# Patient Record
Sex: MALE | Race: WHITE | NOT HISPANIC OR LATINO | Employment: OTHER | URBAN - METROPOLITAN AREA
[De-identification: names, ages, dates, MRNs, and addresses within clinical notes are randomized per-mention and may not be internally consistent; named-entity substitution may affect disease eponyms.]

---

## 2017-05-15 ENCOUNTER — TRANSFERRED RECORDS (OUTPATIENT)
Dept: HEALTH INFORMATION MANAGEMENT | Facility: CLINIC | Age: 77
End: 2017-05-15

## 2017-06-07 ENCOUNTER — OFFICE VISIT (OUTPATIENT)
Dept: FAMILY MEDICINE | Facility: CLINIC | Age: 77
End: 2017-06-07
Payer: MEDICARE

## 2017-06-07 VITALS
WEIGHT: 235 LBS | SYSTOLIC BLOOD PRESSURE: 122 MMHG | DIASTOLIC BLOOD PRESSURE: 67 MMHG | TEMPERATURE: 97.5 F | OXYGEN SATURATION: 96 % | BODY MASS INDEX: 28.61 KG/M2 | HEART RATE: 64 BPM

## 2017-06-07 DIAGNOSIS — I35.0 AORTIC VALVE STENOSIS, UNSPECIFIED ETIOLOGY: Primary | ICD-10-CM

## 2017-06-07 DIAGNOSIS — E85.4 CARDIAC AMYLOIDOSIS (H): ICD-10-CM

## 2017-06-07 DIAGNOSIS — I43 CARDIAC AMYLOIDOSIS (H): ICD-10-CM

## 2017-06-07 PROCEDURE — 99213 OFFICE O/P EST LOW 20 MIN: CPT | Performed by: FAMILY MEDICINE

## 2017-06-07 RX ORDER — LORATADINE 10 MG/1
10 TABLET ORAL DAILY
COMMUNITY
End: 2018-07-13

## 2017-06-07 ASSESSMENT — PAIN SCALES - GENERAL: PAINLEVEL: EXTREME PAIN (8)

## 2017-06-07 NOTE — NURSING NOTE
"Chief Complaint   Patient presents with     Shortness of Breath       Initial /67 (BP Location: Right arm, Patient Position: Chair, Cuff Size: Adult Large)  Pulse 64  Temp 97.5  F (36.4  C) (Tympanic)  Wt 235 lb (106.6 kg)  SpO2 96%  BMI 28.61 kg/m2 Estimated body mass index is 28.61 kg/(m^2) as calculated from the following:    Height as of 6/3/16: 6' 4\" (1.93 m).    Weight as of this encounter: 235 lb (106.6 kg).  Medication Reconciliation: complete   Meenu Christianson CMA       "

## 2017-06-07 NOTE — MR AVS SNAPSHOT
After Visit Summary   6/7/2017    Jameel Barrett    MRN: [id number]           Patient Information     Date Of Birth          1940        Visit Information        Provider Department      6/7/2017 3:20 PM Slick Mena MD [facility]        Today's Diagnoses     Aortic valve stenosis, unspecified etiology    -  1    Cardiac amyloidosis (H)           Follow-ups after your visit        Additional Services     CARDIOLOGY EVAL ADULT REFERRAL       Your provider has referred you to:  [facility]: [facility] [phone]   [email or web address]    R/O cardiac amyloidosis, aortic stenosis    Please be aware that coverage of these services is subject to the terms and limitations of your health insurance plan.  Call member services at your health plan with any benefit or coverage questions.      Type of Referral:  New Cardiology Consult    Timeframe requested:  Less than 1 week    Please bring the following to your appointment:  >>   Any x-rays, CTs or MRIs which have been performed.  Contact the facility where they were done to arrange for  prior to your scheduled appointment.    >>   List of current medications  >>   This referral request   >>   Any documents/labs given to you for this referral                  Who to contact     If you have questions or need follow up information about today's clinic visit or your schedule please contact [facility] directly at [phone].  Normal or non-critical lab and imaging results will be communicated to you by MyChart, letter or phone within 4 business days after the clinic has received the results. If you do not hear from us within 7 days, please contact the clinic through MyChart or phone. If you have a critical or abnormal lab result, we will notify you by phone as soon as possible.  Submit refill requests through MyChart or call your  pharmacy and they will forward the refill request to us. Please allow 3 business days for your refill to be completed.          Additional Information About Your Visit        [employer] Information     [employer] gives you secure access to your electronic health record. If you see a primary care provider, you can also send messages to your care team and make appointments. If you have questions, please call your primary care clinic.  If you do not have a primary care provider, please call [phone] and they will assist you.        Care EveryWhere ID     This is your Care EveryWhere ID. This could be used by other organizations to access your [place] medical records  [id number]        Your Vitals Were     Pulse Temperature Pulse Oximetry BMI (Body Mass Index)          64 97.5  F (36.4  C) (Tympanic) 96% 28.61 kg/m2         Blood Pressure from Last 3 Encounters:   06/07/17 122/67   08/25/16 114/61   08/16/16 113/65    Weight from Last 3 Encounters:   06/07/17 235 lb (106.6 kg)   06/03/16 225 lb (102.1 kg)   06/03/14 223 lb (101.2 kg)              We Performed the Following     CARDIOLOGY EVAL ADULT REFERRAL        Primary Care Provider Office Phone # Fax #    Slick Mena [phone]       [facility] [address] [facility] [address]        Thank you!     Thank you for choosing [facility]  for your care. Our goal is always to provide you with excellent care. Hearing back from our patients is one way we can continue to improve our services. Please take a few minutes to complete the written survey that you may receive in the mail after your visit with us. Thank you!             Your Updated Medication List - Protect others around you: Learn how to safely use, store and throw away your medicines at www.disposemymeds.org.          This list is accurate as of: 6/7/17  3:56 PM.  Always use your most recent med list.                   Brand Name Dispense  Instructions for use    arginine 500 MG capsule      Take 400 mg by mouth daily       aspirin 81 MG tablet      Take 1 tablet by mouth daily.       atenolol 25 MG tablet    TENORMIN     Take 50 mg by mouth daily       atorvastatin 20 MG tablet    LIPITOR     Take 40 mg by mouth daily Pt cuts pill in half and takes 20 mg       CARTIA  MG 24 hr capsule   Generic drug:  diltiazem      Take 180 mg by mouth daily       loratadine 10 MG tablet    CLARITIN     Take 10 mg by mouth daily       MULTI-VITAMIN PO      Take  by mouth.

## 2017-06-07 NOTE — PROGRESS NOTES
SUBJECTIVE:                                                    Jameel Barrett is a 77 year old male who presents to clinic today for the following health issues:    He is being followed done at the [facility] for biopsy-proven ATTR cardiac amyloidosis, aortic stenosis, coronary artery disease with prior PCI. The patient has continued to notice progression of his exertional dyspnea.  He has a calculated aortic valve area of 1.8 and a mean gradient of 31. He has intermittent predictable angina.    The patient is interested in getting a second opinion. The [facility] offered him no definitive treatment for his symptoms. We will have him see cardiology at [facility].      Pt breathed in chemical fumes 2 days ago, throat became sore and felt weeping fluid going down throat. And is having sob, also a lot of weakness   His symptoms are gradually clearing and he is now back to baseline.        Problem list and histories reviewed & adjusted, as indicated.  Additional history: as documented        Reviewed and updated as needed this visit by clinical staff  Tobacco  Allergies  Med Hx  Surg Hx  Fam Hx  Soc Hx      Reviewed and updated as needed this visit by Provider        Medical, surgical, family, social histories, allergies and meds reviewed and updated.    ROS:  General: No change in weight, sleep or appetite.  Normal energy.  No fever or chills  Resp: dyspnea on exertion  CV: exertional chest pain or pressure  GI: No nausea, vomiting,  heartburn, abdominal pain, diarrhea, constipation or change in bowel habits    Exam:  GENERAL APPEARANCE ADULT: Alert, no acute distress  HENT: Ears and TMs normal, oral mucosa and posterior oropharynx normal  NECK: No adenopathy,masses or thyromegaly  RESP: lungs clear to auscultation   CV: regular rhythm, grade 3/6  systolic murmur.    ASSESSMENT:  (I35.0) Aortic valve stenosis, unspecified etiology  (primary encounter diagnosis)  Comment:   Plan: CARDIOLOGY EVAL ADULT  REFERRAL            (E85.4,  I43) Cardiac amyloidosis (H)  Comment:   Plan: CARDIOLOGY EVAL ADULT REFERRAL              PLAN:  Orders Placed This Encounter     CARDIOLOGY EVAL ADULT REFERRAL     loratadine (CLARITIN) 10 MG tablet   Recheck in clinic as needed.  We are sending for all the records from the [facility].    There are no Patient Instructions on file for this visit.      Slick Mena

## 2017-06-15 ENCOUNTER — OFFICE VISIT (OUTPATIENT)
Dept: CARDIOLOGY | Facility: CLINIC | Age: 77
End: 2017-06-15
Attending: FAMILY MEDICINE
Payer: MEDICARE

## 2017-06-15 VITALS
HEART RATE: 63 BPM | DIASTOLIC BLOOD PRESSURE: 72 MMHG | WEIGHT: 228.6 LBS | OXYGEN SATURATION: 96 % | SYSTOLIC BLOOD PRESSURE: 137 MMHG | BODY MASS INDEX: 27.83 KG/M2

## 2017-06-15 DIAGNOSIS — I35.0 NONRHEUMATIC AORTIC VALVE STENOSIS: Primary | ICD-10-CM

## 2017-06-15 PROCEDURE — 99204 OFFICE O/P NEW MOD 45 MIN: CPT | Performed by: INTERNAL MEDICINE

## 2017-06-15 RX ORDER — TRIAMTERENE/HYDROCHLOROTHIAZID 37.5-25 MG
1 TABLET ORAL
COMMUNITY
Start: 2014-11-05 | End: 2018-05-23

## 2017-06-15 NOTE — LETTER
6/15/2017    Slick Mena MD  [facility]  [address] [place] [address]    RE: Jameel Barrett       Dear Colleague,    I had the pleasure of seeing Jameel Barrett in the [facility].    CARDIOLOGY CONSULT    REASON FOR CONSULT: CAD, AS, cardiac amyloid    PRIMARY CARE PHYSICIAN:  Slick Mena    HISTORY OF PRESENT ILLNESS:  77-year-old male seen for evaluation of coronary artery disease and aortic stenosis.       He underwent stenting of the proximal LAD in 2007 and second LAD stent placed in 2008.   Coronary angiogram December 2010 showed mild disease of left main, proximal to mid LAD stents widely patent, D1 30%, circumflex 20-30%, dominant RCA with 75-80% lesion, no intervention performed.     Exercise nuclear stress test November 2014 showed fair exercise capacity, jaw pain during test, 1.5 mm of downsloping ST segments, primarily fixed defect of moderate intensity of the inferior and inferior lateral wall with minimal reversibility, ejection fraction 44% with inferior hypokinesis.  Angiogram in 2014 which showed no changes from 2010.  Chest pain was thought to may be from small vessel disease.     Echo May 2016 from outside [facility] showed ejection fraction 60%, moderate LVH, normal right ventricle, moderate left atrial enlargement, tricuspid aortic valve with mild insufficiency and moderate aortic stenosis (mean 25mmHg, Vmax 3.1m/s, DI 0.36, AMBER 1.0cm2).     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  This was proven by cardiac biopsy at the [facility].  He also had repeat angiogram in July 2016 showing mild to moderate disease with no severe lesions.  Hemodynamic assessment of the aortic valve showed moderate stenosis with mean gradient 31, valve area 1.87 m .  RH cath showed RA 3mmHg, RV 37/3, PA 31/11, LVEDP 16, CI 3.4. Echo July 2016 at [place] showed EF 61%, no WMA, severe LVH  (1.7cm), normal RV, mild LAE, moderate AS (mean 29mmHg, Vmax 3.3m/s, AMBER 1.4cm2, DI 0.29), mild AI.     At the [facility] in 2016 he was primarily treated for his ongoing angina.  He had been on a higher dose of diltiazem (180mg) which he did not tolerate because of fatigue.  He had been on atenolol and L-arginine.     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  Since last fall and over the winter, he complained of fatigue with exertion.  He also has a chronic exertional neck discomfort that dates back to his original stents.  He had been taking his diltiazem and L-arginine.  Blood pressure tends to run in the 120s with pulse in the 60s.  He denies any palpitations, overt chest pain, lightheadedness, or syncope.  His weight is stable with no lower extremity edema.    About one week ago he stopped his diltiazem and L-arginine.  Since then he has felt better with better energy and is throat tightness has gone away.  He is able to walking 1 mile outside without needing to stop and rest.    PAST MEDICAL HISTORY:  Past Medical History:   Diagnosis Date     Coronary artery disease      Hypertension      Skin cancer        MEDICATIONS:  Current Outpatient Prescriptions   Medication     arginine (L-ARGININE) 500 MG capsule     atenolol (TENORMIN) 25 MG tablet     aspirin 81 MG tablet     loratadine (CLARITIN) 10 MG tablet     atorvastatin (LIPITOR) 20 MG tablet     Multiple Vitamin (MULTI-VITAMIN PO)     No current facility-administered medications for this visit.        ALLERGIES:  No Known Allergies    SOCIAL HISTORY:  I have reviewed this patient's social history and updated it with pertinent information if needed. Jameel Nena  reports that he has never smoked. He has never used smokeless tobacco. He reports that he drinks alcohol.    FAMILY HISTORY:  I have reviewed this patient's family history and updated it with pertinent information if needed.   No family history on file.    REVIEW OF SYSTEMS:  Constitutional:  No weight loss, fever,  chills, weakness or fatigue.  HEENT:  Eyes:  No visual loss, blurred vision, double vision or yellow sclerae. No hearing loss, sneezing, congestion, runny nose or sore throat.  Skin:  No rash or itching.  Cardiovascular: per HPI  Respiratory: per HPI  GI:  No anorexia, nausea, vomiting or diarrhea. No abdominal pain or blood.  :  No dysurea, hematuria  Neurologic:  No headache, dizziness, syncope, paralysis, ataxia, numbness or tingling in the extremities. No change in bowel or bladder control.  Musculoskeletal:  No muscle, back pain, joint pain or stiffness.  Hematologic:  No anemia, bleeding or bruising.  Lymphatics:  No enlarged nodes. No history of splenectomy.  Psychiatric:  No history of depression or anxiety.  Endocrine:  No reports of sweating, cold or heat intolerance. No polyuria or polydipsia.  Allergies:  No history of asthma, hives, eczema or rhinitis.    PHYSICAL EXAM:  /72 (BP Location: Right arm, Patient Position: Chair, Cuff Size: Adult Regular)  Pulse 63  Wt 103.7 kg (228 lb 9.6 oz)  SpO2 96%  BMI 27.83 kg/m2  Constitutional: awake, alert, no distress  Eyes: PERRL, sclera nonicteric  ENT: trachea midline  Respiratory: Lungs clear  Cardiovascular: Regular rate and rhythm, 3/6 mid peaking systolic murmur at the upper sternal border, no diastolic murmur, a few ectopic beats  GI: nondistended, nontender, bowel sounds present  Lymph/Hematologic: no lymphadenopathy  Skin: dry, no rash  Musculoskeletal: good muscle tone, strength 5/5 in upper and lower extremities  Neurologic: no focal deficits  Neuropsychiatric: appropriate affact    ASSESSMENT:  77-year-old male seen for follow-up of coronary disease, aortic stenosis, and cardiac amyloidosis.    As for his coronary disease, he had no obstructive lesions on angiogram at the [facility] in July 2016.  Interestingly, his fatigue and throat tightness resolved in the past week after stopping diltiazem and L-arginine.  He was advised to stay off these  for now.  No indication for stress testing at this point.    He has moderate aortic stenosis and is due for another echo.  He is probably not point where he would have severe aortic stenosis that would cause symptoms.  It was explained that he'll almost certainly need aortic valve replacement at some point.    As for his cardiac amyloidosis, he has the wild type transthyretin variant.  There is no definitive treatment for this.  There reportedly are some drugs use and trials, but none locally including the [facility].  Generally these patients are followed and any symptoms are treated such as angina or heart failure.    RECOMMENDATIONS:  1.  Coronary artery disease  - For now stay off diltiazem and L-arginine, continue other medications  - Could consider calcium channel blocker or beta blocker in the future if symptoms recur    2.  Moderate aortic stenosis  - Repeat echocardiogram, will determine timing of next echo based on aortic stenosis findings, likely in 6 or 12 months    3.  Wild type transthyretin cardiac amyloidosis  - Echo as above  - No symptoms to suggest congestive heart failure or arrhythmia at this point  - Patient may do one follow-up with the [facility] later this summer    Follow-up in 1 year, sooner if needed if he has any symptoms.  Of note he is going to [place] for the winter in late September.    Justice Graff MD  Cardiology - [facility]  Pager:  [phone]  Text Page  Leta 15, 2017    Thank you for allowing me to participate in the care of your patient.    Sincerely,     Justice Graff MD     [facility]

## 2017-06-15 NOTE — MR AVS SNAPSHOT
After Visit Summary   6/15/2017    Jameel Barrett    MRN: [id number]           Patient Information     Date Of Birth          1940        Visit Information        Provider Department      6/15/2017 10:30 AM Justice Graff MD [facility] PHYS[facility] AT [facility]        Today's Diagnoses     Nonrheumatic aortic valve stenosis    -  1       Follow-ups after your visit        Your next 10 appointments already scheduled     Jun 27, 2017  8:45 AM CDT   Ech Complete with HANNAH   [facility] ([facility])    [address] [facility] [address]   [phone]           1.  Please bring or wear a comfortable two-piece outfit. 2.  You may eat, drink and take your normal medicines. 3.  For any questions that cannot be answered, please contact the ordering physician              Future tests that were ordered for you today     Open Future Orders        Priority Expected Expires Ordered    Echocardiogram Routine 6/22/2017 6/15/2018 6/15/2017            Who to contact     If you have questions or need follow up information about today's clinic visit or your schedule please contact [facility] PHYS[facility] AT [facility] directly at [phone].  Normal or non-critical lab and imaging results will be communicated to you by MyChart, letter or phone within 4 business days after the clinic has received the results. If you do not hear from us within 7 days, please contact the clinic through [employer] or phone. If you have a critical or abnormal lab result, we will notify you by phone as soon as possible.  Submit refill requests through [employer] or call your pharmacy and they will forward the refill request to us. Please allow 3 business days for your refill to be completed.          Additional Information About Your Visit        MyChart Information     [employer] gives you secure access to your electronic health record. If you  see a primary care provider, you can also send messages to your care team and make appointments. If you have questions, please call your primary care clinic.  If you do not have a primary care provider, please call [phone] and they will assist you.        Care EveryWhere ID     This is your Care EveryWhere ID. This could be used by other organizations to access your [place] medical records  [id number]        Your Vitals Were     Pulse Pulse Oximetry BMI (Body Mass Index)             63 96% 27.83 kg/m2          Blood Pressure from Last 3 Encounters:   06/15/17 137/72   06/07/17 122/67   08/25/16 114/61    Weight from Last 3 Encounters:   06/15/17 103.7 kg (228 lb 9.6 oz)   06/07/17 106.6 kg (235 lb)   06/03/16 102.1 kg (225 lb)               Primary Care Provider Office Phone # Fax #    Slick Mena [phone]       [facility] [address] [facility] [address]        Thank you!     Thank you for choosing [facility] PHYSICIAN HEART AT [facility]  for your care. Our goal is always to provide you with excellent care. Hearing back from our patients is one way we can continue to improve our services. Please take a few minutes to complete the written survey that you may receive in the mail after your visit with us. Thank you!             Your Updated Medication List - Protect others around you: Learn how to safely use, store and throw away your medicines at www.disposemymeds.org.          This list is accurate as of: 6/15/17 11:47 AM.  Always use your most recent med list.                   Brand Name Dispense Instructions for use    aspirin 81 MG tablet      Take 1 tablet by mouth daily.       atorvastatin 20 MG tablet    LIPITOR     Take 10 mg by mouth daily       loratadine 10 MG tablet    CLARITIN     Take 10 mg by mouth daily       MULTI-VITAMIN PO      Take  by mouth.       triamterene-hydrochlorothiazide 37.5-25 MG per tablet    MAXZIDE-25      Take 1 tablet by mouth

## 2017-06-15 NOTE — PROGRESS NOTES
CARDIOLOGY CONSULT    REASON FOR CONSULT: CAD, AS, cardiac amyloid    PRIMARY CARE PHYSICIAN:  Slick Mena    HISTORY OF PRESENT ILLNESS:  77-year-old male seen for evaluation of coronary artery disease and aortic stenosis.       He underwent stenting of the proximal LAD in 2007 and second LAD stent placed in 2008.   Coronary angiogram December 2010 showed mild disease of left main, proximal to mid LAD stents widely patent, D1 30%, circumflex 20-30%, dominant RCA with 75-80% lesion, no intervention performed.     Exercise nuclear stress test November 2014 showed fair exercise capacity, jaw pain during test, 1.5 mm of downsloping ST segments, primarily fixed defect of moderate intensity of the inferior and inferior lateral wall with minimal reversibility, ejection fraction 44% with inferior hypokinesis.  Angiogram in 2014 which showed no changes from 2010.  Chest pain was thought to may be from small vessel disease.     Echo May 2016 from outside [facility] showed ejection fraction 60%, moderate LVH, normal right ventricle, moderate left atrial enlargement, tricuspid aortic valve with mild insufficiency and moderate aortic stenosis (mean 25mmHg, Vmax 3.1m/s, DI 0.36, AMBER 1.0cm2).     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  This was proven by cardiac biopsy at the [facility].  He also had repeat angiogram in July 2016 showing mild to moderate disease with no severe lesions.  Hemodynamic assessment of the aortic valve showed moderate stenosis with mean gradient 31, valve area 1.87 m .  RH cath showed RA 3mmHg, RV 37/3, PA 31/11, LVEDP 16, CI 3.4. Echo July 2016 at [place] showed EF 61%, no WMA, severe LVH (1.7cm), normal RV, mild LAE, moderate AS (mean 29mmHg, Vmax 3.3m/s, AMBER 1.4cm2, DI 0.29), mild AI.     At the [facility] in 2016 he was primarily treated for his ongoing angina.  He had been on a higher dose of diltiazem (180mg) which he did not  tolerate because of fatigue.  He had been on atenolol and L-arginine.     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  Since last fall and over the winter, he complained of fatigue with exertion.  He also has a chronic exertional neck discomfort that dates back to his original stents.  He had been taking his diltiazem and L-arginine.  Blood pressure tends to run in the 120s with pulse in the 60s.  He denies any palpitations, overt chest pain, lightheadedness, or syncope.  His weight is stable with no lower extremity edema.    About one week ago he stopped his diltiazem and L-arginine.  Since then he has felt better with better energy and is throat tightness has gone away.  He is able to walking 1 mile outside without needing to stop and rest.    PAST MEDICAL HISTORY:  Past Medical History:   Diagnosis Date     Coronary artery disease      Hypertension      Skin cancer        MEDICATIONS:  Current Outpatient Prescriptions   Medication     arginine (L-ARGININE) 500 MG capsule     atenolol (TENORMIN) 25 MG tablet     aspirin 81 MG tablet     loratadine (CLARITIN) 10 MG tablet     atorvastatin (LIPITOR) 20 MG tablet     Multiple Vitamin (MULTI-VITAMIN PO)     No current facility-administered medications for this visit.        ALLERGIES:  No Known Allergies    SOCIAL HISTORY:  I have reviewed this patient's social history and updated it with pertinent information if needed. Jameel Barrett  reports that he has never smoked. He has never used smokeless tobacco. He reports that he drinks alcohol.    FAMILY HISTORY:  I have reviewed this patient's family history and updated it with pertinent information if needed.   No family history on file.    REVIEW OF SYSTEMS:  Constitutional:  No weight loss, fever, chills, weakness or fatigue.  HEENT:  Eyes:  No visual loss, blurred vision, double vision or yellow sclerae. No hearing loss, sneezing, congestion, runny nose or sore throat.  Skin:  No rash or itching.  Cardiovascular: per HPI  Respiratory: per  HPI  GI:  No anorexia, nausea, vomiting or diarrhea. No abdominal pain or blood.  :  No dysurea, hematuria  Neurologic:  No headache, dizziness, syncope, paralysis, ataxia, numbness or tingling in the extremities. No change in bowel or bladder control.  Musculoskeletal:  No muscle, back pain, joint pain or stiffness.  Hematologic:  No anemia, bleeding or bruising.  Lymphatics:  No enlarged nodes. No history of splenectomy.  Psychiatric:  No history of depression or anxiety.  Endocrine:  No reports of sweating, cold or heat intolerance. No polyuria or polydipsia.  Allergies:  No history of asthma, hives, eczema or rhinitis.    PHYSICAL EXAM:  /72 (BP Location: Right arm, Patient Position: Chair, Cuff Size: Adult Regular)  Pulse 63  Wt 103.7 kg (228 lb 9.6 oz)  SpO2 96%  BMI 27.83 kg/m2  Constitutional: awake, alert, no distress  Eyes: PERRL, sclera nonicteric  ENT: trachea midline  Respiratory: Lungs clear  Cardiovascular: Regular rate and rhythm, 3/6 mid peaking systolic murmur at the upper sternal border, no diastolic murmur, a few ectopic beats  GI: nondistended, nontender, bowel sounds present  Lymph/Hematologic: no lymphadenopathy  Skin: dry, no rash  Musculoskeletal: good muscle tone, strength 5/5 in upper and lower extremities  Neurologic: no focal deficits  Neuropsychiatric: appropriate affact    ASSESSMENT:  77-year-old male seen for follow-up of coronary disease, aortic stenosis, and cardiac amyloidosis.    As for his coronary disease, he had no obstructive lesions on angiogram at the [place] in July 2016.  Interestingly, his fatigue and throat tightness resolved in the past week after stopping diltiazem and L-arginine.  He was advised to stay off these for now.  No indication for stress testing at this point.    He has moderate aortic stenosis and is due for another echo.  He is probably not point where he would have severe aortic stenosis that would cause symptoms.  It was explained that he'll  almost certainly need aortic valve replacement at some point.    As for his cardiac amyloidosis, he has the wild type transthyretin variant.  There is no definitive treatment for this.  There reportedly are some drugs use and trials, but none locally including the [facility] [place] [facility].  Generally these patients are followed and any symptoms are treated such as angina or heart failure.    RECOMMENDATIONS:  1.  Coronary artery disease  - For now stay off diltiazem and L-arginine, continue other medications  - Could consider calcium channel blocker or beta blocker in the future if symptoms recur    2.  Moderate aortic stenosis  - Repeat echocardiogram, will determine timing of next echo based on aortic stenosis findings, likely in 6 or 12 months    3.  Wild type transthyretin cardiac amyloidosis  - Echo as above  - No symptoms to suggest congestive heart failure or arrhythmia at this point  - Patient may do one follow-up with the [facility] later this summer    Follow-up in 1 year, sooner if needed if he has any symptoms.  Of note he is going to [place] for the winter in late September.    Justice Graff MD  Cardiology - [facility]  Pager:  [phone]  Text Page  Leta 15, 2017

## 2017-06-27 ENCOUNTER — HOSPITAL ENCOUNTER (OUTPATIENT)
Dept: CARDIOLOGY | Facility: CLINIC | Age: 77
Discharge: HOME | End: 2017-06-27
Attending: INTERNAL MEDICINE | Admitting: INTERNAL MEDICINE
Payer: MEDICARE

## 2017-06-27 DIAGNOSIS — I35.0 AORTIC STENOSIS: Primary | ICD-10-CM

## 2017-06-27 DIAGNOSIS — I35.0 NONRHEUMATIC AORTIC VALVE STENOSIS: ICD-10-CM

## 2017-06-27 DIAGNOSIS — E85.9 AMYLOIDOSIS (H): ICD-10-CM

## 2017-06-27 PROCEDURE — 93306 TTE W/DOPPLER COMPLETE: CPT | Mod: 26 | Performed by: INTERNAL MEDICINE

## 2017-06-27 PROCEDURE — 93306 TTE W/DOPPLER COMPLETE: CPT

## 2017-06-27 NOTE — PROGRESS NOTES
Echo shows normal heart function, the aortic stenosis has progressed slightly, now in the moderate to severe range.  No changes to medications.  No further testing needed at this point.  He should have an echo in about 6 months.  [place] till spring, unless his symptoms worsen.    Justice Graff MD  Cardiology - [facility]  Pager: [phone]  Text Page  June 27, 2017

## 2017-07-14 ENCOUNTER — TRANSFERRED RECORDS (OUTPATIENT)
Dept: HEALTH INFORMATION MANAGEMENT | Facility: CLINIC | Age: 77
End: 2017-07-14

## 2017-08-13 ENCOUNTER — HOSPITAL ENCOUNTER (EMERGENCY)
Facility: CLINIC | Age: 77
Discharge: HOME | End: 2017-08-13
Attending: EMERGENCY MEDICINE | Admitting: EMERGENCY MEDICINE
Payer: MEDICARE

## 2017-08-13 VITALS
SYSTOLIC BLOOD PRESSURE: 137 MMHG | DIASTOLIC BLOOD PRESSURE: 81 MMHG | RESPIRATION RATE: 13 BRPM | OXYGEN SATURATION: 96 % | WEIGHT: 230 LBS | BODY MASS INDEX: 28.01 KG/M2 | HEIGHT: 76 IN

## 2017-08-13 DIAGNOSIS — F41.1 ANXIETY REACTION: ICD-10-CM

## 2017-08-13 LAB
ALBUMIN SERPL-MCNC: 3.4 G/DL (ref 3.4–5)
ALP SERPL-CCNC: 87 U/L (ref 40–150)
ALT SERPL W P-5'-P-CCNC: 30 U/L (ref 0–70)
ANION GAP SERPL CALCULATED.3IONS-SCNC: 6 MMOL/L (ref 3–14)
AST SERPL W P-5'-P-CCNC: 28 U/L (ref 0–45)
BASOPHILS # BLD AUTO: 0 10E9/L (ref 0–0.2)
BASOPHILS NFR BLD AUTO: 0.3 %
BILIRUB SERPL-MCNC: 0.4 MG/DL (ref 0.2–1.3)
BUN SERPL-MCNC: 17 MG/DL (ref 7–30)
CALCIUM SERPL-MCNC: 9.2 MG/DL (ref 8.5–10.1)
CHLORIDE SERPL-SCNC: 107 MMOL/L (ref 94–109)
CO2 SERPL-SCNC: 27 MMOL/L (ref 20–32)
CREAT SERPL-MCNC: 1.09 MG/DL (ref 0.66–1.25)
DIFFERENTIAL METHOD BLD: ABNORMAL
EOSINOPHIL # BLD AUTO: 0.3 10E9/L (ref 0–0.7)
EOSINOPHIL NFR BLD AUTO: 3.9 %
ERYTHROCYTE [DISTWIDTH] IN BLOOD BY AUTOMATED COUNT: 13.2 % (ref 10–15)
GFR SERPL CREATININE-BSD FRML MDRD: 65 ML/MIN/1.7M2
GLUCOSE SERPL-MCNC: 92 MG/DL (ref 70–99)
HCT VFR BLD AUTO: 40.4 % (ref 40–53)
HGB BLD-MCNC: 13.3 G/DL (ref 13.3–17.7)
IMM GRANULOCYTES # BLD: 0 10E9/L (ref 0–0.4)
IMM GRANULOCYTES NFR BLD: 0.4 %
LYMPHOCYTES # BLD AUTO: 1.7 10E9/L (ref 0.8–5.3)
LYMPHOCYTES NFR BLD AUTO: 24.3 %
MCH RBC QN AUTO: 30.3 PG (ref 26.5–33)
MCHC RBC AUTO-ENTMCNC: 32.9 G/DL (ref 31.5–36.5)
MCV RBC AUTO: 92 FL (ref 78–100)
MONOCYTES # BLD AUTO: 0.8 10E9/L (ref 0–1.3)
MONOCYTES NFR BLD AUTO: 11.2 %
NEUTROPHILS # BLD AUTO: 4.3 10E9/L (ref 1.6–8.3)
NEUTROPHILS NFR BLD AUTO: 59.9 %
PLATELET # BLD AUTO: 212 10E9/L (ref 150–450)
POTASSIUM SERPL-SCNC: 3.6 MMOL/L (ref 3.4–5.3)
PROT SERPL-MCNC: 6.7 G/DL (ref 6.8–8.8)
RBC # BLD AUTO: 4.39 10E12/L (ref 4.4–5.9)
SODIUM SERPL-SCNC: 140 MMOL/L (ref 133–144)
TROPONIN I SERPL-MCNC: 0.05 UG/L (ref 0–0.04)
TROPONIN I SERPL-MCNC: 0.05 UG/L (ref 0–0.04)
WBC # BLD AUTO: 7.1 10E9/L (ref 4–11)

## 2017-08-13 PROCEDURE — 93005 ELECTROCARDIOGRAM TRACING: CPT | Mod: 76

## 2017-08-13 PROCEDURE — 99285 EMERGENCY DEPT VISIT HI MDM: CPT | Mod: 25

## 2017-08-13 PROCEDURE — 84484 ASSAY OF TROPONIN QUANT: CPT | Performed by: EMERGENCY MEDICINE

## 2017-08-13 PROCEDURE — 93010 ELECTROCARDIOGRAM REPORT: CPT | Performed by: EMERGENCY MEDICINE

## 2017-08-13 PROCEDURE — 99285 EMERGENCY DEPT VISIT HI MDM: CPT | Mod: 25 | Performed by: EMERGENCY MEDICINE

## 2017-08-13 PROCEDURE — 80053 COMPREHEN METABOLIC PANEL: CPT | Performed by: EMERGENCY MEDICINE

## 2017-08-13 PROCEDURE — 25000128 H RX IP 250 OP 636: Performed by: EMERGENCY MEDICINE

## 2017-08-13 PROCEDURE — 85025 COMPLETE CBC W/AUTO DIFF WBC: CPT | Performed by: EMERGENCY MEDICINE

## 2017-08-13 PROCEDURE — 93005 ELECTROCARDIOGRAM TRACING: CPT

## 2017-08-13 PROCEDURE — 96374 THER/PROPH/DIAG INJ IV PUSH: CPT

## 2017-08-13 RX ORDER — LORAZEPAM 0.5 MG/1
0.5 TABLET ORAL EVERY 8 HOURS PRN
Qty: 10 TABLET | Refills: 0 | Status: SHIPPED | OUTPATIENT
Start: 2017-08-13 | End: 2017-08-22

## 2017-08-13 RX ORDER — LIDOCAINE 40 MG/G
CREAM TOPICAL
Status: DISCONTINUED | OUTPATIENT
Start: 2017-08-13 | End: 2017-08-13 | Stop reason: HOSPADM

## 2017-08-13 RX ORDER — LORAZEPAM 2 MG/ML
0.5 INJECTION INTRAMUSCULAR ONCE
Status: COMPLETED | OUTPATIENT
Start: 2017-08-13 | End: 2017-08-13

## 2017-08-13 RX ADMIN — LORAZEPAM 0.5 MG: 2 INJECTION INTRAMUSCULAR; INTRAVENOUS at 01:01

## 2017-08-13 ASSESSMENT — ENCOUNTER SYMPTOMS
HEADACHES: 0
WEAKNESS: 0
FATIGUE: 0
ABDOMINAL PAIN: 0
NUMBNESS: 0
VOMITING: 0
CHEST TIGHTNESS: 0
FEVER: 0
DIAPHORESIS: 0
SHORTNESS OF BREATH: 0
PALPITATIONS: 0
BACK PAIN: 0
LIGHT-HEADEDNESS: 1
NAUSEA: 0
CONFUSION: 0
COUGH: 0
APPETITE CHANGE: 0

## 2017-08-13 NOTE — ED NOTES
Pt smiling and reports relief of anxiety and  pressure/pain sensations  Is resting comfortably conversing with his wife

## 2017-08-13 NOTE — ED AVS SNAPSHOT
[facility] Emergency Department    [address] [address]    Phone:  [phone]    Fax:  [phone]                                       Jameel Barrett   MRN: [id number]    Department:  [facility] Emergency Department   Date of Visit:  8/13/2017           After Visit Summary Signature Page     I have received my discharge instructions, and my questions have been answered. I have discussed any challenges I see with this plan with the nurse or doctor.    ..........................................................................................................................................  Patient/Patient Representative Signature      ..........................................................................................................................................  Patient Representative Print Name and Relationship to Patient    ..................................................               ................................................  Date                                            Time    ..........................................................................................................................................  Reviewed by Signature/Title    ...................................................              ..............................................  Date                                                            Time

## 2017-08-13 NOTE — DISCHARGE INSTRUCTIONS

## 2017-08-13 NOTE — ED PROVIDER NOTES
History     Chief Complaint   Patient presents with     Chest Pain     HPI  Jameel Barrett is a 77 year old male with history of hypertension, hyperlipidemia, coronary artery disease status post stents presents for evaluation of chest pain tonight.  Patient reports chronic intermittent chest discomfort, often with exertion.  No associated dyspnea, diaphoresis, or nausea.  Patient reports he was trying to sleep tonight when he became anxious and worried about his chronic heart condition and wondering whether he would die in his sleep.  Patient reports he did not have any significant new symptoms tonight although was having his usual mild left-sided chest discomfort which he gets from time to time.  In the emergency department he continues to have some mild left-sided chest discomfort without trouble breathing, nausea, or diaphoresis.  Patient admits to feeling anxious about his chronic conditions which he feels he has no control over.  Patient admits long-standing untreated anxiety regarding his medical conditions and reports having difficulty sleeping with frequent nighttime awakenings secondary to this.  Patient states he is able to fall back asleep eventually but is not as restful and often is tired during the day requiring multiple.  Denies any history of snoring or known sleep apnea.    I have reviewed the Medications, Allergies, Past Medical and Surgical History, and Social History in the Epic system.      Recent cardiology visit on 6/15/17  ASSESSMENT:  77-year-old male seen for follow-up of coronary disease, aortic stenosis, and cardiac amyloidosis.     As for his coronary disease, he had no obstructive lesions on angiogram at the [place] in July 2016.  Interestingly, his fatigue and throat tightness resolved in the past week after stopping diltiazem and L-arginine.  He was advised to stay off these for now.  No indication for stress testing at this point.     He has moderate aortic stenosis and is due for  another echo.  He is probably not point where he would have severe aortic stenosis that would cause symptoms.  It was explained that he'll almost certainly need aortic valve replacement at some point.     As for his cardiac amyloidosis, he has the wild type transthyretin variant.  There is no definitive treatment for this.  There reportedly are some drugs use and trials, but none locally including the [facility].  Generally these patients are followed and any symptoms are treated such as angina or heart failure.     RECOMMENDATIONS:  1.  Coronary artery disease  - For now stay off diltiazem and L-arginine, continue other medications  - Could consider calcium channel blocker or beta blocker in the future if symptoms recur     2.  Moderate aortic stenosis  - Repeat echocardiogram, will determine timing of next echo based on aortic stenosis findings, likely in 6 or 12 months     3.  Wild type transthyretin cardiac amyloidosis  - Echo as above  - No symptoms to suggest congestive heart failure or arrhythmia at this point  - Patient may do one follow-up with the [facility] later this summer     Follow-up in 1 year, sooner if needed if he has any symptoms.  Of note he is going to [place] for the winter in late September.     Justice Graff MD  Cardiology - [facility]  Pager:  [phone]  Text Page  Leta 15, 2017    Allergies: No Known Allergies      No current facility-administered medications on file prior to encounter.   Current Outpatient Prescriptions on File Prior to Encounter:  triamterene-hydrochlorothiazide (MAXZIDE-25) 37.5-25 MG per tablet Take 1 tablet by mouth   loratadine (CLARITIN) 10 MG tablet Take 10 mg by mouth daily   atorvastatin (LIPITOR) 20 MG tablet Take 10 mg by mouth daily    aspirin 81 MG tablet Take 1 tablet by mouth daily.   Multiple Vitamin (MULTI-VITAMIN PO) Take  by mouth.       Patient Active Problem List   Diagnosis     Hyperlipidemia LDL goal <100     Neck pain     Headache      "Ptosis-episodic     Diplopia     CAD (coronary artery disease)       Past Surgical History:   Procedure Laterality Date     CARDIAC SURGERY  4/8/08      CARDIAC SURGERY  4/12/07     ORTHOPEDIC SURGERY  10/08    LT hip replacment       Social History   Substance Use Topics     Smoking status: Never Smoker     Smokeless tobacco: Never Used     Alcohol use Yes      Comment: 1 drink daily         There is no immunization history on file for this patient.    BMI: Estimated body mass index is 28 kg/(m^2) as calculated from the following:    Height as of this encounter: 1.93 m (6' 4\").    Weight as of this encounter: 104.3 kg (230 lb).      Review of Systems   Constitutional: Negative for appetite change, diaphoresis, fatigue and fever.   HENT: Negative for congestion.    Respiratory: Negative for cough, chest tightness and shortness of breath.    Cardiovascular: Positive for chest pain (Left sided). Negative for palpitations and leg swelling.   Gastrointestinal: Negative for abdominal pain, nausea and vomiting.   Genitourinary: Negative for decreased urine volume.   Musculoskeletal: Negative for back pain.   Skin: Negative for rash.   Neurological: Positive for light-headedness (with rapid change in head position). Negative for weakness, numbness and headaches.   Psychiatric/Behavioral: Negative for confusion.   All other systems reviewed and are negative.      Physical Exam      Physical Exam   Constitutional: He is oriented to person, place, and time. He appears well-developed and well-nourished. No distress.   HENT:   Head: Normocephalic and atraumatic.   Eyes: Conjunctivae are normal.   Cardiovascular: Normal rate and regular rhythm.    Murmur heard.  Pulmonary/Chest: Effort normal and breath sounds normal.   Abdominal: Soft. There is no tenderness.   Musculoskeletal: Normal range of motion. He exhibits no edema.   Neurological: He is alert and oriented to person, place, and time.   Skin: Skin is warm and dry. He is " not diaphoretic.   Psychiatric: He has a normal mood and affect.   Nursing note and vitals reviewed.      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  Unchanged from previous.        Labs Ordered and Resulted from Time of ED Arrival Up to the Time of Departure from the ED   CBC WITH PLATELETS DIFFERENTIAL - Abnormal; Notable for the following:        Result Value    RBC Count 4.39 (*)     All other components within normal limits   COMPREHENSIVE METABOLIC PANEL - Abnormal; Notable for the following:     Protein Total 6.7 (*)     All other components within normal limits   TROPONIN I - Abnormal; Notable for the following:     Troponin I ES 0.050 (*)     All other components within normal limits   TROPONIN I - Abnormal; Notable for the following:     Troponin I ES 0.048 (*)     All other components within normal limits    PERIPHERAL IV CATHETER       1:45 AM: Patient reassessed.  All chest discomfort has resolved.  Patient states he feels much better after medication.  Denies any trouble breathing.  Discussed mildly elevated troponin.  Likely chronic but patient does get intermittent anginal chest pains so we will repeat a 2 hour level.  We will also repeat EKG.    2:56 AM: PT re-assessed. Repeat trop unchanged. Remains asymptomatic.     Assessments & Plan (with Medical Decision Making)  77-year-old male with history of hypertension, coronary disease, amyloidosis, and hyponatremia presents for evaluation of anxiety and chest discomfort.  Patient reports he's had significant anxiety over the past several weeks to months associated with his cardiac diagnoses.  Does report intermittent episodes of left-sided chest discomfort without dyspnea, diaphoresis, or nausea.  Tonight, patient was getting ready for bed and could not sleep due to anxiety prompting evaluation tonight.  Patient states he kept thinking that he might not wake up when he fell asleep and he does not want to die.  Patient reports he had no new or acute symptoms tonight but more of his ongoing chronic symptoms which were worrying him.  In the ED, patient does appear anxious.  We discussed his current symptoms and proceeded with cardiac screening but also symptomatic treatment of anxiety with lorazepam.  Patient's symptoms rapidly improved with lorazepam and did not return.  Screening EKG was not significantly changed from previous.  Troponin was borderline elevated at 0.05.  2nd level drawn 2 hours later was essentially unchanged at 0.048.  Patient has been followed closely with cardiology, last visit about 2 months ago when he had a repeat echo showing slightly worsening aortic stenosis but normal ejection fraction.  Cardiology did not recommend additional stress testing at that time.  Given his absence of symptoms and resolution of his anxiety with a stable elevated  troponin which is likely secondary to his amyloidosis, recommended close primary care follow-up in a trial when necessary lorazepam for anxiety.  Patient advised that if symptoms do return or worsen, he should return to the emergency department for repeat evaluation given his chronic underlying cardiac disease.       I have reviewed the nursing notes.    I have reviewed the findings, diagnosis, plan and need for follow up with the patient.       New Prescriptions    LORAZEPAM (ATIVAN) 0.5 MG TABLET    Take 1 tablet (0.5 mg) by mouth every 8 hours as needed for anxiety       Final diagnoses:   Anxiety reaction       8/13/2017   [facility] EMERGENCY DEPARTMENT     Velazquez, Juan Carlos Ruby MD  08/13/17 8118

## 2017-08-13 NOTE — ED AVS SNAPSHOT
[facility] Emergency Department    [address]    [facility] [address]    Phone:  [phone]    Fax:  [phone]                                       Jameel Barrett   MRN: [id number]    Department:  [facility] Emergency Department   Date of Visit:  8/13/2017           Patient Information     Date Of Birth          1940        Your diagnoses for this visit were:     Anxiety reaction        You were seen by Juan Carlos Velazquez MD.      Follow-up Information     Follow up with Slick Mena MD. Schedule an appointment as soon as possible for a visit in 2 days.    Specialties:  Family Practice, Occupational Medicine    Why:  For follow up    Contact information:    [address]  [facility] [address]  [phone]          Discharge Instructions         Anxiety Reaction  Anxiety is the feeling we all get when we think something bad might happen. It is a normal response to stress and usually causes only a mild reaction. When anxiety becomes more severe, it can interfere with daily life. In some cases, you may not even be aware of what it is you re anxious about. There may also be a genetic link or it may be a learned behavior in the home.  Both psychological and physical triggers cause stress reaction. It's often a response to fear or emotional stress, real or imagined. This stress may come from home, family, work, or social relationships.  During an anxiety reaction, you may feel:    Helpless    Nervous    Depressed    Irritable  Your body may show signs of anxiety in many ways. You may experience:    Dry mouth    Shakiness    Dizziness    Weakness    Trouble breathing    Breathing fast (hyperventilating)    Chest pressure    Sweating    Headache    Nausea    Diarrhea    Tiredness    Inability to sleep    Sexual problems  Home care    Try to locate the sources of stress in your life. They may not be obvious. These may include:    Daily hassles of life (traffic jams, missed  appointments, car troubles, etc.)    Major life changes, both good (new baby, job promotion) and bad (loss of job, loss of loved one)    Overload: feeling that you have too many responsibilities and can't take care of all of them at once    Feeling helpless, feeling that your problems are beyond what you re able to solve    Notice how your body reacts to stress. Learn to listen to your body signals. This will help you take action before the stress becomes severe.    When you can, do something about the source of your stress. (Avoid hassles, limit the amount of change that happens in your life at one time and take a break when you feel overloaded).    Unfortunately, many stressful situations can't be avoided. It is necessary to learn how to better manage stress. There are many proven methods that will reduce your anxiety. These include simple things like exercise, good nutrition and adequate rest. Also, there are certain techniques that are helpful:    Relaxation    Breathing exercises    Visualization    Biofeedback    Meditation  For more information about this, consult your doctor or go to a local bookstore and review the many books and tapes available on this subject.  Follow-up care  If you feel that your anxiety is not responding to self-help measures, contact your doctor or make an appointment with a counselor. You may need short-term psychological counseling and temporary medicine to help you manage stress.  Call 911  Call your healthcare provider right away if any of these occur:    Trouble breathing    Confusion    Drowsiness or trouble wakening    Fainting or loss of consciousness    Rapid heart rate    Seizure    New chest pain that becomes more severe, lasts longer, or spreads into your shoulder, arm, neck, jaw, or back  When to seek medical advice  Call your healthcare provider right away if any of these occur:    Your symptoms get worse    Severe headache not relieved by rest and mild pain  reliever  Date Last Reviewed: 9/29/2015 2000-2017 [employer] [address] [place] [address], [place] [address]. All rights reserved. This information is not intended as a substitute for professional medical care. Always follow your healthcare professional's instructions.          24 Hour Appointment Hotline       To make an appointment at any [facility], call [phone] ([phone]). If you don't have a family doctor or clinic, we will help you find one. [facility] [place] [facility] (formerly [facility]) are conveniently located to serve the needs of you and your family.             Review of your medicines      START taking        Dose / Directions Last dose taken    LORazepam 0.5 MG tablet   Commonly known as:  ATIVAN   Dose:  0.5 mg   Quantity:  10 tablet        Take 1 tablet (0.5 mg) by mouth every 8 hours as needed for anxiety   Refills:  0          Our records show that you are taking the medicines listed below. If these are incorrect, please call your family doctor or clinic.        Dose / Directions Last dose taken    aspirin 81 MG tablet   Dose:  1 tablet        Take 1 tablet by mouth daily.   Refills:  0        atorvastatin 20 MG tablet   Commonly known as:  LIPITOR   Dose:  10 mg        Take 10 mg by mouth daily   Refills:  0        loratadine 10 MG tablet   Commonly known as:  CLARITIN   Dose:  10 mg        Take 10 mg by mouth daily   Refills:  0        MULTI-VITAMIN PO        Take  by mouth.   Refills:  0        triamterene-hydrochlorothiazide 37.5-25 MG per tablet   Commonly known as:  MAXZIDE-25   Dose:  1 tablet        Take 1 tablet by mouth   Refills:  0                Prescriptions were sent or printed at these locations (1 Prescription)                   Other Prescriptions                Printed at Department/Unit printer (1 of 1)         LORazepam (ATIVAN) 0.5 MG tablet                Procedures and tests performed during your visit     Procedure/Test Number of Times Performed    CBC with platelets  differential 1    Comprehensive metabolic panel 1    EKG 12-lead, tracing only 2    Peripheral IV catheter 1    Troponin I 1    Troponin I (second draw) 1      Orders Needing Specimen Collection     None      Pending Results     No orders found from 8/11/2017 to 8/14/2017.            Pending Culture Results     No orders found from 8/11/2017 to 8/14/2017.            Pending Results Instructions     If you had any lab results that were not finalized at the time of your Discharge, you can call the ED Lab Result RN at [phone]. You will be contacted by this team for any positive Lab results or changes in treatment. The nurses are available 7 days a week from 10A to 6:30P.  You can leave a message 24 hours per day and they will return your call.        Test Results From Your Hospital Stay        8/13/2017 12:26 AM      Component Results     Component Value Ref Range & Units Status    WBC 7.1 4.0 - 11.0 10e9/L Final    RBC Count 4.39 (L) 4.4 - 5.9 10e12/L Final    Hemoglobin 13.3 13.3 - 17.7 g/dL Final    Hematocrit 40.4 40.0 - 53.0 % Final    MCV 92 78 - 100 fl Final    MCH 30.3 26.5 - 33.0 pg Final    MCHC 32.9 31.5 - 36.5 g/dL Final    RDW 13.2 10.0 - 15.0 % Final    Platelet Count 212 150 - 450 10e9/L Final    Diff Method Automated Method  Final    % Neutrophils 59.9 % Final    % Lymphocytes 24.3 % Final    % Monocytes 11.2 % Final    % Eosinophils 3.9 % Final    % Basophils 0.3 % Final    % Immature Granulocytes 0.4 % Final    Absolute Neutrophil 4.3 1.6 - 8.3 10e9/L Final    Absolute Lymphocytes 1.7 0.8 - 5.3 10e9/L Final    Absolute Monocytes 0.8 0.0 - 1.3 10e9/L Final    Absolute Eosinophils 0.3 0.0 - 0.7 10e9/L Final    Absolute Basophils 0.0 0.0 - 0.2 10e9/L Final    Abs Immature Granulocytes 0.0 0 - 0.4 10e9/L Final         8/13/2017 12:45 AM      Component Results     Component Value Ref Range & Units Status    Sodium 140 133 - 144 mmol/L Final    Potassium 3.6 3.4 - 5.3 mmol/L Final    Chloride 107 94 -  109 mmol/L Final    Carbon Dioxide 27 20 - 32 mmol/L Final    Anion Gap 6 3 - 14 mmol/L Final    Glucose 92 70 - 99 mg/dL Final    Urea Nitrogen 17 7 - 30 mg/dL Final    Creatinine 1.09 0.66 - 1.25 mg/dL Final    GFR Estimate 65 >60 mL/min/1.7m2 Final    Non  GFR Calc    GFR Estimate If Black 79 >60 mL/min/1.7m2 Final    African American GFR Calc    Calcium 9.2 8.5 - 10.1 mg/dL Final    Bilirubin Total 0.4 0.2 - 1.3 mg/dL Final    Albumin 3.4 3.4 - 5.0 g/dL Final    Protein Total 6.7 (L) 6.8 - 8.8 g/dL Final    Alkaline Phosphatase 87 40 - 150 U/L Final    ALT 30 0 - 70 U/L Final    AST 28 0 - 45 U/L Final         8/13/2017 12:45 AM      Component Results     Component Value Ref Range & Units Status    Troponin I ES 0.050 (H) 0.000 - 0.045 ug/L Final    The 99th percentile for upper reference range is 0.045 ug/L.  Troponin values   in   the range of 0.045 - 0.120 ug/L may be associated with risks of adverse   clinical events.           8/13/2017  2:48 AM      Component Results     Component Value Ref Range & Units Status    Troponin I ES 0.048 (H) 0.000 - 0.045 ug/L Final    The 99th percentile for upper reference range is 0.045 ug/L.  Troponin values   in   the range of 0.045 - 0.120 ug/L may be associated with risks of adverse   clinical events.                  Thank you for choosing [facility]       Thank you for choosing [facility] for your care. Our goal is always to provide you with excellent care. Hearing back from our patients is one way we can continue to improve our services. Please take a few minutes to complete the written survey that you may receive in the mail after you visit with us. Thank you!        [employer] Information     [employer] gives you secure access to your electronic health record. If you see a primary care provider, you can also send messages to your care team and make appointments. If you have questions, please call your primary care clinic.  If you do not have a primary care  provider, please call [phone] and they will assist you.        Care EveryWhere ID     This is your Care EveryWhere ID. This could be used by other organizations to access your [facility] medical records  [id number]        Equal Access to Services     KIERAN DERAS : Marcell Lezama, roselia smith, qanydia kaaladriel talley, sheldon mauro. So [facility] [phone].    ATENCIÓN: Si habla español, tiene a brown disposición servicios gratuitos de asistencia lingüística. Llame al [phone].    We comply with applicable federal civil rights laws and [place] laws. We do not discriminate on the basis of race, color, national origin, age, disability sex, sexual orientation or gender identity.            After Visit Summary       This is your record. Keep this with you and show to your community pharmacist(s) and doctor(s) at your next visit.

## 2017-08-13 NOTE — ED NOTES
"Ongoing concerns with cardiac condition has intermittent chest pain and palpitations today has been more prevalent and he has felt more tired  Also noted increased exertional SOB    Pt reports medications changes about 2 weeks ago     Has had follow up at [facility], with his PCP and has seen cardiology here for \"a second opinion\"       Pt came in tonight as he has been increasingly concerned about his symptoms feels anxious and does multiple BP checks at home and decided   \"i just need to know the heart is going to keep beating\"    Reports he took 1/3 of a viagra tablet 3 days ago    No NTG    Denies nausea does have ongoing occasional dizziness frequently with position changes      "

## 2017-08-22 ENCOUNTER — RADIANT APPOINTMENT (OUTPATIENT)
Dept: GENERAL RADIOLOGY | Facility: CLINIC | Age: 77
End: 2017-08-22
Attending: FAMILY MEDICINE
Payer: MEDICARE

## 2017-08-22 ENCOUNTER — OFFICE VISIT (OUTPATIENT)
Dept: FAMILY MEDICINE | Facility: CLINIC | Age: 77
End: 2017-08-22
Payer: MEDICARE

## 2017-08-22 VITALS
HEART RATE: 71 BPM | TEMPERATURE: 96.6 F | SYSTOLIC BLOOD PRESSURE: 122 MMHG | BODY MASS INDEX: 28.12 KG/M2 | DIASTOLIC BLOOD PRESSURE: 73 MMHG | OXYGEN SATURATION: 97 % | WEIGHT: 231 LBS

## 2017-08-22 DIAGNOSIS — F41.9 ANXIETY: ICD-10-CM

## 2017-08-22 DIAGNOSIS — M54.16 LUMBAR RADICULOPATHY: ICD-10-CM

## 2017-08-22 DIAGNOSIS — M25.551 HIP PAIN, RIGHT: Primary | ICD-10-CM

## 2017-08-22 DIAGNOSIS — M25.551 HIP PAIN, RIGHT: ICD-10-CM

## 2017-08-22 PROCEDURE — 73501 X-RAY EXAM HIP UNI 1 VIEW: CPT

## 2017-08-22 PROCEDURE — 99214 OFFICE O/P EST MOD 30 MIN: CPT | Performed by: FAMILY MEDICINE

## 2017-08-22 RX ORDER — CELECOXIB 100 MG/1
100 CAPSULE ORAL 2 TIMES DAILY PRN
Qty: 60 CAPSULE | Refills: 11 | Status: SHIPPED | OUTPATIENT
Start: 2017-08-22 | End: 2018-05-23

## 2017-08-22 RX ORDER — LORAZEPAM 0.5 MG/1
0.5 TABLET ORAL DAILY PRN
Qty: 30 TABLET | Refills: 0 | Status: SHIPPED | OUTPATIENT
Start: 2017-08-22 | End: 2018-05-23

## 2017-08-22 ASSESSMENT — PAIN SCALES - GENERAL: PAINLEVEL: NO PAIN (0)

## 2017-08-22 NOTE — PROGRESS NOTES
SUBJECTIVE:   Jameel Barrett is a 77 year old male who presents to clinic today for the following health issues:    Right hip pain: He is having significant pain in the right hip which radiates down to the right knee. He is also having numbness on the right thigh. X-ray of the right hip today shows minimal arthritis. He will be going to [place] for the winter. He will start physical therapy for probable right lumbar radiculopathy at the [facility] clinic. He may follow-up with orthopedics regarding the lumbar radiculopathy. He needs an MRI but does not want it scheduled at this time.  Will try Celebrex 100 mg b.i.d. P.r.n.    He was in the emergency room recently for chest pain and got pretty worked up about it. He was given IV lorazepam with good success. He wants to keep a certain amount of these lorazepam available. He only has used a few. I gave him #30.    He needs a refill on Maxide. He is understanding that this was too keep the potassium down because he was on atenolol. I think he is probably on Maxide for hypertension. We will keep him off Maxide since his blood pressures are normal.  Total face to face time: 25 minutes.  Time spent counseling on 15 minutes as outline above.        ED/UC Followup:    Facility:  [facility]  Date of visit: 8/13/17  Reason for visit: Anxiety reaction  Current Status: better     Follow up on referral for heart valve replacement. Pt has 1st opinion from [facility].        Problem list and histories reviewed & adjusted, as indicated.  Additional history: as documented        Reviewed and updated as needed this visit by clinical staff  Tobacco  Allergies  Med Hx  Surg Hx  Fam Hx  Soc Hx      Reviewed and updated as needed this visit by Provider        Medical, surgical, family, social histories, allergies and meds reviewed and updated.    ROS:  General: No change in weight, sleep or appetite.  Normal energy.  No fever or chills  Resp: No coughing, wheezing or  shortness of breath  CV: No chest pains or palpitations  GI: No nausea, vomiting,  heartburn, abdominal pain, diarrhea, constipation or change in bowel habits    Exam:  GENERAL APPEARANCE ADULT: Alert, no acute distress  MS: back exam: normal posture, moves about the exam room comfortably, full ROM    ASSESSMENT:  (M25.551) Hip pain, right  (primary encounter diagnosis)  Comment:   Plan: XR Hip Right 1 View, PHYSICAL THERAPY REFERRAL            (M54.16) Lumbar radiculopathy  Comment:   Plan: celecoxib (CELEBREX) 100 MG capsule, PHYSICAL         THERAPY REFERRAL            (F41.9) Anxiety  Comment:   Plan: LORazepam (ATIVAN) 0.5 MG tablet              PLAN:  Orders Placed This Encounter     XR Hip Right 1 View     PHYSICAL THERAPY REFERRAL     celecoxib (CELEBREX) 100 MG capsule     LORazepam (ATIVAN) 0.5 MG tablet   Recheck in clinic as needed.      There are no Patient Instructions on file for this visit.      Slick Mena

## 2017-08-22 NOTE — MR AVS SNAPSHOT
"              After Visit Summary   8/22/2017    Jameel Barrett    MRN: [id number]           Patient Information     Date Of Birth          1940        Visit Information        Provider Department      8/22/2017 2:20 PM Slick Mena MD [facility]        Today's Diagnoses     Hip pain, right    -  1    Lumbar radiculopathy        Anxiety           Follow-ups after your visit        Additional Services     PHYSICAL THERAPY REFERRAL       *This therapy referral will be filtered to a centralized scheduling office at [facility] and the patient will receive a call to schedule an appointment at a [facility] location most convenient for them. *   Right hip pain r/o radiculopathy    [facility] provides Physical Therapy evaluation and treatment and many specialty services across the [facility] system.  If requesting a specialty program, please choose from the list below.    If you have not heard from the scheduling office within 2 business days, please call [phone] for all locations, with the exception of [place], please call [phone].  Treatment: Evaluation & Treatment  Special Instructions/Modalities: routine  Special Programs:     Please be aware that coverage of these services is subject to the terms and limitations of your health insurance plan.  Call member services at your health plan with any benefit or coverage questions.      **Note to Provider:  If you are referring outside of [facility] for the therapy appointment, please list the name of the location in the \"special instructions\" above, print the referral and give to the patient to schedule the appointment.                  Who to contact     If you have questions or need follow up information about today's clinic visit or your schedule please contact [facility] directly at [phone].  Normal or non-critical lab and imaging results will be communicated to you by " MyChart, letter or phone within 4 business days after the clinic has received the results. If you do not hear from us within 7 days, please contact the clinic through [employer] or phone. If you have a critical or abnormal lab result, we will notify you by phone as soon as possible.  Submit refill requests through [employer] or call your pharmacy and they will forward the refill request to us. Please allow 3 business days for your refill to be completed.          Additional Information About Your Visit        [employer] Information     [employer] gives you secure access to your electronic health record. If you see a primary care provider, you can also send messages to your care team and make appointments. If you have questions, please call your primary care clinic.  If you do not have a primary care provider, please call [phone] and they will assist you.        Care EveryWhere ID     This is your Care EveryWhere ID. This could be used by other organizations to access your [facility] medical records  [id number]        Your Vitals Were     Pulse Temperature Pulse Oximetry BMI (Body Mass Index)          71 96.6  F (35.9  C) (Oral) 97% 28.12 kg/m2         Blood Pressure from Last 3 Encounters:   08/22/17 122/73   08/13/17 137/81   06/15/17 137/72    Weight from Last 3 Encounters:   08/22/17 231 lb (104.8 kg)   08/13/17 230 lb (104.3 kg)   06/15/17 228 lb 9.6 oz (103.7 kg)              We Performed the Following     PHYSICAL THERAPY REFERRAL          Today's Medication Changes          These changes are accurate as of: 8/22/17  3:13 PM.  If you have any questions, ask your nurse or doctor.               Start taking these medicines.        Dose/Directions    celecoxib 100 MG capsule   Commonly known as:  celeBREX   Used for:  Lumbar radiculopathy   Started by:  Slick Mena MD        Dose:  100 mg   Take 1 capsule (100 mg) by mouth 2 times daily as needed for moderate pain   Quantity:  60 capsule   Refills:  11          These medicines have changed or have updated prescriptions.        Dose/Directions    LORazepam 0.5 MG tablet   Commonly known as:  ATIVAN   This may have changed:  when to take this   Used for:  Anxiety   Changed by:  Slick Mena MD        Dose:  0.5 mg   Take 1 tablet (0.5 mg) by mouth daily as needed for anxiety   Quantity:  30 tablet   Refills:  0            Where to get your medicines      These medications were sent to [facility] [address] [place] [address] - [address]   [address] [facility] [address]     Phone:  [phone]     celecoxib 100 MG capsule         Some of these will need a paper prescription and others can be bought over the counter.  Ask your nurse if you have questions.     Bring a paper prescription for each of these medications     LORazepam 0.5 MG tablet                Primary Care Provider Office Phone # Fax #    Slick Mena [phone] [address] [facility] [address]        Equal Access to Services     MARIS DERAS AH: Hadii shilo ku hadasho Soomaali, waaxda luqadaha, qaybta kaalmada adeegyada, waxay idiin hayaan calvin cramer . So [facility] [phone].    ATENCIÓN: Si habla español, tiene a brown disposición servicios gratuitos de asistencia lingüística. Llame al [phone].    We comply with applicable federal civil rights laws and [place] laws. We do not discriminate on the basis of race, color, national origin, age, disability sex, sexual orientation or gender identity.            Thank you!     Thank you for choosing [facility]  for your care. Our goal is always to provide you with excellent care. Hearing back from our patients is one way we can continue to improve our services. Please take a few minutes to complete the written survey that you may receive in the mail after your visit with us. Thank you!             Your Updated Medication List - Protect others around you: Learn how to safely  use, store and throw away your medicines at www.disposemymeds.org.          This list is accurate as of: 8/22/17  3:13 PM.  Always use your most recent med list.                   Brand Name Dispense Instructions for use Diagnosis    aspirin 81 MG tablet      Take 1 tablet by mouth daily.        atorvastatin 20 MG tablet    LIPITOR     Take 10 mg by mouth daily        celecoxib 100 MG capsule    celeBREX    60 capsule    Take 1 capsule (100 mg) by mouth 2 times daily as needed for moderate pain    Lumbar radiculopathy       loratadine 10 MG tablet    CLARITIN     Take 10 mg by mouth daily        LORazepam 0.5 MG tablet    ATIVAN    30 tablet    Take 1 tablet (0.5 mg) by mouth daily as needed for anxiety    Anxiety       MULTI-VITAMIN PO      Take  by mouth.        triamterene-hydrochlorothiazide 37.5-25 MG per tablet    MAXZIDE-25     Take 1 tablet by mouth

## 2017-08-22 NOTE — NURSING NOTE
"Chief Complaint   Patient presents with     RECHECK     from ED 8/13/17 Anxiety reaction     follow up     referral for heart valve       Initial /73 (BP Location: Right arm, Patient Position: Chair, Cuff Size: Adult Large)  Pulse 71  Temp 96.6  F (35.9  C) (Oral)  Wt 231 lb (104.8 kg)  SpO2 97%  BMI 28.12 kg/m2 Estimated body mass index is 28.12 kg/(m^2) as calculated from the following:    Height as of 8/13/17: 6' 4\" (1.93 m).    Weight as of this encounter: 231 lb (104.8 kg).  Medication Reconciliation: complete   Meenu Christianson CMA       "

## 2017-08-29 PROBLEM — F41.9 ANXIETY: Status: ACTIVE | Noted: 2017-08-29

## 2017-08-30 ENCOUNTER — HOSPITAL ENCOUNTER (OUTPATIENT)
Dept: PHYSICAL THERAPY | Facility: CLINIC | Age: 77
Setting detail: THERAPIES SERIES
End: 2017-08-30
Attending: FAMILY MEDICINE
Payer: MEDICARE

## 2017-08-30 PROCEDURE — 97140 MANUAL THERAPY 1/> REGIONS: CPT | Mod: GP | Performed by: PHYSICAL THERAPIST

## 2017-08-30 PROCEDURE — 97161 PT EVAL LOW COMPLEX 20 MIN: CPT | Mod: GP | Performed by: PHYSICAL THERAPIST

## 2017-08-30 PROCEDURE — G8978 MOBILITY CURRENT STATUS: HCPCS | Mod: GP,CJ | Performed by: PHYSICAL THERAPIST

## 2017-08-30 PROCEDURE — 97110 THERAPEUTIC EXERCISES: CPT | Mod: GP | Performed by: PHYSICAL THERAPIST

## 2017-08-30 PROCEDURE — 40000718 ZZHC STATISTIC PT DEPARTMENT ORTHO VISIT: Performed by: PHYSICAL THERAPIST

## 2017-08-30 PROCEDURE — G8979 MOBILITY GOAL STATUS: HCPCS | Mod: GP,CI | Performed by: PHYSICAL THERAPIST

## 2017-08-30 NOTE — PROGRESS NOTES
Jameel Barrett  1940 Physical Therapy Initial Evaluation  08/30/17 1400   General Information   Type of Visit Initial OP Ortho PT Evaluation   Start of Care Date 08/30/17   Referring Physician Slick Mena   Patient/Family Goals Statement Sleep without waking up to back pain   Orders Evaluate and Treat   Date of Order 08/22/17   Insurance Type Medicare;Other  (AARP)   Insurance Comments/Visits Authorized [id number]   Medical Diagnosis Lumbar radiculopathy / Hip pain, right    Surgical/Medical history reviewed Yes   Precautions/Limitations no known precautions/limitations   Body Part(s)   Body Part(s) Lumbar Spine/SI   Presentation and Etiology   Pertinent history of current problem (include personal factors and/or comorbidities that impact the POC) Had a hip replacement on left side and then started having numbness on right leg. Had increased back pain after carrying 2 bags in the airport. Has trouble sleeping. Has been waking up several times throughout the night. Getting up and moving will decrease pain. No pain in back area. / Comorbidities - Coronary artery disease, anxiety     Impairments A. Pain;K. Numbness;M. Locking or catching   Functional Limitations perform activities of daily living;perform desired leisure / sports activities   Symptom Location Right LE in L4-L5 dermatomes   How/Where did it occur From insidious onset   Onset date of current episode/exacerbation 05/01/17   Chronicity Chronic   Pain rating (0-10 point scale) Best (/10);Worst (/10)   Best (/10) 3   Worst (/10) 7   Pain quality A. Sharp;C. Aching;E. Shooting;F. Stabbing   Frequency of pain/symptoms D. Other   Pain frequency comment After lifting weights   Pain/symptoms are: Worse during the night   Pain/symptoms exacerbated by A. Sitting;C. Lifting;D. Carrying   Pain/symptoms eased by B. Walking;C. Rest;F. Certain positions;G. Heat;I. OTC medication(s)   Progression of symptoms since onset: Worsened   Current / Previous Interventions    Diagnostic Tests: X-ray   X-ray Results Results   X-ray results IMPRESSION: Degenerative changes of the right hip.    Prior Level of Function   Functional Level Prior Comment Ind   Current Level of Function   Patient role/employment history F. Retired   Living environment House/townhome   Home/community accessibility 1 flight to basement with rail   Current equipment-Gait/Locomotion None   Fall Risk Screen   Fall screen completed by PT   Per patient - Fall 2 or more times in past year? No   Per patient - Fall with injury in past year? No   Is patient a fall risk? No   Lumbar Spine/SI Objective Findings   Gait/Locomotion Antalgic gait with unloading to uninvolved LE / Minimal lumbar rotation and arm swing   Flexion ROM 18 inches from floor   Extension ROM 15 degrees   Right Side Bending ROM 4 inches from knee   Left Side Bending ROM 4 inches from knee   Repeated Extension-Standing ROM No change in symptoms   Repeated Flexion-Standing ROM Increased pain in shin   Pelvic Screen Negative for SI provocation tests (SI gapping, SI compression, Sacral thrust, Thigh thrust)   Hip Flexion (L2) Strength 4/5 on right and 5/5 on left   Hip Abduction Strength 4-/5 on on right and 4/5 on left   Hip Extension Strength 4-/5 on on right and 4/5 on left   Knee Extension (L3) Strength 5/5 B   Ankle Dorsiflexion (L4) Strength 5/5 B   Great Toe Extension (L5) Strength 5/5 B   Hamstring Flexibility Severely limited B   Quadricep Flexibility Moderately restricted B   Piriformis Flexibility Severely limited on right   SLR Negative B   Slump Test Negative B   Segmental Mobility Restricted L3-L5 - no pain reported   Patellar Tendon Reflexes  2+ B   Palpation ASIS on right rotated forward as compared to left   Planned Therapy Interventions   Planned Therapy Interventions joint mobilization;manual therapy;neuromuscular re-education;strengthening;stretching;ROM   Planned Modality Interventions   Planned Modality Interventions Cryotherapy;Hot  packs   Clinical Impression   Criteria for Skilled Therapeutic Interventions Met yes, treatment indicated   PT Diagnosis Right lower extremity pain likely due to radicular symptoms leading to strength and flexibility deficits of right hip and lumbar musculature.   Influenced by the following impairments Pain, flexibility deficits, strength deficits   Functional limitations due to impairments Difficulty sleeping, stair climbing, walking   Clinical Presentation Stable/Uncomplicated   Clinical Presentation Rationale 2 comorbidities impacting PT / 1 body system / Stable   Clinical Decision Making (Complexity) Low complexity   Therapy Frequency 1 time/week   Predicted Duration of Therapy Intervention (days/wks) 8 weeks   Risk & Benefits of therapy have been explained Yes   Patient, Family & other staff in agreement with plan of care Yes   Education Assessment   Preferred Learning Style Listening;Reading;Demonstration;Pictures/video   Barriers to Learning No barriers   ORTHO GOALS   PT Ortho Eval Goals 1;2;3;4   Ortho Goal 1   Goal Identifier HEP   Goal Description Pt will be independent in HEP in order to achieve and maintain long term treatment goals.   Target Date 09/13/17   Ortho Goal 2   Goal Identifier Sleeping   Goal Description Pt will be able to sleep through the night without waking up due to back pain.   Target Date 10/25/17   Ortho Goal 3   Goal Identifier Stairs   Goal Description Pt will be able to ascend and descend 1 flight of stairs with a minimal increase in low back pain 1-2/10.   Target Date 10/25/17   Ortho Goal 4   Goal Identifier Walk 2 miles   Goal Description Pt will be able to walk 2 miles without having to stop due to low back pain.   Target Date 10/25/17   Total Evaluation Time   Total Evaluation Time 20   Therapy Certification   Certification date from 08/30/17   Certification date to 10/25/17   Medical Diagnosis Lumbar radiculopathy / Hip pain, right      Jd Beatty, PT, DPT

## 2017-08-30 NOTE — PROGRESS NOTES
[facility]          OUTPATIENT PHYSICAL THERAPY ORTHOPEDIC EVALUATION  PLAN OF TREATMENT FOR OUTPATIENT REHABILITATION  (COMPLETE FOR INITIAL CLAIMS ONLY)  Patient's Last Name, First Name, M.I.  YOB: 1940  Jameel Barrett       Provider s Name:  [facility]   Medical Record No.  [id number]   Start of Care Date:  08/30/17   Onset Date:  05/01/17   Type:     _X__PT   ___OT   ___SLP Medical Diagnosis:  Lumbar radiculopathy / Hip pain, right      PT Diagnosis:  Right lower extremity pain likely due to radicular symptoms leading to strength and flexibility deficits of right hip and lumbar musculature.   Visits from SOC:  1      _________________________________________________________________________________  Plan of Treatment/Functional Goals:  joint mobilization, manual therapy, neuromuscular re-education, strengthening, stretching, ROM     Cryotherapy, Hot packs     Goals  Goal Identifier: HEP  Goal Description: Pt will be independent in HEP in order to achieve and maintain long term treatment goals.  Target Date: 09/13/17    Goal Identifier: Sleeping  Goal Description: Pt will be able to sleep through the night without waking up due to back pain.  Target Date: 10/25/17    Goal Identifier: Stairs  Goal Description: Pt will be able to ascend and descend 1 flight of stairs with a minimal increase in low back pain 1-2/10.  Target Date: 10/25/17    Goal Identifier: Walk 2 miles  Goal Description: Pt will be able to walk 2 miles without having to stop due to low back pain.  Target Date: 10/25/17                                                Therapy Frequency:  1 time/week  Predicted Duration of Therapy Intervention:  8 weeks    Justice Beatty PT                 I CERTIFY THE NEED FOR THESE SERVICES FURNISHED UNDER        THIS PLAN OF TREATMENT AND WHILE UNDER MY CARE     (Physician co-signature of this document indicates review and certification of the therapy  plan).                         Certification Date From:  08/30/17   Certification Date To:  10/25/17    Referring Provider:  Slick Mena    Initial Assessment        See Epic Evaluation Start of Care Date: 08/30/17

## 2017-09-05 ENCOUNTER — HOSPITAL ENCOUNTER (OUTPATIENT)
Dept: PHYSICAL THERAPY | Facility: CLINIC | Age: 77
Setting detail: THERAPIES SERIES
End: 2017-09-05
Attending: FAMILY MEDICINE
Payer: MEDICARE

## 2017-09-05 PROCEDURE — 40000718 ZZHC STATISTIC PT DEPARTMENT ORTHO VISIT: Performed by: PHYSICAL THERAPIST

## 2017-09-05 PROCEDURE — 97110 THERAPEUTIC EXERCISES: CPT | Mod: GP | Performed by: PHYSICAL THERAPIST

## 2017-09-11 ENCOUNTER — HOSPITAL ENCOUNTER (OUTPATIENT)
Dept: PHYSICAL THERAPY | Facility: CLINIC | Age: 77
Setting detail: THERAPIES SERIES
End: 2017-09-11
Attending: FAMILY MEDICINE
Payer: MEDICARE

## 2017-09-11 PROCEDURE — G8980 MOBILITY D/C STATUS: HCPCS | Mod: GP,CI | Performed by: PHYSICAL THERAPIST

## 2017-09-11 PROCEDURE — 40000718 ZZHC STATISTIC PT DEPARTMENT ORTHO VISIT: Performed by: PHYSICAL THERAPIST

## 2017-09-11 PROCEDURE — 97110 THERAPEUTIC EXERCISES: CPT | Mod: GP | Performed by: PHYSICAL THERAPIST

## 2017-09-11 PROCEDURE — G8979 MOBILITY GOAL STATUS: HCPCS | Mod: GP,CI | Performed by: PHYSICAL THERAPIST

## 2017-09-11 NOTE — PROGRESS NOTES
Outpatient Physical Therapy Discharge Note     Patient: Jameel Barrett  : 1940    Beginning/End Dates of Reporting Period:  2017 to 2017    Referring Provider: Slick Corbin Diagnosis: Lumbar Radiculopathy     Client Self Report: pt states he is doing well, going to [facility] on the  so he needs to d/c. pt states he is feeling better, the stretches are helping.    Goals:  Goal Identifier HEP   Goal Description Pt will be independent in HEP in order to achieve and maintain long term treatment goals.   Target Date 17   Date Met  17   Progress:     Goal Identifier Sleeping   Goal Description Pt will be able to sleep through the night without waking up due to back pain.   Target Date 10/25/17   Date Met  17   Progress:     Goal Identifier Stairs   Goal Description Pt will be able to ascend and descend 1 flight of stairs with a minimal increase in low back pain -2/10.   Target Date 10/25/17   Date Met  17   Progress:     Goal Identifier Walk 2 miles   Goal Description Pt will be able to walk 2 miles without having to stop due to low back pain.   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            Plan:  Discharge from therapy.    Discharge:    Reason for Discharge: Patient has met all goals.    Equipment Issued: therapy ball    Discharge Plan: Patient to continue home program.

## 2018-05-23 ENCOUNTER — TELEPHONE (OUTPATIENT)
Dept: FAMILY MEDICINE | Facility: CLINIC | Age: 78
End: 2018-05-23

## 2018-05-23 ENCOUNTER — OFFICE VISIT (OUTPATIENT)
Dept: FAMILY MEDICINE | Facility: CLINIC | Age: 78
End: 2018-05-23
Payer: MEDICARE

## 2018-05-23 VITALS
HEART RATE: 80 BPM | HEIGHT: 76 IN | WEIGHT: 218 LBS | DIASTOLIC BLOOD PRESSURE: 62 MMHG | SYSTOLIC BLOOD PRESSURE: 108 MMHG | BODY MASS INDEX: 26.55 KG/M2 | TEMPERATURE: 98.4 F

## 2018-05-23 DIAGNOSIS — M16.11 PRIMARY OSTEOARTHRITIS OF RIGHT HIP: Primary | ICD-10-CM

## 2018-05-23 PROCEDURE — 99213 OFFICE O/P EST LOW 20 MIN: CPT | Performed by: FAMILY MEDICINE

## 2018-05-23 RX ORDER — ALBUTEROL SULFATE 90 UG/1
2 AEROSOL, METERED RESPIRATORY (INHALATION) EVERY 4 HOURS PRN
COMMUNITY
End: 2019-07-29

## 2018-05-23 RX ORDER — CARVEDILOL 3.12 MG/1
3.12 TABLET ORAL
COMMUNITY
Start: 2018-05-10 | End: 2018-08-07

## 2018-05-23 RX ORDER — TORSEMIDE 5 MG/1
10 TABLET ORAL
COMMUNITY
Start: 2018-05-10 | End: 2018-08-07 | Stop reason: DRUGHIGH

## 2018-05-23 RX ORDER — SPIRONOLACTONE 25 MG/1
12.5 TABLET ORAL DAILY
COMMUNITY
Start: 2018-05-10 | End: 2020-07-23

## 2018-05-23 NOTE — TELEPHONE ENCOUNTER
Reason for Call:  Right hip pain and falls    Detailed comments: patient is calling and stating he cannot get into Dr. [address] until June 9th, but he has Right hip pain, along with it giving out and then he collapses. Wondering if he should be seen sooner, as he is falling, quite often.    Phone Number Patient can be reached at: Home number on file [phone] (home)    Best Time: any    Can we leave a detailed message on this number? YES     Jenna Khanna  [facility]  [facility]      Call taken on 5/23/2018 at 10:31 AM by Jenna Khanna

## 2018-05-23 NOTE — TELEPHONE ENCOUNTER
Spoke with patient who has history of arthritic pain and right hip pain that is worsening over time and the patient said he caught himself from falling 3 times but states has not once fallen to ground in the past year, says it is constant pain, no injury, able to stand and walk but is painful to walk and stand, not using a cane or walker, says he feels he is losing strength in the right hip and knows he needs to have it looked at for possible repair. Not swollen or red, but the patient says right leg is smaller in size to the left leg. Advised to be seen today, spoke with Dr. Gardner who allows a release of her hold to see patient today at 1:20, patient called and says this time will work and will have his wife drive him and help in car, told the patient we have wheelchairs to use too, patient will use if needed.    GOVIND Perez

## 2018-05-23 NOTE — PROGRESS NOTES
SUBJECTIVE:   Jameel Barrett is a 78 year old male who presents to clinic today for the following health issues:    Joint Pain    Onset: started last October 2017 and has been getting worse.     Description:   Location: right hip  Character: Sharp and Dull ache    Intensity: moderate    Progression of Symptoms: worse    Accompanying Signs & Symptoms:  Other symptoms: radiation of pain to lower leg, he is losing strength in his right leg. Has a hard time sitting for a long period of time and then getting up. He has almost fallen 3 times due to this.     History:   Previous similar pain: no       Precipitating factors:   Trauma or overuse: no     Alleviating factors:  Improved by: nothing    Therapies Tried and outcome: 1000mg Acetaminophen in the last two days     ADDITIONAL HPI: 78 year old male here for above issue.  Right thigh numbness and electric shock pains down right leg.  Pain seems to be localized to right hip.    ROS: 10 point review of systems negative except as per HPI.    PAST MEDICAL HISTORY:  Past Medical History:   Diagnosis Date     Coronary artery disease      Hypertension      Skin cancer         ACTIVE MEDICAL PROBLEMS:  Patient Active Problem List   Diagnosis     Hyperlipidemia LDL goal <100     Neck pain     Headache     Ptosis-episodic     Diplopia     CAD (coronary artery disease)     Anxiety        FAMILY HISTORY:  History reviewed. No pertinent family history.    SOCIAL HISTORY:  Social History     Social History     Marital status:      Spouse name: N/A     Number of children: N/A     Years of education: N/A     Occupational History     Not on file.     Social History Main Topics     Smoking status: Never Smoker     Smokeless tobacco: Never Used     Alcohol use Yes      Comment: 1 drink daily     Drug use: Not on file     Sexual activity: Not Currently     Other Topics Concern     Not on file     Social History Narrative       MEDICATIONS:  Current Outpatient Prescriptions  "  Medication Sig Dispense Refill     albuterol (PROAIR HFA/PROVENTIL HFA/VENTOLIN HFA) 108 (90 Base) MCG/ACT Inhaler Inhale 2 puffs into the lungs       aspirin 81 MG tablet Take 1 tablet by mouth daily.       atorvastatin (LIPITOR) 20 MG tablet Take 10 mg by mouth daily        carvedilol (COREG) 3.125 MG tablet Take 3.125 mg by mouth       loratadine (CLARITIN) 10 MG tablet Take 10 mg by mouth daily       Multiple Vitamin (MULTI-VITAMIN PO) Take  by mouth.       spironolactone (ALDACTONE) 25 MG tablet Take 12.5 mg by mouth       torsemide (DEMADEX) 5 MG tablet Take 10 mg by mouth         ALLERGIES:   No Known Allergies    Problem list, Medication list, Allergies, and Medical/Social/Surgical histories reviewed in [facility] and updated as appropriate.    OBJECTIVE:                                                    VITALS: /62 (BP Location: Right arm, Patient Position: Chair, Cuff Size: Adult Regular)  Pulse 80  Temp 98.4  F (36.9  C) (Tympanic)  Ht 6' 4\" (1.93 m)  Wt 218 lb (98.9 kg)  BMI 26.54 kg/m2 Body mass index is 26.54 kg/(m^2).  GENERAL: Pleasant, well appearing male.  MUSCULOSKELETAL:  Back shows no tenderness to palpation. Straight leg raise is negative for radicular symptoms.  Pain with internal and external right hip rotation.  Significantly limited range of motion with internal and external hip rotation.    RIGHT HIP TWO VIEWS August 22, 2017 2:52 PM     HISTORY: Pain in right hip.     COMPARISON: None.     FINDINGS: No fracture or osseous lesion is seen. There is mild joint  space loss in the right hip with moderate marginal osteophyte  formation. No other abnormality is seen.         IMPRESSION: Degenerative changes of the right hip.     ASSESSMENT/PLAN:                                                    1. Primary osteoarthritis of right hip  History was initially concerning for sciatica symptoms however exam shows significant hip pathology and a relatively benign back/radicular exam.  He " completed a course of physical therapy last fall.  We will have him follow-up with Ortho to discuss management options.  - ORTHO  REFERRAL

## 2018-05-23 NOTE — NURSING NOTE
"Initial /62 (BP Location: Right arm, Patient Position: Chair, Cuff Size: Adult Regular)  Pulse 80  Temp 98.4  F (36.9  C) (Tympanic)  Ht 6' 4\" (1.93 m)  Wt 218 lb (98.9 kg)  BMI 26.54 kg/m2 Estimated body mass index is 26.54 kg/(m^2) as calculated from the following:    Height as of this encounter: 6' 4\" (1.93 m).    Weight as of this encounter: 218 lb (98.9 kg). .    Rupali Ervin CMA ([facility])  "

## 2018-05-23 NOTE — MR AVS SNAPSHOT
After Visit Summary   5/23/2018    Jameel Barrett    MRN: [id number]           Patient Information     Date Of Birth          1940        Visit Information        Provider Department      5/23/2018 1:20 PM Kendra Mena MD [facility]        Today's Diagnoses     Primary osteoarthritis of right hip    -  1       Follow-ups after your visit        Additional Services     ORTHO  REFERRAL       [facility] is referring you to the Orthopedic  Services at [facility].       The  Representative will assist you in the coordination of your Orthopedic and Musculoskeletal Care as prescribed by your physician.    The  Representative will call you within 1 business day to help schedule your appointment, or you may contact the  Representative at:    All areas ~ [phone]     Type of Referral : Surgical / Specialist       Timeframe requested: Routine    Coverage of these services is subject to the terms and limitations of your health insurance plan.  Please call member services at your health plan with any benefit or coverage questions.      If X-rays, CT or MRI's have been performed, please contact the facility where they were done to arrange for , prior to your scheduled appointment.  Please bring this referral request to your appointment and present it to your specialist.                  Your next 10 appointments already scheduled     Jun 08, 2018 12:40 PM CDT   Office Visit with Azeb Street MD   [facility] ([facility])    [address] [facility] [id number] [phone]           Bring a current list of meds and any records pertaining to this visit. For Physicals, please bring immunization records and any forms needing to be filled out. Please arrive 10 minutes early to complete paperwork.              Who to contact     If you have  "questions or need follow up information about today's clinic visit or your schedule please contact [facility] directly at [phone].  Normal or non-critical lab and imaging results will be communicated to you by MyChart, letter or phone within 4 business days after the clinic has received the results. If you do not hear from us within 7 days, please contact the clinic through MyChart or phone. If you have a critical or abnormal lab result, we will notify you by phone as soon as possible.  Submit refill requests through [employer] or call your pharmacy and they will forward the refill request to us. Please allow 3 business days for your refill to be completed.          Additional Information About Your Visit        [employer] Information     [employer] gives you secure access to your electronic health record. If you see a primary care provider, you can also send messages to your care team and make appointments. If you have questions, please call your primary care clinic.  If you do not have a primary care provider, please call [phone] and they will assist you.        Care EveryWhere ID     This is your Care EveryWhere ID. This could be used by other organizations to access your [place] medical records  [id number]        Your Vitals Were     Pulse Temperature Height BMI (Body Mass Index)          80 98.4  F (36.9  C) (Tympanic) 6' 4\" (1.93 m) 26.54 kg/m2         Blood Pressure from Last 3 Encounters:   05/23/18 108/62   08/22/17 122/73   08/13/17 137/81    Weight from Last 3 Encounters:   05/23/18 218 lb (98.9 kg)   08/22/17 231 lb (104.8 kg)   08/13/17 230 lb (104.3 kg)              We Performed the Following     ORTHO  REFERRAL        Primary Care Provider Office Phone # Fax #    Slick Mena [phone]       [address] [facility] [address]        Equal Access to Services     KIERAN DERAS AH: Hadii shilo scotto Sotess, waaxda luqadaha, qaybta " sheldon lujanmaximus cramer ah. [facility] [phone].    ATENCIÓN: Si samina talley, tiene a brown disposición servicios gratuitos de asistencia lingüística. Lenard al [phone].    We comply with applicable federal civil rights laws and [place] laws. We do not discriminate on the basis of race, color, national origin, age, disability, sex, sexual orientation, or gender identity.            Thank you!     Thank you for choosing [facility]  for your care. Our goal is always to provide you with excellent care. Hearing back from our patients is one way we can continue to improve our services. Please take a few minutes to complete the written survey that you may receive in the mail after your visit with us. Thank you!             Your Updated Medication List - Protect others around you: Learn how to safely use, store and throw away your medicines at www.disposemymeds.org.          This list is accurate as of 5/23/18  2:12 PM.  Always use your most recent med list.                   Brand Name Dispense Instructions for use Diagnosis    albuterol 108 (90 Base) MCG/ACT Inhaler    PROAIR HFA/PROVENTIL HFA/VENTOLIN HFA     Inhale 2 puffs into the lungs        aspirin 81 MG tablet      Take 1 tablet by mouth daily.        atorvastatin 20 MG tablet    LIPITOR     Take 10 mg by mouth daily        carvedilol 3.125 MG tablet    COREG     Take 3.125 mg by mouth        loratadine 10 MG tablet    CLARITIN     Take 10 mg by mouth daily        MULTI-VITAMIN PO      Take  by mouth.        spironolactone 25 MG tablet    ALDACTONE     Take 12.5 mg by mouth        torsemide 5 MG tablet    DEMADEX     Take 10 mg by mouth

## 2018-07-13 ENCOUNTER — OFFICE VISIT (OUTPATIENT)
Dept: FAMILY MEDICINE | Facility: CLINIC | Age: 78
End: 2018-07-13
Payer: MEDICARE

## 2018-07-13 VITALS
DIASTOLIC BLOOD PRESSURE: 68 MMHG | BODY MASS INDEX: 26.9 KG/M2 | OXYGEN SATURATION: 98 % | HEART RATE: 77 BPM | RESPIRATION RATE: 16 BRPM | WEIGHT: 221 LBS | SYSTOLIC BLOOD PRESSURE: 112 MMHG | TEMPERATURE: 96.8 F

## 2018-07-13 DIAGNOSIS — M16.11 PRIMARY OSTEOARTHRITIS OF RIGHT HIP: ICD-10-CM

## 2018-07-13 DIAGNOSIS — E78.5 HYPERLIPIDEMIA LDL GOAL <100: ICD-10-CM

## 2018-07-13 DIAGNOSIS — I25.118 CORONARY ARTERY DISEASE OF NATIVE ARTERY OF NATIVE HEART WITH STABLE ANGINA PECTORIS (H): ICD-10-CM

## 2018-07-13 DIAGNOSIS — Z01.818 PREOP GENERAL PHYSICAL EXAM: Primary | ICD-10-CM

## 2018-07-13 DIAGNOSIS — N47.1 PHIMOSIS: ICD-10-CM

## 2018-07-13 DIAGNOSIS — E85.82 WILD-TYPE TRANSTHYRETIN-RELATED (ATTR) AMYLOIDOSIS (H): ICD-10-CM

## 2018-07-13 LAB
ANION GAP SERPL CALCULATED.3IONS-SCNC: 5 MMOL/L (ref 3–14)
BASOPHILS # BLD AUTO: 0 10E9/L (ref 0–0.2)
BASOPHILS NFR BLD AUTO: 0.4 %
BUN SERPL-MCNC: 19 MG/DL (ref 7–30)
CALCIUM SERPL-MCNC: 9.8 MG/DL (ref 8.5–10.1)
CHLORIDE SERPL-SCNC: 106 MMOL/L (ref 94–109)
CO2 SERPL-SCNC: 29 MMOL/L (ref 20–32)
CREAT SERPL-MCNC: 1.09 MG/DL (ref 0.66–1.25)
DIFFERENTIAL METHOD BLD: NORMAL
EOSINOPHIL # BLD AUTO: 0.4 10E9/L (ref 0–0.7)
EOSINOPHIL NFR BLD AUTO: 5 %
ERYTHROCYTE [DISTWIDTH] IN BLOOD BY AUTOMATED COUNT: 14.6 % (ref 10–15)
GFR SERPL CREATININE-BSD FRML MDRD: 65 ML/MIN/1.7M2
GLUCOSE SERPL-MCNC: 121 MG/DL (ref 70–99)
HCT VFR BLD AUTO: 41.7 % (ref 40–53)
HGB BLD-MCNC: 13.7 G/DL (ref 13.3–17.7)
LYMPHOCYTES # BLD AUTO: 1.4 10E9/L (ref 0.8–5.3)
LYMPHOCYTES NFR BLD AUTO: 19 %
MCH RBC QN AUTO: 30 PG (ref 26.5–33)
MCHC RBC AUTO-ENTMCNC: 32.9 G/DL (ref 31.5–36.5)
MCV RBC AUTO: 91 FL (ref 78–100)
MONOCYTES # BLD AUTO: 0.7 10E9/L (ref 0–1.3)
MONOCYTES NFR BLD AUTO: 9.9 %
NEUTROPHILS # BLD AUTO: 4.7 10E9/L (ref 1.6–8.3)
NEUTROPHILS NFR BLD AUTO: 65.7 %
PLATELET # BLD AUTO: 253 10E9/L (ref 150–450)
POTASSIUM SERPL-SCNC: 3.9 MMOL/L (ref 3.4–5.3)
RBC # BLD AUTO: 4.56 10E12/L (ref 4.4–5.9)
SODIUM SERPL-SCNC: 140 MMOL/L (ref 133–144)
WBC # BLD AUTO: 7.1 10E9/L (ref 4–11)

## 2018-07-13 PROCEDURE — 85025 COMPLETE CBC W/AUTO DIFF WBC: CPT | Performed by: FAMILY MEDICINE

## 2018-07-13 PROCEDURE — 99214 OFFICE O/P EST MOD 30 MIN: CPT | Performed by: FAMILY MEDICINE

## 2018-07-13 PROCEDURE — 80048 BASIC METABOLIC PNL TOTAL CA: CPT | Performed by: FAMILY MEDICINE

## 2018-07-13 PROCEDURE — 36415 COLL VENOUS BLD VENIPUNCTURE: CPT | Performed by: FAMILY MEDICINE

## 2018-07-13 RX ORDER — LORATADINE 10 MG/1
10 CAPSULE, LIQUID FILLED ORAL EVERY OTHER DAY
COMMUNITY
Start: 2017-07-14 | End: 2020-07-23

## 2018-07-13 RX ORDER — ACETAMINOPHEN 500 MG
1000 TABLET ORAL 2 TIMES DAILY
COMMUNITY

## 2018-07-13 RX ORDER — FLUTICASONE PROPIONATE 50 MCG
2 SPRAY, SUSPENSION (ML) NASAL DAILY
COMMUNITY
End: 2020-07-23

## 2018-07-13 ASSESSMENT — PAIN SCALES - GENERAL: PAINLEVEL: MODERATE PAIN (4)

## 2018-07-13 NOTE — PROGRESS NOTES
[facility]  [address] [facility] [address]  [phone]  Dept: [phone]    PRE-OP EVALUATION:  Today's date: 2018    Jameel Barrett (: 1940) presents for pre-operative evaluation assessment as requested by Dr. Sanz.  He requires evaluation and anesthesia risk assessment prior to undergoing surgery/procedure for treatment of severe osteoarthritis of right hip.    Fax number for surgical facility: [phone]  Primary Physician: Dr. Cano  Type of Anesthesia Anticipated: Cardiac Special Anesthesia    Patient has a Health Care Directive or Living Will:  YES at [facility] in [place], [place]    Preop Questions 2018   Who is doing your surgery? Dr. Teodoro Sanz   What are you having done? right hip replacement   Date of Surgery/Procedure: 2018   Facility or Hospital where procedure/surgery will be performed: [facility]   1.  Do you have a history of Heart attack, stroke, stent, coronary bypass surgery, or other heart surgery? YES  -see problem list   2.  Do you ever have any pain or discomfort in your chest? No   3.  Do you have a history of  Heart Failure? No   4.   Are you troubled by shortness of breath when:  walking on a level surface, or up a slight hill, or at night? YES -this is stable and he walks every day, only has trouble going up a hill   5.  Do you currently have a cold, bronchitis or other respiratory infection? No   6.  Do you have a cough, shortness of breath, or wheezing? No   7.  Do you sometimes get pains in the calves of your legs when you walk? No   8. Do you or anyone in your family have previous history of blood clots? No   9.  Do you or does anyone in your family have a serious bleeding problem such as prolonged bleeding following surgeries or cuts? No   10. Have you ever had problems with anemia or been told to take iron pills? No   11. Have you had any abnormal blood loss such as black, tarry or bloody stools? No    12. Have you ever had a blood transfusion? UNKNOWN -    13. Have you or any of your relatives ever had problems with anesthesia? No   14. Do you have sleep apnea, excessive snoring or daytime drowsiness? no   15. Do you have any prosthetic heart valves? No   16. Do you have prosthetic joints? YES -left total hip arthroplasty         HPI:     HPI related to upcoming procedure: He has developed pain in the right hip over the last couple years which is getting progressive responding to      See problem list for active medical problems.  Problems all longstanding and stable, except as noted/documented.  See ROS for pertinent symptoms related to these conditions.                                                                                                                                                          .    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 Provider to review       Ptosis-episodic 06/08/2012     Priority: Medium     Diplopia 06/08/2012     Priority: Medium     Hyperlipidemia LDL goal <100 06/07/2012     Priority: Medium      Past Medical History:   Diagnosis Date     Coronary artery disease      Hypertension      Skin cancer      Past Surgical History:   Procedure Laterality Date     CARDIAC SURGERY  4/8/08      CARDIAC SURGERY  4/12/07     ORTHOPEDIC SURGERY  10/08    LT hip replacment     Current Outpatient Prescriptions   Medication Sig Dispense Refill     albuterol (PROAIR HFA/PROVENTIL HFA/VENTOLIN HFA) 108 (90 Base) MCG/ACT Inhaler Inhale 2 puffs into the lungs       aspirin 81 MG tablet  Take 1 tablet by mouth daily.       atorvastatin (LIPITOR) 20 MG tablet Take 10 mg by mouth daily        carvedilol (COREG) 3.125 MG tablet Take 3.125 mg by mouth       loratadine (CLARITIN) 10 MG tablet Take 10 mg by mouth daily       Multiple Vitamin (MULTI-VITAMIN PO) Take  by mouth.       spironolactone (ALDACTONE) 25 MG tablet Take 12.5 mg by mouth       torsemide (DEMADEX) 5 MG tablet Take 10 mg by mouth       OTC products: None, except as noted above    No Known Allergies   Latex Allergy: NO    Social History   Substance Use Topics     Smoking status: Never Smoker     Smokeless tobacco: Never Used     Alcohol use Yes      Comment: 1 drink daily     History   Drug Use Not on file       REVIEW OF SYSTEMS:   Constitutional, HEENT, cardiovascular, pulmonary, gi and gu systems are negative, except as otherwise noted.    EXAM:   /68 (BP Location: Right arm, Patient Position: Chair, Cuff Size: Adult Large)  Pulse 77  Temp 96.8  F (36  C) (Tympanic)  Resp 16  Wt 221 lb (100.2 kg)  SpO2 98%  BMI 26.9 kg/m2    GENERAL APPEARANCE: healthy, alert and no distress     EYES: EOMI,  PERRL     HENT: ear canals and TM's normal and nose and mouth without ulcers or lesions     NECK: no adenopathy, no asymmetry, masses, or scars and thyroid normal to palpation     RESP: lungs clear to auscultation - no rales, rhonchi or wheezes     CV: regular rates and rhythm, normal S1 S2, no S3 or S4 and no murmur, click or rub     ABDOMEN:  soft, nontender, no HSM or masses and bowel sounds normal     MS: extremities normal- no gross deformities noted, no evidence of inflammation in joints, FROM in all extremities.     SKIN: no suspicious lesions or rashes     NEURO: Normal strength and tone, sensory exam grossly normal, mentation intact and speech normal     PSYCH: mentation appears normal. and affect normal/bright     LYMPHATICS: No cervical adenopathy    DIAGNOSTICS:   Complete cardiac  Evaluation done at [facility].  See  attached copy, but he is cleared for surgery by cardiology    Recent Labs   Lab Test  08/13/17   0016 03/17/15   06/29/11   1345   HGB  13.3   --    --   12.5*   PLT  212   --    --   219   NA  140   --    --   143   POTASSIUM  3.6  4.0   --   4.0   CR  1.09  1.14   < >  1.05    < > = values in this interval not displayed.      Labs drawn in process: CBC and BMP; he had a glycosylated hemoglobin performed at [facility] recently and was normal  IMPRESSION:   Reason for surgery/procedure: Severe osteoarthritis right hip  Diagnosis/reason for consult: Evaluate for anesthesia risk    The proposed surgical procedure is considered LOW risk.    REVISED CARDIAC RISK INDEX  The patient has the following serious cardiovascular risks for perioperative complications such as (MI, PE, VFib and 3  AV Block):  Coronary Artery Disease (MI, positive stress test, angina, Qs on EKG)  INTERPRETATION: 2 risks: Class III (moderate risk - 6.6% complication rate)    The patient has the following additional risks for perioperative complications:  No identified additional risks      ICD-10-CM    1. Preop general physical exam Z01.818    2. Primary osteoarthritis of right hip M16.11    3. Coronary artery disease of native artery of native heart with stable angina pectoris (H) I25.118 CBC with platelets differential     Basic metabolic panel   4. Phimosis N47.1 UROLOGY ADULT REFERRAL   5. Hyperlipidemia LDL goal <100 E78.5    6. Wild-type transthyretin-related (ATTR) amyloidosis E85.82        RECOMMENDATIONS:       Cardiovascular Risk  Patient is already on a Beta Blocker. Continue Betablocker therapy after surgery, using Beta blocker order set as necessary for NPO status.      --Patient is to take all scheduled medications on the day of surgery.    APPROVAL GIVEN to proceed with proposed procedure, without further diagnostic evaluation       Signed Electronically by: LEONA Cano MD    Copy of this evaluation report is provided to requesting  physician.    [place] Preop Guidelines    Revised Cardiac Risk Index

## 2018-07-13 NOTE — PROGRESS NOTES
Jameel,  Your labs were in the acceptable range.  A slightly elevated glucose is acceptable because this was a nonfasting specimen.  We faxed these results over with your preop note to [facility].adilene Cano MD

## 2018-07-13 NOTE — MR AVS SNAPSHOT
After Visit Summary   7/13/2018    Jameel Barrett    MRN: [id number]           Patient Information     Date Of Birth          1940        Visit Information        Provider Department      7/13/2018 9:40 AM LEONA Cano MD [facility]        Today's Diagnoses     Preop general physical exam    -  1    Primary osteoarthritis of right hip        Coronary artery disease of native artery of native heart with stable angina pectoris (H)        Phimosis        Hyperlipidemia LDL goal <100        Wild-type transthyretin-related (ATTR) amyloidosis          Care Instructions      Before Your Surgery      Call your surgeon if there is any change in your health. This includes signs of a cold or flu (such as a sore throat, runny nose, cough, rash or fever).    Do not smoke, drink alcohol or take over the counter medicine (unless your surgeon or primary care doctor tells you to) for the 24 hours before and after surgery.    If you take prescribed drugs: Follow your doctor s orders about which medicines to take and which to stop until after surgery.    Eating and drinking prior to surgery: follow the instructions from your surgeon    Take a shower or bath the night before surgery. Use the soap your surgeon gave you to gently clean your skin. If you do not have soap from your surgeon, use your regular soap. Do not shave or scrub the surgery site.  Wear clean pajamas and have clean sheets on your bed.           Follow-ups after your visit        Additional Services     UROLOGY ADULT REFERRAL       Your provider has referred you to: [facility]: [facility] [phone]   [email or web address]    Please be aware that coverage of these services is subject to the terms and limitations of your health insurance plan.  Call member services at your health plan with any benefit or coverage questions.      Please  bring the following with you to your appointment:    (1) Any X-Rays, CTs or MRIs which have been performed.  Contact the facility where they were done to arrange for  prior to your scheduled appointment.    (2) List of current medications  (3) This referral request   (4) Any documents/labs given to you for this referral                  Who to contact     If you have questions or need follow up information about today's clinic visit or your schedule please contact [facility] directly at [phone].  Normal or non-critical lab and imaging results will be communicated to you by NormOxyshart, letter or phone within 4 business days after the clinic has received the results. If you do not hear from us within 7 days, please contact the clinic through Timehopt or phone. If you have a critical or abnormal lab result, we will notify you by phone as soon as possible.  Submit refill requests through BISON or call your pharmacy and they will forward the refill request to us. Please allow 3 business days for your refill to be completed.          Additional Information About Your Visit        NormOxyshart Information     BISON gives you secure access to your electronic health record. If you see a primary care provider, you can also send messages to your care team and make appointments. If you have questions, please call your primary care clinic.  If you do not have a primary care provider, please call [phone] and they will assist you.        Care EveryWhere ID     This is your Care EveryWhere ID. This could be used by other organizations to access your [place] medical records  [id number]        Your Vitals Were     Pulse Temperature Respirations Pulse Oximetry BMI (Body Mass Index)       77 96.8  F (36  C) (Tympanic) 16 98% 26.9 kg/m2        Blood Pressure from Last 3 Encounters:   07/13/18 112/68   05/23/18 108/62   08/22/17 122/73    Weight from Last 3 Encounters:   07/13/18 221 lb (100.2 kg)    05/23/18 218 lb (98.9 kg)   08/22/17 231 lb (104.8 kg)              We Performed the Following     Basic metabolic panel     CBC with platelets differential     UROLOGY ADULT REFERRAL        Primary Care Provider    Slick Mena MD       No address on file        Equal Access to Services     KIERAN DERAS : Hadii shilo ku robert Sotess, waaxda luqadaha, qaybta kaalmada ademadi, sheldon andersonkaty . So [facility] [phone].    ATENCIÓN: Si habla español, tiene a brown disposición servicios gratuitos de asistencia lingüística. Llame al [phone].    We comply with applicable federal civil rights laws and [place] laws. We do not discriminate on the basis of race, color, national origin, age, disability, sex, sexual orientation, or gender identity.            Thank you!     Thank you for choosing [facility]  for your care. Our goal is always to provide you with excellent care. Hearing back from our patients is one way we can continue to improve our services. Please take a few minutes to complete the written survey that you may receive in the mail after your visit with us. Thank you!             Your Updated Medication List - Protect others around you: Learn how to safely use, store and throw away your medicines at www.disposemymeds.org.          This list is accurate as of 7/13/18 10:41 AM.  Always use your most recent med list.                   Brand Name Dispense Instructions for use Diagnosis    acetaminophen 500 MG tablet    TYLENOL     Take 500 mg by mouth as needed        albuterol 108 (90 Base) MCG/ACT Inhaler    PROAIR HFA/PROVENTIL HFA/VENTOLIN HFA     Inhale 2 puffs into the lungs        aspirin 81 MG tablet      Take 1 tablet by mouth daily.        atorvastatin 20 MG tablet    LIPITOR     Take 10 mg by mouth daily        carvedilol 3.125 MG tablet    COREG     Take 3.125 mg by mouth        CLARITIN 10 MG capsule   Generic drug:  loratadine      Take 10 mg  by mouth twice a week        fluticasone 50 MCG/ACT spray    FLONASE     Spray 2 sprays in nostril daily        MULTI-VITAMIN PO      Take  by mouth.        spironolactone 25 MG tablet    ALDACTONE     Take 12.5 mg by mouth        torsemide 5 MG tablet    DEMADEX     Take 10 mg by mouth

## 2018-07-30 ENCOUNTER — TRANSFERRED RECORDS (OUTPATIENT)
Dept: HEALTH INFORMATION MANAGEMENT | Facility: CLINIC | Age: 78
End: 2018-07-30

## 2018-07-31 LAB
INR PPP: 1.1 <1.3
POTASSIUM SERPL-SCNC: 4 MMOL/L (ref 3.5–5)

## 2018-08-01 LAB
CREAT SERPL-MCNC: 1.17 MG/DL (ref 0.72–1.25)
GFR SERPL CREATININE-BSD FRML MDRD: 60 ML/MIN/1.73M2

## 2018-08-07 ENCOUNTER — RADIANT APPOINTMENT (OUTPATIENT)
Dept: GENERAL RADIOLOGY | Facility: CLINIC | Age: 78
End: 2018-08-07
Attending: INTERNAL MEDICINE
Payer: MEDICARE

## 2018-08-07 ENCOUNTER — HOSPITAL ENCOUNTER (EMERGENCY)
Facility: CLINIC | Age: 78
Discharge: HOME | End: 2018-08-07
Attending: FAMILY MEDICINE | Admitting: FAMILY MEDICINE
Payer: MEDICARE

## 2018-08-07 ENCOUNTER — TELEPHONE (OUTPATIENT)
Dept: FAMILY MEDICINE | Facility: CLINIC | Age: 78
End: 2018-08-07

## 2018-08-07 ENCOUNTER — OFFICE VISIT (OUTPATIENT)
Dept: FAMILY MEDICINE | Facility: CLINIC | Age: 78
End: 2018-08-07
Payer: MEDICARE

## 2018-08-07 ENCOUNTER — APPOINTMENT (OUTPATIENT)
Dept: ULTRASOUND IMAGING | Facility: CLINIC | Age: 78
End: 2018-08-07
Attending: FAMILY MEDICINE
Payer: MEDICARE

## 2018-08-07 VITALS
RESPIRATION RATE: 14 BRPM | SYSTOLIC BLOOD PRESSURE: 119 MMHG | WEIGHT: 225 LBS | HEART RATE: 73 BPM | TEMPERATURE: 97.8 F | DIASTOLIC BLOOD PRESSURE: 71 MMHG | BODY MASS INDEX: 27.39 KG/M2 | OXYGEN SATURATION: 99 %

## 2018-08-07 VITALS
RESPIRATION RATE: 12 BRPM | OXYGEN SATURATION: 98 % | BODY MASS INDEX: 27.42 KG/M2 | WEIGHT: 225.2 LBS | SYSTOLIC BLOOD PRESSURE: 120 MMHG | DIASTOLIC BLOOD PRESSURE: 64 MMHG | HEART RATE: 97 BPM | HEIGHT: 76 IN | TEMPERATURE: 97.2 F

## 2018-08-07 DIAGNOSIS — Z98.890 POSTOPERATIVE STATE: ICD-10-CM

## 2018-08-07 DIAGNOSIS — I82.411 ACUTE DEEP VEIN THROMBOSIS (DVT) OF FEMORAL VEIN OF RIGHT LOWER EXTREMITY (H): ICD-10-CM

## 2018-08-07 DIAGNOSIS — R60.0 LEG EDEMA, RIGHT: Primary | ICD-10-CM

## 2018-08-07 DIAGNOSIS — M25.532 LEFT WRIST PAIN: ICD-10-CM

## 2018-08-07 LAB
ANION GAP SERPL CALCULATED.3IONS-SCNC: 6 MMOL/L (ref 3–14)
APTT PPP: 37 SEC (ref 22–37)
BASOPHILS # BLD AUTO: 0 10E9/L (ref 0–0.2)
BASOPHILS NFR BLD AUTO: 0.3 %
BUN SERPL-MCNC: 22 MG/DL (ref 7–30)
CALCIUM SERPL-MCNC: 9.3 MG/DL (ref 8.5–10.1)
CHLORIDE SERPL-SCNC: 107 MMOL/L (ref 94–109)
CO2 SERPL-SCNC: 28 MMOL/L (ref 20–32)
CREAT SERPL-MCNC: 1.1 MG/DL (ref 0.66–1.25)
D DIMER PPP FEU-MCNC: 2.1 UG/ML FEU (ref 0–0.5)
DIFFERENTIAL METHOD BLD: ABNORMAL
EOSINOPHIL # BLD AUTO: 0.4 10E9/L (ref 0–0.7)
EOSINOPHIL NFR BLD AUTO: 4.7 %
ERYTHROCYTE [DISTWIDTH] IN BLOOD BY AUTOMATED COUNT: 14.4 % (ref 10–15)
GFR SERPL CREATININE-BSD FRML MDRD: 64 ML/MIN/1.7M2
GLUCOSE SERPL-MCNC: 92 MG/DL (ref 70–99)
HCT VFR BLD AUTO: 33.2 % (ref 40–53)
HGB BLD-MCNC: 10.7 G/DL (ref 13.3–17.7)
IMM GRANULOCYTES # BLD: 0.4 10E9/L (ref 0–0.4)
IMM GRANULOCYTES NFR BLD: 4.2 %
INR PPP: 1.13 (ref 0.86–1.14)
LYMPHOCYTES # BLD AUTO: 1.3 10E9/L (ref 0.8–5.3)
LYMPHOCYTES NFR BLD AUTO: 14.1 %
MCH RBC QN AUTO: 30.6 PG (ref 26.5–33)
MCHC RBC AUTO-ENTMCNC: 32.2 G/DL (ref 31.5–36.5)
MCV RBC AUTO: 95 FL (ref 78–100)
MONOCYTES # BLD AUTO: 0.9 10E9/L (ref 0–1.3)
MONOCYTES NFR BLD AUTO: 10.1 %
NEUTROPHILS # BLD AUTO: 6 10E9/L (ref 1.6–8.3)
NEUTROPHILS NFR BLD AUTO: 66.6 %
NRBC # BLD AUTO: 0 10*3/UL
NRBC BLD AUTO-RTO: 0 /100
PLATELET # BLD AUTO: 322 10E9/L (ref 150–450)
POTASSIUM SERPL-SCNC: 4.2 MMOL/L (ref 3.4–5.3)
RBC # BLD AUTO: 3.5 10E12/L (ref 4.4–5.9)
SODIUM SERPL-SCNC: 141 MMOL/L (ref 133–144)
WBC # BLD AUTO: 9 10E9/L (ref 4–11)

## 2018-08-07 PROCEDURE — 99284 EMERGENCY DEPT VISIT MOD MDM: CPT | Mod: 25

## 2018-08-07 PROCEDURE — 99284 EMERGENCY DEPT VISIT MOD MDM: CPT | Mod: Z6 | Performed by: FAMILY MEDICINE

## 2018-08-07 PROCEDURE — 85025 COMPLETE CBC W/AUTO DIFF WBC: CPT | Performed by: FAMILY MEDICINE

## 2018-08-07 PROCEDURE — 36415 COLL VENOUS BLD VENIPUNCTURE: CPT | Performed by: INTERNAL MEDICINE

## 2018-08-07 PROCEDURE — 93971 EXTREMITY STUDY: CPT | Mod: RT

## 2018-08-07 PROCEDURE — 85730 THROMBOPLASTIN TIME PARTIAL: CPT | Performed by: FAMILY MEDICINE

## 2018-08-07 PROCEDURE — 85379 FIBRIN DEGRADATION QUANT: CPT | Performed by: INTERNAL MEDICINE

## 2018-08-07 PROCEDURE — 73110 X-RAY EXAM OF WRIST: CPT | Mod: LT

## 2018-08-07 PROCEDURE — 99214 OFFICE O/P EST MOD 30 MIN: CPT | Performed by: INTERNAL MEDICINE

## 2018-08-07 PROCEDURE — 80048 BASIC METABOLIC PNL TOTAL CA: CPT | Performed by: FAMILY MEDICINE

## 2018-08-07 PROCEDURE — 85610 PROTHROMBIN TIME: CPT | Performed by: FAMILY MEDICINE

## 2018-08-07 RX ORDER — TORSEMIDE 5 MG/1
2 TABLET ORAL DAILY
COMMUNITY
End: 2019-05-30

## 2018-08-07 ASSESSMENT — ENCOUNTER SYMPTOMS
DYSURIA: 0
CONSTIPATION: 0
SHORTNESS OF BREATH: 0
DIARRHEA: 0
SINUS PRESSURE: 0
COUGH: 0
BLOOD IN STOOL: 0
DIAPHORESIS: 0
ABDOMINAL PAIN: 0
SORE THROAT: 0
NAUSEA: 0
FEVER: 0
VOMITING: 0
HEADACHES: 0
PALPITATIONS: 0
WHEEZING: 0
FREQUENCY: 0
CHILLS: 0

## 2018-08-07 NOTE — PROGRESS NOTES
SUBJECTIVE:   Jameel Barrett is a 78 year old male who presents to clinic today for the following health issues:  Chief Complaint   Patient presents with     Infection     x 1 week, patient has bruising and redness in left arm since hip surgery x 1 weeks ago, has changed a bit and become more red, a bit warm to the touch     Edema     x 3 days, right leg/ankle swelling getting progressively worse. Had right hip replaced last week.         Possible infection       Duration: x 1 week    Description (location/character/radiation): Patient reports has some bruising     Intensity:  moderate    Accompanying signs and symptoms: Patient reports the IV was left in patient's arm when he left the hospital and spouse removed IV.  No fevers or chills, not too much pain.     History (similar episodes/previous evaluation): None    Precipitating or alleviating factors: None    Therapies tried and outcome: None    Had acupuncture treatment on the wrists recently and had hiccups during the treatment.  They lost two needles during the procedure and he is concerned they could be in his wrist still.         Edema       Duration: x 3 days     Description (location/character/radiation): Right leg/ankle swelling    Intensity:  No pain     Accompanying signs and symptoms: as noted     History (similar episodes/previous evaluation): history of right hip replacement x 1 week ago    Precipitating or alleviating factors: at the onset patient reports when he would lay down swelling would go down, but it does not seem to be going down at all now.     Therapies tried and outcome: none     Had hip resplacement a week ago       Problem list and histories reviewed & adjusted, as indicated.  Additional history: as documented    Patient Active Problem List   Diagnosis     Hyperlipidemia LDL goal <100     Neck pain     Headache     Ptosis-episodic     Diplopia     CAD (coronary artery disease)     Anxiety     Wild-type transthyretin-related (ATTR)  "amyloidosis     Past Surgical History:   Procedure Laterality Date     CARDIAC SURGERY  4/8/08      CARDIAC SURGERY  4/12/07     ORTHOPEDIC SURGERY  10/08    LT hip replacment       Social History   Substance Use Topics     Smoking status: Never Smoker     Smokeless tobacco: Never Used     Alcohol use Yes      Comment: 1 drink daily     History reviewed. No pertinent family history.      Current Outpatient Prescriptions   Medication Sig Dispense Refill     acetaminophen (TYLENOL) 500 MG tablet Take 1,000 mg by mouth 2 times daily        ASPIRIN PO Take 325 mg by mouth 2 times daily       atorvastatin (LIPITOR) 20 MG tablet Take 10 mg by mouth daily        fluticasone (FLONASE) 50 MCG/ACT spray Spray 2 sprays in nostril daily       loratadine (CLARITIN) 10 MG capsule Take 10 mg by mouth twice a week       spironolactone (ALDACTONE) 25 MG tablet Take 12.5 mg by mouth       torsemide (DEMADEX) 5 MG tablet Take 10 mg by mouth       albuterol (PROAIR HFA/PROVENTIL HFA/VENTOLIN HFA) 108 (90 Base) MCG/ACT Inhaler Inhale 2 puffs into the lungs       aspirin 81 MG tablet Take 1 tablet by mouth daily.       carvedilol (COREG) 3.125 MG tablet Take 3.125 mg by mouth       Multiple Vitamin (MULTI-VITAMIN PO) Take  by mouth.       No Known Allergies    Reviewed and updated as needed this visit by clinical staff  Tobacco  Allergies  Med Hx  Surg Hx  Fam Hx  Soc Hx      Reviewed and updated as needed this visit by Provider         ROS:  Constitutional, MSK systems are negative, except as otherwise noted.    OBJECTIVE:     /64 (BP Location: Right arm, Patient Position: Chair, Cuff Size: Adult Regular)  Pulse 97  Temp 97.2  F (36.2  C) (Tympanic)  Resp 12  Ht 6' 4\" (1.93 m)  Wt 225 lb 3.2 oz (102.2 kg)  SpO2 98%  BMI 27.41 kg/m2  Body mass index is 27.41 kg/(m^2).  GENERAL: healthy, alert, walking slowly with cane  MS: bruising and redness on the left forearm- no foreign body noted, no increase in warmth compared to " right, mild pain  Right leg edema worse than left, no calf pain    Diagnostic Test Results:  Results for orders placed or performed in visit on 08/07/18 (from the past 24 hour(s))   D dimer quantitative   Result Value Ref Range    D Dimer 2.1 (H) 0.0 - 0.50 ug/ml FEU       ASSESSMENT/PLAN:       1. Leg edema, right    Jameel is developing worsening right leg swelling one week post-op from right hip replacement.  Taking aspirin for clot prophylaxis.  We did check a d-dimer to hopefully rule out DVT but this is elevated so U/S is warranted.  Unfortunately, we got this result back after 5pm so are not able to get outpatient U/S today.  Discussed risks of possibly untreated DVT leading to PE if we delay U/S and that I would recommend ED eval for further work-up, and he agreed.  Report called to ED charge nurse and Jameel and his wife walked down to the ED.      - D dimer quantitative    2. Left wrist pain    X-ray does show a small round opacification in the volar wrist.  I suspect this is likely chronic and unrelated to current symptoms.  He does report a history of a wrist fracture, though he doesn't recall which side this was on.  No acupuncture needles visualized.  The redness appears more consistent with bruising than infection (it is a duller red and no significantly warmer than other side or tender), so I feel that observation is appropriate.      - XR Wrist Left G/E 3 Views    Gilberto Mar MD  [facility]

## 2018-08-07 NOTE — MR AVS SNAPSHOT
After Visit Summary   8/7/2018    Jameel Barrett    MRN: [id number]           Patient Information     Date Of Birth          1940        Visit Information        Provider Department      8/7/2018 4:20 PM Gilberto Mar MD [facility]        Today's Diagnoses     Leg edema, right    -  1    Left wrist pain           Follow-ups after your visit        Your next 10 appointments already scheduled     Aug 10, 2018  1:20 PM CDT   SHORT with LEONA Cano MD   Formerly named [facility] (Formerly named [facility])    [address]  [facility] [address]   [phone]            Aug 14, 2018  8:45 AM CDT   New Visit with DELFINA Munson MD   [facility] ([facility])    [address] [facility] [address] [phone]              Who to contact     If you have questions or need follow up information about today's clinic visit or your schedule please contact [facility] directly at [phone].  Normal or non-critical lab and imaging results will be communicated to you by [employer], letter or phone within 4 business days after the clinic has received the results. If you do not hear from us within 7 days, please contact the clinic through [employer] or phone. If you have a critical or abnormal lab result, we will notify you by phone as soon as possible.  Submit refill requests through [employer] or call your pharmacy and they will forward the refill request to us. Please allow 3 business days for your refill to be completed.          Additional Information About Your Visit        [employer] Information     [employer] gives you secure access to your electronic health record. If you see a primary care provider, you can also send messages to your care team and make appointments. If you have questions, please call your primary care clinic.  If you do not have a primary care provider, please call [phone] and they will assist you.        Care  "EveryWhere ID     This is your Care EveryWhere ID. This could be used by other organizations to access your [facility] medical records  [id number]        Your Vitals Were     Pulse Temperature Respirations Height Pulse Oximetry BMI (Body Mass Index)    97 97.2  F (36.2  C) (Tympanic) 12 6' 4\" (1.93 m) 98% 27.41 kg/m2       Blood Pressure from Last 3 Encounters:   08/07/18 120/64   07/13/18 112/68   05/23/18 108/62    Weight from Last 3 Encounters:   08/07/18 225 lb 3.2 oz (102.2 kg)   07/13/18 221 lb (100.2 kg)   05/23/18 218 lb (98.9 kg)              We Performed the Following     D dimer quantitative     XR Wrist Left G/E 3 Views        Primary Care Provider    Slick Mena MD       No address on file        Equal Access to Services     [facility]: Hadii shilo jones Sotess, waaxda luqadaha, qaybta kaalmada gerri, sheldon cramer . So [facility] [phone].    ATENCIÓN: Si habla español, tiene a brown disposición servicios gratuitos de asistencia lingüística. Llame al [phone].    We comply with applicable federal civil rights laws and [place] laws. We do not discriminate on the basis of race, color, national origin, age, disability, sex, sexual orientation, or gender identity.            Thank you!     Thank you for choosing [facility]  for your care. Our goal is always to provide you with excellent care. Hearing back from our patients is one way we can continue to improve our services. Please take a few minutes to complete the written survey that you may receive in the mail after your visit with us. Thank you!             Your Updated Medication List - Protect others around you: Learn how to safely use, store and throw away your medicines at www.disposemymeds.org.          This list is accurate as of 8/7/18  5:33 PM.  Always use your most recent med list.                   Brand Name Dispense Instructions for use Diagnosis    acetaminophen 500 MG tablet "    TYLENOL     Take 1,000 mg by mouth 2 times daily        albuterol 108 (90 Base) MCG/ACT Inhaler    PROAIR HFA/PROVENTIL HFA/VENTOLIN HFA     Inhale 2 puffs into the lungs        aspirin 81 MG tablet      Take 1 tablet by mouth daily.        ASPIRIN PO      Take 325 mg by mouth 2 times daily        atorvastatin 20 MG tablet    LIPITOR     Take 10 mg by mouth daily        carvedilol 3.125 MG tablet    COREG     Take 3.125 mg by mouth        CLARITIN 10 MG capsule   Generic drug:  loratadine      Take 10 mg by mouth twice a week        fluticasone 50 MCG/ACT spray    FLONASE     Spray 2 sprays in nostril daily        MULTI-VITAMIN PO      Take  by mouth.        spironolactone 25 MG tablet    ALDACTONE     Take 12.5 mg by mouth        torsemide 5 MG tablet    DEMADEX     Take 10 mg by mouth

## 2018-08-07 NOTE — ED AVS SNAPSHOT
[facility]    [address] [address]    Phone:  [phone]    Fax:  [phone]                                       Jameel Barrett   MRN: [id number]    Department:  [facility]   Date of Visit:  8/7/2018           Patient Information     Date Of Birth          1940        Your diagnoses for this visit were:     Acute deep vein thrombosis (DVT) of femoral vein of right lower extremity (H) Start xarelto 15 mg orally twice daily.  This will need to be continue for likely 3 months.  Follow-up this week in clinic. return immed for chest pain, shortness of breath.  do not fall on blood thinners - bleeding into the brain and other serious consequences can occur.  If you fall, we should re-evaluate you again for injury.  Decrease aspirin dose to 81 mg orally daily.  this is a risk for peptic ulcer bleeding.    Postoperative state        You were seen by Julio Cesar Lamar MD.      Follow-up Information     Follow up with primary doctor In 3 days.        Follow up with [facility].    Specialty:  EMERGENCY MEDICINE    Why:  As needed, If symptoms worsen    Contact information:    [address] [place] [id number] [phone]    Additional information:    The medical center is located at   [address] [place] [address]. (between [address] and   [address] in [place], [place] miles north   of [place]).        Discharge Instructions         ICD-10-CM    1. Acute deep vein thrombosis (DVT) of femoral vein of right lower extremity (H) I82.411     Start xarelto 15 mg orally twice daily.  This will need to be continue for likely 3 months.  Follow-up this week in clinic. return immed for chest pain, shortness of breath.  do not fall on blood thinners - bleeding into the brain and other serious consequences can occur.  If you fall, we should re-evaluate you again for injury.  Decrease aspirin dose to 81 mg orally daily.  this is a  risk for peptic ulcer bleeding.   2. Postoperative state Z98.890          Understanding Venous Thromboembolism   Venous thromboembolism is when a blood clot forms in a vein. The term refers to two linked conditions: deep vein thrombosis (DVT) and pulmonary embolism. If you have DVT, a blood clot has formed in a deep vein. It often happens in a leg. Sometimes this clot can break free and travel to your lungs. Once there, it can block blood flow. This is called a pulmonary embolism. It is a health emergency.     How to say it  VEE-nu vyale-fdq-JU-boh-xochilt-[facility]   What causes venous thromboembolism?   A blood clot can form in a vein for many reasons. The cause may be partly your genes. For example, you may have a protein deficiency or a blood-clotting disorder. Your family history may also make you more prone to the problem.   Other things can also raise your chance for a blood clot in a vein. These include:    Major surgery, such as a joint replacement    Injury, such as a broken leg or damaged spinal cord    Cancer    Heart failure    Older age    Obesity    Smoking    Some medicines, such as birth control pills    Pregnancy    Long periods of not moving enough, such as after a surgery or on a long flight   Symptoms of venous thromboembolism   You may have no symptoms. But if you do, they can start quickly. Symptoms of DVT are:    Redness near the vein    Swelling    Pain    Changes in the color of the skin  A pulmonary embolism is a health emergency. It can cause trouble breathing, chest pain, coughing up of blood, and an irregular heartbeat. It may also cause sudden death.  Treatment for venous thromboembolism   The goal of treatment is to break up any blood clots and stop new ones from forming. A pulmonary embolism is then less likely to happen. Treatment includes:     Blood thinners. These medicines may be given as a shot, through an IV, or in pill form. You may need to take them for a few months or longer. It  depends on the cause of the blood clot.      Walking. Once your symptoms are under control,your healthcare provider may recommend that you walk. You may not be able to walk too far at first, such as after a surgery. But it will stop more blood clots from forming and help you feel better.    Compression socks. These socks or other similar devices can lower your risk for blood clots. They gently put pressure on the lower leg.    Inferior vena cava filter. This small metal deviceis put into your inferior vena cava. That s the main vein that runs from your legs to your heart and lungs. It catches large clots before they reach these organs. It can stop a pulmonary embolism. You may need this treatment if you aren t able to take blood thinners.    Surgery. In some cases, you may need surgery to remove a clot, especially if it has already reached the lungs.    Possible complications of venous thromboembolism    New clots    High blood pressure in the arteries to the lungs (pulmonary hypertension)    Weak valves in a vein (post-thrombotic syndrome). This can cause cramping, pain, and swelling.    Bleeding    Death  When to call your healthcare provider   Call your healthcare provider right away if you have any of these:    Fever of 100.4 F (38 C) or higher, or as directed by your healthcare provider    Redness, swelling, or fluid leaking from your incision that gets worse    Pain that gets worse    Symptoms that don t get better, or get worse    New symptoms   Date Last Reviewed: 10/1/2017    [id number] [employer]. [address] [place] [address]. All rights reserved. This information is not intended as a substitute for professional medical care. Always follow your healthcare professional's instructions.          Your next 10 appointments already scheduled     Aug 10, 2018  1:20 PM CDT   SHORT with LEONA Cano MD   [facility] ([facility])    [address]  [facility] [address]   [phone]            Aug 10, 2018  3:40 PM CDT   SHORT with Gilberto Mar MD   [facility] ([facility]    [address] [facility] [address]   [phone]            Aug 14, 2018  8:45 AM CDT   New Visit with DELFINA Munson MD   [facility] ([facility])    [address] [facility] [address]   [phone]              24 Hour Appointment Hotline       To make an appointment at any [facility], call [phone] ([phone]). If you don't have a family doctor or clinic, we will help you find one. [facility] are conveniently located to serve the needs of you and your family.             Review of your medicines      START taking        Dose / Directions Last dose taken    rivaroxaban ANTICOAGULANT 15 MG Tabs tablet   Commonly known as:  XARELTO   Dose:  15 mg   Quantity:  20 tablet        Take 1 tablet (15 mg) by mouth 2 times daily (with meals)   Refills:  0          Our records show that you are taking the medicines listed below. If these are incorrect, please call your family doctor or clinic.        Dose / Directions Last dose taken    acetaminophen 500 MG tablet   Commonly known as:  TYLENOL   Dose:  1000 mg        Take 1,000 mg by mouth 2 times daily   Refills:  0        albuterol 108 (90 Base) MCG/ACT Inhaler   Commonly known as:  PROAIR HFA/PROVENTIL HFA/VENTOLIN HFA   Dose:  2 puff        Inhale 2 puffs into the lungs every 4 hours as needed   Refills:  0        ASPIRIN PO   Dose:  325 mg        Take 325 mg by mouth 2 times daily   Refills:  0        atorvastatin 20 MG tablet   Commonly known as:  LIPITOR   Dose:  10 mg        Take 10 mg by mouth every evening   Refills:  0        CLARITIN 10 MG capsule   Dose:  10 mg   Generic drug:  loratadine        Take 10 mg by mouth every other day   Refills:  0        fluticasone 50 MCG/ACT spray   Commonly known as:  FLONASE   Dose:  2  spray        Spray 2 sprays in nostril daily   Refills:  0        MULTI-VITAMIN PO   Dose:  1 tablet        Take 1 tablet by mouth every evening   Refills:  0        spironolactone 25 MG tablet   Commonly known as:  ALDACTONE   Dose:  12.5 mg        Take 12.5 mg by mouth daily   Refills:  0        torsemide 5 MG tablet   Commonly known as:  DEMADEX   Dose:  2 mg        Take 2 mg by mouth daily   Refills:  0                Prescriptions were sent or printed at these locations (1 Prescription)                   [facility] - [place], [place] - [address]    [address] [facility] [address]    Telephone:  [phone]   Fax:  [phone]   Hours:                  E-Prescribed (1 of 1)         rivaroxaban ANTICOAGULANT (XARELTO) 15 MG TABS tablet                Procedures and tests performed during your visit     Basic metabolic panel    CBC with platelets, differential    INR    PTT    Peripheral IV catheter    US Lower Extremity Venous Duplex Right      Orders Needing Specimen Collection     None      Pending Results     Date and Time Order Name Status Description    8/7/2018 1812 US Lower Extremity Venous Duplex Right Preliminary     8/7/2018 1641 XR WRIST LEFT G/E 3 VIEWS In process             Pending Culture Results     No orders found from 8/5/2018 to 8/8/2018.            Pending Results Instructions     If you had any lab results that were not finalized at the time of your Discharge, you can call the ED Lab Result RN at [phone]. You will be contacted by this team for any positive Lab results or changes in treatment. The nurses are available 7 days a week from 10A to 6:30P.  You can leave a message 24 hours per day and they will return your call.        Test Results From Your Hospital Stay        8/7/2018  7:40 PM      Narrative     ULTRASOUND VENOUS LOWER EXTREMITY UNILATERAL RIGHT  8/7/2018 7:29 PM     HISTORY: Right leg swelling, D-dimer 2.1 in clinic, status post right  hip  replacement one week ago.    COMPARISON: None.    TECHNIQUE: Ultrasound gray scale, Color Doppler flow, and spectral  Doppler waveform analysis performed.    FINDINGS: Nonocclusive thrombus in the right common femoral vein. The  right superficial femoral, popliteal and posterior tibial veins are  patent and fully compressible and demonstrate normal venous Doppler  flow. The visualized greater saphenous vein is negative for thrombus.  For comparison the left common femoral vein was evaluated and was  unremarkable.        Impression     IMPRESSION: Positive study for deep vein thrombosis in the right  common femoral vein.         8/7/2018  6:51 PM      Component Results     Component Value Ref Range & Units Status    WBC 9.0 4.0 - 11.0 10e9/L Final    RBC Count 3.50 (L) 4.4 - 5.9 10e12/L Final    Hemoglobin 10.7 (L) 13.3 - 17.7 g/dL Final    Hematocrit 33.2 (L) 40.0 - 53.0 % Final    MCV 95 78 - 100 fl Final    MCH 30.6 26.5 - 33.0 pg Final    MCHC 32.2 31.5 - 36.5 g/dL Final    RDW 14.4 10.0 - 15.0 % Final    Platelet Count 322 150 - 450 10e9/L Final    Diff Method Automated Method  Final    % Neutrophils 66.6 % Final    % Lymphocytes 14.1 % Final    % Monocytes 10.1 % Final    % Eosinophils 4.7 % Final    % Basophils 0.3 % Final    % Immature Granulocytes 4.2 % Final    Nucleated RBCs 0 0 /100 Final    Absolute Neutrophil 6.0 1.6 - 8.3 10e9/L Final    Absolute Lymphocytes 1.3 0.8 - 5.3 10e9/L Final    Absolute Monocytes 0.9 0.0 - 1.3 10e9/L Final    Absolute Eosinophils 0.4 0.0 - 0.7 10e9/L Final    Absolute Basophils 0.0 0.0 - 0.2 10e9/L Final    Abs Immature Granulocytes 0.4 0 - 0.4 10e9/L Final    Absolute Nucleated RBC 0.0  Final         8/7/2018  7:07 PM      Component Results     Component Value Ref Range & Units Status    Sodium 141 133 - 144 mmol/L Final    Potassium 4.2 3.4 - 5.3 mmol/L Final    Chloride 107 94 - 109 mmol/L Final    Carbon Dioxide 28 20 - 32 mmol/L Final    Anion Gap 6 3 - 14 mmol/L Final     Glucose 92 70 - 99 mg/dL Final    Urea Nitrogen 22 7 - 30 mg/dL Final    Creatinine 1.10 0.66 - 1.25 mg/dL Final    GFR Estimate 64 >60 mL/min/1.7m2 Final    Non  GFR Calc    GFR Estimate If Black 78 >60 mL/min/1.7m2 Final    African American GFR Calc    Calcium 9.3 8.5 - 10.1 mg/dL Final         8/7/2018  6:58 PM      Component Results     Component Value Ref Range & Units Status    INR 1.13 0.86 - 1.14 Final         8/7/2018  6:58 PM      Component Results     Component Value Ref Range & Units Status    PTT 37 22 - 37 sec Final                Thank you for choosing [facility]       Thank you for choosing [facility] for your care. Our goal is always to provide you with excellent care. Hearing back from our patients is one way we can continue to improve our services. Please take a few minutes to complete the written survey that you may receive in the mail after you visit with us. Thank you!        [employer] Information     [employer] gives you secure access to your electronic health record. If you see a primary care provider, you can also send messages to your care team and make appointments. If you have questions, please call your primary care clinic.  If you do not have a primary care provider, please call [phone] and they will assist you.        Care EveryWhere ID     This is your Care EveryWhere ID. This could be used by other organizations to access your [facility] medical records  [id number]        Equal Access to Services     KIERAN DERAS : Hadii shilo Lezama, roselia smith, qalelia lambertay princess amuro. So [facility] [phone].    ATENCIÓN: Si habla español, tiene a brown disposición servicios gratuitos de asistencia lingüística. Lenard al [phone].    We comply with applicable federal civil rights laws and [place] laws. We do not discriminate on the basis of race, color, national origin, age, disability, sex, sexual orientation, or gender  identity.            After Visit Summary       This is your record. Keep this with you and show to your community pharmacist(s) and doctor(s) at your next visit.

## 2018-08-07 NOTE — TELEPHONE ENCOUNTER
Reason for Call:  Appointment questions    Detailed comments: patient is asking if he should wait till fridays appointment with Dr Cano or make one with someone else about brushing and redness on his left arm where the IV was placed when he had hip surgery 7/30.     Phone Number Patient can be reached at: Home number on file [phone] (home)    Best Time: any    Can we leave a detailed message on this number? YES    Call taken on 8/7/2018 at 9:42 AM by Meenu Riley

## 2018-08-07 NOTE — TELEPHONE ENCOUNTER
"S-(situation):patient calling stating he has a bruise where IV was    B-(background): Recent hip surgery/ IV placement. Recent acupuncture.    A-(assessment): He explains he also had acupuncture on the arm and the nurse \"lost two needles\". Pt states \" I am afraid that they lost the needles in my arm\". He also had recent hip surgery and had an IV placed in the same arm. Left lower arm IV was placed now, 6 inches down is red 12 inches down is bruised, patient states the bruise is 2.5 inches across.  Not swollen, not hot, denies fever.    R-(recommendations): Pt has appointment Friday, however this RN did not want to wait until then due to possible foreign body in arm due to acupuncture. Helped make appointment with Dr. Mar for today.    Lyndsay LEE RN    "

## 2018-08-07 NOTE — ED PROVIDER NOTES
History     Chief Complaint   Patient presents with     Leg Swelling     1 wk post hip replacement     HPI  Jameel Barrett is a 78 year old male who presents with a history of right hip replacement 1 week ago and over the last 2-3 days has developed right leg edema that is increased since the procedure.  This procedure was performed at [facility].  He has been on only aspirin prophylaxis.  He was seen in clinic and had a d-dimer that was elevated to 2.1 and was sent to the emergency department for additional evaluation including ultrasound.  He had presented to clinic to be evaluated for a forearm ecchymosis and fullness on the left arm as prior IV site.  A wrist x-ray had been performed and demonstrated a small calcification in the wrist it has likely been there chronically.    He does have a history of underlying coronary disease and status post PTCA in 2000 2001.  Since then he has been only on 81 mg aspirin for coronary disease.     He denies any chest pain or shortness of breath -other than one brief episode that occurred 2 nights ago when he was rolling around and moving his covers.  This lasted for 2 minutes.      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 Provider to review       Ptosis-episodic 06/08/2012     Priority: Medium     Diplopia 06/08/2012     Priority: Medium     Hyperlipidemia LDL goal <100 06/07/2012     Priority: Medium        Past Medical History:    Past Medical History:   Diagnosis Date     Coronary artery disease      Hypertension      Skin cancer        Past Surgical History:     Past Surgical History:   Procedure Laterality Date     CARDIAC SURGERY  4/8/08      CARDIAC SURGERY  4/12/07     ORTHOPEDIC SURGERY  10/08    LT hip replacment       Family History:    No family history on file.    Social History:  Marital Status:   [2]  Social History   Substance Use Topics     Smoking status: Never Smoker     Smokeless tobacco: Never Used     Alcohol use Yes      Comment: 1 drink daily        Medications:      acetaminophen (TYLENOL) 500 MG tablet   albuterol (PROAIR HFA/PROVENTIL HFA/VENTOLIN HFA) 108 (90 Base) MCG/ACT Inhaler   ASPIRIN PO   atorvastatin (LIPITOR) 20 MG tablet   fluticasone (FLONASE) 50 MCG/ACT spray   loratadine (CLARITIN) 10 MG capsule   Multiple Vitamin (MULTI-VITAMIN PO)   spironolactone (ALDACTONE) 25 MG tablet   torsemide (DEMADEX) 5 MG tablet         Review of Systems   Constitutional: Negative for chills, diaphoresis and fever.   HENT: Negative for ear pain, sinus pressure and sore throat.    Eyes: Negative for visual disturbance.   Respiratory: Negative for cough, shortness of breath and wheezing.    Cardiovascular: Positive for chest pain (brief, anterior chest 2 nights ago - lasted 2 min - only occurred while moving covers, rolling in bed) and leg swelling. Negative for palpitations.   Gastrointestinal: Negative for abdominal pain, blood in stool, constipation, diarrhea, nausea and vomiting.   Genitourinary: Negative for dysuria, frequency and urgency.   Skin: Negative for rash.   Neurological: Negative for headaches.   All other systems reviewed and are negative.      Physical Exam   BP: 119/71  Pulse: 73  Temp: 97.8  F (36.6  C)  Resp: 18  Weight: 102.1 kg (225 lb)  SpO2: 99 %      Physical Exam   Constitutional: No distress.   Eyes: Conjunctivae are normal.   Neck: Neck supple.   Cardiovascular: Normal rate and regular rhythm.  Exam reveals no gallop and no friction rub.    No murmur heard.  Pulmonary/Chest: No respiratory distress. He has no wheezes. He  has no rales.   Abdominal: He exhibits no distension. There is no tenderness. There is no rebound.   Musculoskeletal: He exhibits edema.   Neurological: He is alert. He exhibits normal muscle tone.   Skin: No rash noted. He is not diaphoretic.     There is lower extremity edema right leg without obvious ecchymosis.  This is fairly extensive edema in approximately 2-3+.  Normal distal pulses.  Normal distal sensation and motor function.  The hip itself appears to be without erythema or ecchymosis.  There is no significant tenderness.  Range of motion is surprisingly good for being 1 week out from hip replacement.      ED Course     ED Course     Procedures               Critical Care time:  none               Results for orders placed or performed during the hospital encounter of 08/07/18 (from the past 24 hour(s))   CBC with platelets, differential   Result Value Ref Range    WBC 9.0 4.0 - 11.0 10e9/L    RBC Count 3.50 (L) 4.4 - 5.9 10e12/L    Hemoglobin 10.7 (L) 13.3 - 17.7 g/dL    Hematocrit 33.2 (L) 40.0 - 53.0 %    MCV 95 78 - 100 fl    MCH 30.6 26.5 - 33.0 pg    MCHC 32.2 31.5 - 36.5 g/dL    RDW 14.4 10.0 - 15.0 %    Platelet Count 322 150 - 450 10e9/L    Diff Method Automated Method     % Neutrophils 66.6 %    % Lymphocytes 14.1 %    % Monocytes 10.1 %    % Eosinophils 4.7 %    % Basophils 0.3 %    % Immature Granulocytes 4.2 %    Nucleated RBCs 0 0 /100    Absolute Neutrophil 6.0 1.6 - 8.3 10e9/L    Absolute Lymphocytes 1.3 0.8 - 5.3 10e9/L    Absolute Monocytes 0.9 0.0 - 1.3 10e9/L    Absolute Eosinophils 0.4 0.0 - 0.7 10e9/L    Absolute Basophils 0.0 0.0 - 0.2 10e9/L    Abs Immature Granulocytes 0.4 0 - 0.4 10e9/L    Absolute Nucleated RBC 0.0    Basic metabolic panel   Result Value Ref Range    Sodium 141 133 - 144 mmol/L    Potassium 4.2 3.4 - 5.3 mmol/L    Chloride 107 94 - 109 mmol/L    Carbon Dioxide 28 20 - 32 mmol/L    Anion Gap 6 3 - 14 mmol/L    Glucose 92 70 - 99 mg/dL    Urea Nitrogen 22 7 - 30  mg/dL    Creatinine 1.10 0.66 - 1.25 mg/dL    GFR Estimate 64 >60 mL/min/1.7m2    GFR Estimate If Black 78 >60 mL/min/1.7m2    Calcium 9.3 8.5 - 10.1 mg/dL   INR   Result Value Ref Range    INR 1.13 0.86 - 1.14   PTT   Result Value Ref Range    PTT 37 22 - 37 sec   US Lower Extremity Venous Duplex Right    Narrative    ULTRASOUND VENOUS LOWER EXTREMITY UNILATERAL RIGHT  8/7/2018 7:29 PM     HISTORY: Right leg swelling, D-dimer 2.1 in clinic, status post right  hip replacement one week ago.    COMPARISON: None.    TECHNIQUE: Ultrasound gray scale, Color Doppler flow, and spectral  Doppler waveform analysis performed.    FINDINGS: Nonocclusive thrombus in the right common femoral vein. The  right superficial femoral, popliteal and posterior tibial veins are  patent and fully compressible and demonstrate normal venous Doppler  flow. The visualized greater saphenous vein is negative for thrombus.  For comparison the left common femoral vein was evaluated and was  unremarkable.      Impression    IMPRESSION: Positive study for deep vein thrombosis in the right  common femoral vein.    TAYO BUTLER MD       Medications - No data to display    Assessments & Plan (with Medical Decision Making)     MDM: Jameel Barrett is a 78 year old male who has a history of right hip replacement 1 week ago without other risk for venous thromboembolism who is been on aspirin prophylaxis since that time.  In the last couple of days he has developed unilateral edema on the affected side that had improved prior to this.  This was not erythematous.  Ultrasound was performed and demonstrates a DVT.  [facility] and I could not prescribe an extensive anticoagulant  prescription but I could give 10 days worth that will be billed to the [facility].  This will be at 15 mg twice daily and he will ultimately need to drop to 20 mg after the first 21 days.  His renal function is a GFR greater than 60.  He has no significant respiratory or  cardiopulmonary symptoms associated with this.  We discussed precautions for immediate return.  We discussed the DVT and depth as well as home management.  I have asked him to contact the [facility] to arrange for the continuation of his prescription, we have set him up with Dr. Valle in clinic for follow-up, I would also like him to contact his orthopedic surgeon as well as [facility] where he is followed for exam and white doses to let them know of his use of the rivaroxaban.    We did discuss still axis compared with warfarin and low molecular weight heparin.  He has no contraindication to the use of rivaroxaban.  We did discuss the risk that he has no reversal agent as of yet.  He needs to be very careful to prevent falls.      I have reviewed the nursing notes.    I have reviewed the findings, diagnosis, plan and need for follow up with the patient.       New Prescriptions    No medications on file       Final diagnoses:   Acute deep vein thrombosis (DVT) of femoral vein of right lower extremity (H) - Start xarelto 15 mg orally twice daily.  This will need to be continue for likely 3 months.  Follow-up this week in clinic. return immed for chest pain, shortness of breath.  do not fall on blood thinners - bleeding into the brain and other serious consequences can occur.  If you fall, we should re-evaluate you again for injury.  Decrease aspirin dose to 81 mg orally daily.  this is a risk for peptic ulcer bleeding.   Postoperative state       8/7/2018   [facility] EMERGENCY DEPARTMENT     Julio Cesar Lamar MD  08/08/18 0234

## 2018-08-07 NOTE — ED NOTES
"Pt comes in from clinic following hip replacement on the right side last week. Has had worsening edema in that leg and went to clinic to see PMD. Had blood drawn, Ddimer elevated, and sent to ED for r/o DVT. Pt denies chest pain, or shortness of breath. No hx of clots in the past. Pt hungry, excited for \" pizza\" this evening. Pt in room, oriented to room and call light. Call light within reach.  "

## 2018-08-07 NOTE — ED AVS SNAPSHOT
[facility] Emergency Department    [address] [address]    Phone:  [phone]    Fax:  [phone]                                       Jameel Barrett   MRN: [id number]    Department:  [facility] Emergency Department   Date of Visit:  8/7/2018           After Visit Summary Signature Page     I have received my discharge instructions, and my questions have been answered. I have discussed any challenges I see with this plan with the nurse or doctor.    ..........................................................................................................................................  Patient/Patient Representative Signature      ..........................................................................................................................................  Patient Representative Print Name and Relationship to Patient    ..................................................               ................................................  Date                                            Time    ..........................................................................................................................................  Reviewed by Signature/Title    ...................................................              ..............................................  Date                                                            Time

## 2018-08-08 NOTE — DISCHARGE INSTRUCTIONS
ICD-10-CM    1. Acute deep vein thrombosis (DVT) of femoral vein of right lower extremity (H) I82.411     Start xarelto 15 mg orally twice daily.  This will need to be continue for likely 3 months.  Follow-up this week in clinic. return immed for chest pain, shortness of breath.  do not fall on blood thinners - bleeding into the brain and other serious consequences can occur.  If you fall, we should re-evaluate you again for injury.  Decrease aspirin dose to 81 mg orally daily.  this is a risk for peptic ulcer bleeding.   2. Postoperative state Z98.890          Understanding Venous Thromboembolism   Venous thromboembolism is when a blood clot forms in a vein. The term refers to two linked conditions: deep vein thrombosis (DVT) and pulmonary embolism. If you have DVT, a blood clot has formed in a deep vein. It often happens in a leg. Sometimes this clot can break free and travel to your lungs. Once there, it can block blood flow. This is called a pulmonary embolism. It is a health emergency.     How to say it  VEE-nu ohwsc-yrc-EE-boh-x[facility]   What causes venous thromboembolism?   A blood clot can form in a vein for many reasons. The cause may be partly your genes. For example, you may have a protein deficiency or a blood-clotting disorder. Your family history may also make you more prone to the problem.   Other things can also raise your chance for a blood clot in a vein. These include:    Major surgery, such as a joint replacement    Injury, such as a broken leg or damaged spinal cord    Cancer    Heart failure    Older age    Obesity    Smoking    Some medicines, such as birth control pills    Pregnancy    Long periods of not moving enough, such as after a surgery or on a long flight   Symptoms of venous thromboembolism   You may have no symptoms. But if you do, they can start quickly. Symptoms of DVT are:    Redness near the vein    Swelling    Pain    Changes in the color of the skin  A pulmonary embolism is  a health emergency. It can cause trouble breathing, chest pain, coughing up of blood, and an irregular heartbeat. It may also cause sudden death.  Treatment for venous thromboembolism   The goal of treatment is to break up any blood clots and stop new ones from forming. A pulmonary embolism is then less likely to happen. Treatment includes:     Blood thinners. These medicines may be given as a shot, through an IV, or in pill form. You may need to take them for a few months or longer. It depends on the cause of the blood clot.      Walking. Once your symptoms are under control,your healthcare provider may recommend that you walk. You may not be able to walk too far at first, such as after a surgery. But it will stop more blood clots from forming and help you feel better.    Compression socks. These socks or other similar devices can lower your risk for blood clots. They gently put pressure on the lower leg.    Inferior vena cava filter. This small metal deviceis put into your inferior vena cava. That s the main vein that runs from your legs to your heart and lungs. It catches large clots before they reach these organs. It can stop a pulmonary embolism. You may need this treatment if you aren t able to take blood thinners.    Surgery. In some cases, you may need surgery to remove a clot, especially if it has already reached the lungs.    Possible complications of venous thromboembolism    New clots    High blood pressure in the arteries to the lungs (pulmonary hypertension)    Weak valves in a vein (post-thrombotic syndrome). This can cause cramping, pain, and swelling.    Bleeding    Death  When to call your healthcare provider   Call your healthcare provider right away if you have any of these:    Fever of 100.4 F (38 C) or higher, or as directed by your healthcare provider    Redness, swelling, or fluid leaking from your incision that gets worse    Pain that gets worse    Symptoms that don t get better, or get  worse    New symptoms   Date Last Reviewed: 10/1/2017    [id number] [employer] [address], [place] [address]. All rights reserved. This information is not intended as a substitute for professional medical care. Always follow your healthcare professional's instructions.

## 2018-08-10 ENCOUNTER — OFFICE VISIT (OUTPATIENT)
Dept: FAMILY MEDICINE | Facility: CLINIC | Age: 78
End: 2018-08-10
Payer: MEDICARE

## 2018-08-10 VITALS
TEMPERATURE: 98.7 F | WEIGHT: 228 LBS | OXYGEN SATURATION: 99 % | DIASTOLIC BLOOD PRESSURE: 74 MMHG | RESPIRATION RATE: 14 BRPM | BODY MASS INDEX: 27.75 KG/M2 | SYSTOLIC BLOOD PRESSURE: 112 MMHG | HEART RATE: 98 BPM

## 2018-08-10 DIAGNOSIS — I82.411 ACUTE DEEP VEIN THROMBOSIS (DVT) OF FEMORAL VEIN OF RIGHT LOWER EXTREMITY (H): Primary | ICD-10-CM

## 2018-08-10 PROCEDURE — 99213 OFFICE O/P EST LOW 20 MIN: CPT | Performed by: INTERNAL MEDICINE

## 2018-08-10 RX ORDER — OXYCODONE HYDROCHLORIDE AND ACETAMINOPHEN 325; 5 MG/5ML; MG/5ML
5 SOLUTION ORAL EVERY 6 HOURS PRN
COMMUNITY
End: 2019-05-30

## 2018-08-10 ASSESSMENT — PAIN SCALES - GENERAL: PAINLEVEL: MODERATE PAIN (4)

## 2018-08-10 NOTE — PATIENT INSTRUCTIONS
Continue the 15mg twice daily dose of Xarelto for a total of three weeks and then change to 20mg once daily.  We will continue this for a total of 3 months since your clot was brought on by your recent surgery.

## 2018-08-10 NOTE — MR AVS SNAPSHOT
After Visit Summary   8/10/2018    Jameel Barrett    MRN: [id number]           Patient Information     Date Of Birth          1940        Visit Information        Provider Department      8/10/2018 3:40 PM Gilberto Mar MD [facility]        Today's Diagnoses     Acute deep vein thrombosis (DVT) of femoral vein of right lower extremity (H)    -  1      Care Instructions    Continue the 15mg twice daily dose of Xarelto for a total of three weeks and then change to 20mg once daily.  We will continue this for a total of 3 months since your clot was brought on by your recent surgery.            Follow-ups after your visit        Your next 10 appointments already scheduled     Aug 14, 2018  8:45 AM CDT   New Visit with DELFINA Munson MD   [facility] ([facility]    [address] [address] [phone]              Who to contact     If you have questions or need follow up information about today's clinic visit or your schedule please contact [facility] directly at [phone].  Normal or non-critical lab and imaging results will be communicated to you by BigTreehart, letter or phone within 4 business days after the clinic has received the results. If you do not hear from us within 7 days, please contact the clinic through [employer] or phone. If you have a critical or abnormal lab result, we will notify you by phone as soon as possible.  Submit refill requests through [employer] or call your pharmacy and they will forward the refill request to us. Please allow 3 business days for your refill to be completed.          Additional Information About Your Visit        MyChart Information     [employer] gives you secure access to your electronic health record. If you see a primary care provider, you can also send messages to your care team and make appointments. If you have questions, please call your primary care clinic.  If you do not have  a primary care provider, please call [phone] and they will assist you.        Care EveryWhere ID     This is your Care EveryWhere ID. This could be used by other organizations to access your [place] medical records  [id number]        Your Vitals Were     Pulse Temperature Respirations Pulse Oximetry BMI (Body Mass Index)       98 98.7  F (37.1  C) (Tympanic) 14 99% 27.75 kg/m2        Blood Pressure from Last 3 Encounters:   08/10/18 112/74   08/07/18 119/71   08/07/18 120/64    Weight from Last 3 Encounters:   08/10/18 228 lb (103.4 kg)   08/07/18 225 lb (102.1 kg)   08/07/18 225 lb 3.2 oz (102.2 kg)              Today, you had the following     No orders found for display         Today's Medication Changes          These changes are accurate as of 8/10/18  4:18 PM.  If you have any questions, ask your nurse or doctor.               Start taking these medicines.        Dose/Directions    order for DME   Used for:  Acute deep vein thrombosis (DVT) of femoral vein of right lower extremity (H)   Started by:  Gilberto Mar MD        Equipment being ordered: one compression stocking 15-20mmHg   Quantity:  1 Units   Refills:  3         These medicines have changed or have updated prescriptions.        Dose/Directions    * rivaroxaban ANTICOAGULANT 15 MG Tabs tablet   Commonly known as:  XARELTO   This may have changed:  Another medication with the same name was added. Make sure you understand how and when to take each.   Used for:  Acute deep vein thrombosis (DVT) of femoral vein of right lower extremity (H)   Changed by:  Gilberto Mar MD        Dose:  15 mg   Take 1 tablet (15 mg) by mouth 2 times daily (with meals)   Quantity:  20 tablet   Refills:  0       * rivaroxaban ANTICOAGULANT 20 MG Tabs tablet   Commonly known as:  XARELTO   This may have changed:  You were already taking a medication with the same name, and this prescription was added. Make sure you understand how and when to take each.   Used for:   Acute deep vein thrombosis (DVT) of femoral vein of right lower extremity (H)   Changed by:  Gilberto Mar MD        Dose:  20 mg   Start taking on:  8/28/2018   Take 1 tablet (20 mg) by mouth daily (with dinner)   Quantity:  35 tablet   Refills:  1       * Notice:  This list has 2 medication(s) that are the same as other medications prescribed for you. Read the directions carefully, and ask your doctor or other care provider to review them with you.         Where to get your medicines      Some of these will need a paper prescription and others can be bought over the counter.  Ask your nurse if you have questions.     Bring a paper prescription for each of these medications     order for DME    rivaroxaban ANTICOAGULANT 15 MG Tabs tablet    rivaroxaban ANTICOAGULANT 20 MG Tabs tablet               Information about OPIOIDS     PRESCRIPTION OPIOIDS: WHAT YOU NEED TO KNOW   We gave you an opioid (narcotic) pain medicine. It is important to manage your pain, but opioids are not always the best choice. You should first try all the other options your care team gave you. Take this medicine for as short a time (and as few doses) as possible.    Some activities can increase your pain, such as bandage changes or therapy sessions. It may help to take your pain medicine 30 to 60 minutes before these activities. Reduce your stress by getting enough sleep, working on hobbies you enjoy and practicing relaxation or meditation. Talk to your care team about ways to manage your pain beyond prescription opioids.    These medicines have risks:    DO NOT drive when on new or higher doses of pain medicine. These medicines can affect your alertness and reaction times, and you could be arrested for driving under the influence (DUI). If you need to use opioids long-term, talk to your care team about driving.    DO NOT operate heavy machinery    DO NOT do any other dangerous activities while taking these medicines.    DO NOT drink any  alcohol while taking these medicines.     If the opioid prescribed includes acetaminophen, DO NOT take with any other medicines that contain acetaminophen. Read all labels carefully. Look for the word  acetaminophen  or  Tylenol.  Ask your pharmacist if you have questions or are unsure.    You can get addicted to pain medicines, especially if you have a history of addiction (chemical, alcohol or substance dependence). Talk to your care team about ways to reduce this risk.    All opioids tend to cause constipation. Drink plenty of water and eat foods that have a lot of fiber, such as fruits, vegetables, prune juice, apple juice and high-fiber cereal. Take a laxative (Miralax, milk of magnesia, Colace, Senna) if you don t move your bowels at least every other day. Other side effects include upset stomach, sleepiness, dizziness, throwing up, tolerance (needing more of the medicine to have the same effect), physical dependence and slowed breathing.    Store your pills in a secure place, locked if possible. We will not replace any lost or stolen medicine. If you don t finish your medicine, please throw away (dispose) as directed by your pharmacist. The [place] Pollution Control [employer] has more information about safe disposal: [email or web address]/living-green/[email or web address]-unwanted-medications         Primary Care Provider Office Phone # Fax #    R Adrian Cano [phone] [address] [address]        Equal Access to Services     KIERAN DERAS : Marcell Lezama, waaxda lumarkie, qaybta kaalsheldon villa. So [facility] [phone].    ATENCIÓN: Si habla español, tiene a brown disposición servicios gratuitos de asistencia lingüística. Lenard al [phone].    We comply with applicable federal civil rights laws and [place] laws. We do not discriminate on the basis of race, color, national origin, age, disability, sex, sexual  orientation, or gender identity.            Thank you!     Thank you for choosing [facility]  for your care. Our goal is always to provide you with excellent care. Hearing back from our patients is one way we can continue to improve our services. Please take a few minutes to complete the written survey that you may receive in the mail after your visit with us. Thank you!             Your Updated Medication List - Protect others around you: Learn how to safely use, store and throw away your medicines at www.disposemymeds.org.          This list is accurate as of 8/10/18  4:18 PM.  Always use your most recent med list.                   Brand Name Dispense Instructions for use Diagnosis    acetaminophen 500 MG tablet    TYLENOL     Take 1,000 mg by mouth 2 times daily        albuterol 108 (90 Base) MCG/ACT inhaler    PROAIR HFA/PROVENTIL HFA/VENTOLIN HFA     Inhale 2 puffs into the lungs every 4 hours as needed        ASPIRIN PO      Take 325 mg by mouth 2 times daily        atorvastatin 20 MG tablet    LIPITOR     Take 10 mg by mouth every evening        CLARITIN 10 MG capsule   Generic drug:  loratadine      Take 10 mg by mouth every other day        fluticasone 50 MCG/ACT spray    FLONASE     Spray 2 sprays in nostril daily        MULTI-VITAMIN PO      Take 1 tablet by mouth every evening        order for DME     1 Units    Equipment being ordered: one compression stocking 15-20mmHg    Acute deep vein thrombosis (DVT) of femoral vein of right lower extremity (H)       oxyCODONE-acetaminophen 5-325 MG/5ML solution    ROXICET     Take 5 mLs by mouth every 6 hours as needed for severe pain        * rivaroxaban ANTICOAGULANT 15 MG Tabs tablet    XARELTO    20 tablet    Take 1 tablet (15 mg) by mouth 2 times daily (with meals)    Acute deep vein thrombosis (DVT) of femoral vein of right lower extremity (H)       * rivaroxaban ANTICOAGULANT 20 MG Tabs tablet   Start taking on:  8/28/2018    XARELTO    35  tablet    Take 1 tablet (20 mg) by mouth daily (with dinner)    Acute deep vein thrombosis (DVT) of femoral vein of right lower extremity (H)       spironolactone 25 MG tablet    ALDACTONE     Take 12.5 mg by mouth daily        STOOL SOFTENER PO           torsemide 5 MG tablet    DEMADEX     Take 2 mg by mouth daily        * Notice:  This list has 2 medication(s) that are the same as other medications prescribed for you. Read the directions carefully, and ask your doctor or other care provider to review them with you.

## 2018-08-10 NOTE — NURSING NOTE
"Chief Complaint   Patient presents with     RECHECK     ED follow up from 8/7/18 with DVT of right lower extremity. Hip replacement on 7/30/18       Initial /74 (BP Location: Right arm, Patient Position: Chair, Cuff Size: Adult Regular)  Pulse 98  Temp 98.7  F (37.1  C) (Tympanic)  Resp 14  Wt 228 lb (103.4 kg)  SpO2 99%  BMI 27.75 kg/m2 Estimated body mass index is 27.75 kg/(m^2) as calculated from the following:    Height as of 8/7/18: 6' 4\" (1.93 m).    Weight as of this encounter: 228 lb (103.4 kg).    Medication Reconciliation: complete  Jane Puckett MA    "

## 2018-08-10 NOTE — PROGRESS NOTES
SUBJECTIVE:   Jameel Barrett is a 78 year old male who presents to clinic today for the following health issues:    Chief Complaint   Patient presents with     RECHECK     ED follow up from 8/7/18 with DVT of right lower extremity. Hip replacement on 7/30/18     ED/UC Followup:  Facility:  [facility]  Date of visit: 8/7/18  Reason for visit: Right leg swelling- DVT follow-up  Current Status: Current pain 4/10 right hip with occasional tingling in right leg. He states pain is comfortably managed with oxycodone and acetaminophen.       I saw Jameel on 8/7 for right leg swelling and his d-dimer was elevated, unfortunately it was after hours at that point, so I recommended he go to the ED for U/S, and this was positive for DVT of the right femoral vein.  He was started on Xarelto.  He was given only 10 tablets because that is how much he was able to get approved to fill [facility] since [facility].      Feeling fatigued.  Baseline SOB when stressed or getting out of shower, no worse recently.  Has some occasional dizziness.  Leg edema has not improved, he is wearing compression and trying to elevated it.  Wife is doing leg massage.      Concerned about cost of Xarelto.    Discoloration in his arm has stabilized.      Problem list and histories reviewed & adjusted, as indicated.  Additional history: as documented    Patient Active Problem List   Diagnosis     Hyperlipidemia LDL goal <100     Neck pain     Headache     Ptosis-episodic     Diplopia     CAD (coronary artery disease)     Anxiety     Wild-type transthyretin-related (ATTR) amyloidosis     Past Surgical History:   Procedure Laterality Date     CARDIAC SURGERY  4/8/08      CARDIAC SURGERY  4/12/07     ORTHOPEDIC SURGERY  10/08    LT hip replacment       Social History   Substance Use Topics     Smoking status: Never Smoker     Smokeless tobacco: Never Used     Alcohol use Yes      Comment: 1 drink daily     History reviewed. No pertinent family  history.      Current Outpatient Prescriptions   Medication Sig Dispense Refill     acetaminophen (TYLENOL) 500 MG tablet Take 1,000 mg by mouth 2 times daily        albuterol (PROAIR HFA/PROVENTIL HFA/VENTOLIN HFA) 108 (90 Base) MCG/ACT Inhaler Inhale 2 puffs into the lungs every 4 hours as needed        ASPIRIN PO Take 325 mg by mouth 2 times daily       atorvastatin (LIPITOR) 20 MG tablet Take 10 mg by mouth every evening        Docusate Calcium (STOOL SOFTENER PO)        fluticasone (FLONASE) 50 MCG/ACT spray Spray 2 sprays in nostril daily       loratadine (CLARITIN) 10 MG capsule Take 10 mg by mouth every other day        Multiple Vitamin (MULTI-VITAMIN PO) Take 1 tablet by mouth every evening        order for DME Equipment being ordered: one compression stocking 15-20mmHg 1 Units 3     oxyCODONE-acetaminophen (ROXICET) 5-325 MG/5ML solution Take 5 mLs by mouth every 6 hours as needed for severe pain       rivaroxaban ANTICOAGULANT (XARELTO) 15 MG TABS tablet Take 1 tablet (15 mg) by mouth 2 times daily (with meals) 20 tablet 0     [START ON 8/28/2018] rivaroxaban ANTICOAGULANT (XARELTO) 20 MG TABS tablet Take 1 tablet (20 mg) by mouth daily (with dinner) 35 tablet 1     spironolactone (ALDACTONE) 25 MG tablet Take 12.5 mg by mouth daily        torsemide (DEMADEX) 5 MG tablet Take 2 mg by mouth daily       [DISCONTINUED] rivaroxaban ANTICOAGULANT (XARELTO) 15 MG TABS tablet Take 1 tablet (15 mg) by mouth 2 times daily (with meals) 20 tablet 0     Allergies   Allergen Reactions     Iodine Rash     THE IODINE HE WAS GIVEN  WAS IN THE FORM OF CONTRAST DYE AND HE DEVELOPED A RASH FROM IT       Reviewed and updated as needed this visit by clinical staff  Tobacco  Med Hx  Surg Hx  Fam Hx  Soc Hx      Reviewed and updated as needed this visit by Provider         ROS:  Constitutional, cardiovascular, pulmonary, MSK systems are negative, except as otherwise noted.    OBJECTIVE:     /74 (BP Location: Right  arm, Patient Position: Chair, Cuff Size: Adult Regular)  Pulse 98  Temp 98.7  F (37.1  C) (Tympanic)  Resp 14  Wt 228 lb (103.4 kg)  SpO2 99%  BMI 27.75 kg/m2  Body mass index is 27.75 kg/(m^2).  GENERAL: healthy, alert and no distress  RESP: lungs clear to auscultation - no rales, rhonchi or wheezes  CV: regular rate and rhythm, normal S1 S2, systolic murmur present  MS: improvement in bruising on left forearm, right lower leg in compression sock with mild edema      ASSESSMENT/PLAN:       1. Acute deep vein thrombosis (DVT) of femoral vein of right lower extremity (H)    Jameel is stable since being diagnosed with DVT on 8/7.  Continues to have ongoing leg swelling, advised him it takes the body awhile to dissolve the clot and he may have swelling for awhile.  Recommended continued compression- DME order given for new stocking as he feels his current one is too tight.  Continue 3 weeks of BID Xarelto 15mg then transition to 20mg daily.  [facility] because it will otherwise be very expensive.  DVT is provoked, so plan for 3 months therapy.     - rivaroxaban ANTICOAGULANT (XARELTO) 20 MG TABS tablet; Take 1 tablet (20 mg) by mouth daily (with dinner)  Dispense: 35 tablet; Refill: 1  - rivaroxaban ANTICOAGULANT (XARELTO) 15 MG TABS tablet; Take 1 tablet (15 mg) by mouth 2 times daily (with meals)  Dispense: 20 tablet; Refill: 0  - order for DME; Equipment being ordered: one compression stocking 15-20mmHg  Dispense: 1 Units; Refill: 3    Gilberto Mar MD  [facility]

## 2018-08-14 ENCOUNTER — OFFICE VISIT (OUTPATIENT)
Dept: UROLOGY | Facility: CLINIC | Age: 78
End: 2018-08-14
Payer: MEDICARE

## 2018-08-14 VITALS
RESPIRATION RATE: 20 BRPM | DIASTOLIC BLOOD PRESSURE: 66 MMHG | WEIGHT: 228 LBS | HEIGHT: 76 IN | BODY MASS INDEX: 27.76 KG/M2 | HEART RATE: 86 BPM | SYSTOLIC BLOOD PRESSURE: 114 MMHG

## 2018-08-14 DIAGNOSIS — N47.1 PHIMOSIS: Primary | ICD-10-CM

## 2018-08-14 PROCEDURE — 99203 OFFICE O/P NEW LOW 30 MIN: CPT | Performed by: UROLOGY

## 2018-08-14 NOTE — MR AVS SNAPSHOT
"              After Visit Summary   8/14/2018    Jameel Barrett    MRN: [id number]           Patient Information     Date Of Birth          1940        Visit Information        Provider Department      8/14/2018 8:45 AM DELFINA Munson MD [facility]        Today's Diagnoses     Phimosis    -  1      Care Instructions    Per Physician's instructions            Follow-ups after your visit        Who to contact     If you have questions or need follow up information about today's clinic visit or your schedule please contact [facility] directly at [phone].  Normal or non-critical lab and imaging results will be communicated to you by [employer]ou[employer], letter or phone within 4 business days after the clinic has received the results. If you do not hear from us within 7 days, please contact the clinic through Gl[employer] or phone. If you have a critical or abnormal lab result, we will notify you by phone as soon as possible.  Submit refill requests through [employer] or call your pharmacy and they will forward the refill request to us. Please allow 3 business days for your refill to be completed.          Additional Information About Your Visit        MyChart Information     [employer] gives you secure access to your electronic health record. If you see a primary care provider, you can also send messages to your care team and make appointments. If you have questions, please call your primary care clinic.  If you do not have a primary care provider, please call [phone] and they will assist you.        Care EveryWhere ID     This is your Care EveryWhere ID. This could be used by other organizations to access your [place] medical records  [id number]        Your Vitals Were     Pulse Respirations Height BMI (Body Mass Index)          86 20 1.93 m (6' 4\") 27.75 kg/m2         Blood Pressure from Last 3 Encounters:   08/14/18 114/66   08/10/18 112/74   08/07/18 119/71    Weight from Last 3 Encounters: "   08/14/18 103.4 kg (228 lb)   08/10/18 103.4 kg (228 lb)   08/07/18 102.1 kg (225 lb)              Today, you had the following     No orders found for display       Primary Care Provider Office Phone # Fax #    R Adrian Cano [phone] [address] [facility] [address]        Equal Access to Services     KIERAN DERAS : Hadii aad ku hadasho Soomaali, waaxda luqadaha, qaybta kaalmada adeegyada, waxay idiin hayaan adeeg kharash la'aan ah. So [facility] [phone].    ATENCIÓN: Si habmarivel talley, tiene a brown disposición servicios gratuitos de asistencia lingüística. Llame al [phone].    We comply with applicable federal civil rights laws and [place] laws. We do not discriminate on the basis of race, color, national origin, age, disability, sex, sexual orientation, or gender identity.            Thank you!     Thank you for choosing [facility]  for your care. Our goal is always to provide you with excellent care. Hearing back from our patients is one way we can continue to improve our services. Please take a few minutes to complete the written survey that you may receive in the mail after your visit with us. Thank you!             Your Updated Medication List - Protect others around you: Learn how to safely use, store and throw away your medicines at www.disposemymeds.org.          This list is accurate as of 8/14/18 11:00 AM.  Always use your most recent med list.                   Brand Name Dispense Instructions for use Diagnosis    acetaminophen 500 MG tablet    TYLENOL     Take 1,000 mg by mouth 2 times daily        albuterol 108 (90 Base) MCG/ACT inhaler    PROAIR HFA/PROVENTIL HFA/VENTOLIN HFA     Inhale 2 puffs into the lungs every 4 hours as needed        ASPIRIN PO      Take 325 mg by mouth 2 times daily        atorvastatin 20 MG tablet    LIPITOR     Take 10 mg by mouth every evening        CLARITIN 10 MG capsule   Generic drug:  loratadine      Take 10 mg by  mouth every other day        fluticasone 50 MCG/ACT spray    FLONASE     Spray 2 sprays in nostril daily        MULTI-VITAMIN PO      Take 1 tablet by mouth every evening        order for DME     1 Units    Equipment being ordered: one compression stocking 15-20mmHg    Acute deep vein thrombosis (DVT) of femoral vein of right lower extremity (H)       oxyCODONE-acetaminophen 5-325 MG/5ML solution    ROXICET     Take 5 mLs by mouth every 6 hours as needed for severe pain        * rivaroxaban ANTICOAGULANT 15 MG Tabs tablet    XARELTO    20 tablet    Take 1 tablet (15 mg) by mouth 2 times daily (with meals)    Acute deep vein thrombosis (DVT) of femoral vein of right lower extremity (H)       * rivaroxaban ANTICOAGULANT 20 MG Tabs tablet   Start taking on:  8/28/2018    XARELTO    35 tablet    Take 1 tablet (20 mg) by mouth daily (with dinner)    Acute deep vein thrombosis (DVT) of femoral vein of right lower extremity (H)       spironolactone 25 MG tablet    ALDACTONE     Take 12.5 mg by mouth daily        STOOL SOFTENER PO           torsemide 5 MG tablet    DEMADEX     Take 2 mg by mouth daily        * Notice:  This list has 2 medication(s) that are the same as other medications prescribed for you. Read the directions carefully, and ask your doctor or other care provider to review them with you.

## 2018-08-14 NOTE — PROGRESS NOTES
Appointment source: New Patient  Patient name: Jameel Barrett  Urology Staff: Adrian Munson MD    Subjective: This is a 78 year old year old male complaining of foreskin phimosis    Objective:  Foreskin is retractable but uncomfortably.     Not diabetic.    Foreskin pale but minimally inflamed.    Prostate benign to palpation.    Currently anticoagulated for recent diagnosis of DVT.     Assessment:  Phimosis.    Plan:  Circumcision under local anesthesia after anticoagulant DVT therapy completed. Probably late September 2018.    Total time 30 minutes, counseling 20 minutes discussing management of phimosis.

## 2018-08-14 NOTE — NURSING NOTE
"Chief Complaint   Patient presents with     Consult     Pt requesting Circumcision        Initial /66 (BP Location: Right arm, Patient Position: Chair, Cuff Size: Adult Regular)  Pulse 86  Resp 20  Ht 1.93 m (6' 4\")  Wt 103.4 kg (228 lb)  BMI 27.75 kg/m2 Estimated body mass index is 27.75 kg/(m^2) as calculated from the following:    Height as of this encounter: 1.93 m (6' 4\").    Weight as of this encounter: 103.4 kg (228 lb).  BP completed using cuff size: regular  Medications and allergies reviewed.      Natalie WINTERS CMA     "

## 2018-09-10 ENCOUNTER — TELEPHONE (OUTPATIENT)
Dept: UROLOGY | Facility: CLINIC | Age: 78
End: 2018-09-10

## 2018-09-10 NOTE — TELEPHONE ENCOUNTER
Reason for Call:  Other surgery    Detailed comments: Pt wants to schedule surgery, wants prior to 10/05, please call    Phone Number Patient can be reached at: Home number on file [phone] (home)    Best Time: today    Can we leave a detailed message on this number? YES    Call taken on 9/10/2018 at 2:00 PM by Gilda Block

## 2018-09-12 NOTE — TELEPHONE ENCOUNTER
Spoke with the provider about this; Dr. Munson will call to discuss with pt.    Natalie WINTERS CMA

## 2018-09-13 NOTE — TELEPHONE ENCOUNTER
Spoke to Adithya Nena and suggested that he again speak to his primary care doctor. It would be moreno to follow the recommendations for his anticoagulation which are currently that he take them for about three months. That would carry the anticoagulation therapy out to the end of the year.

## 2018-09-17 ENCOUNTER — HOSPITAL ENCOUNTER (OUTPATIENT)
Dept: PHYSICAL THERAPY | Facility: CLINIC | Age: 78
Setting detail: THERAPIES SERIES
End: 2018-09-17
Attending: ORTHOPAEDIC SURGERY
Payer: MEDICARE

## 2018-09-17 PROCEDURE — 97110 THERAPEUTIC EXERCISES: CPT | Mod: GP | Performed by: PHYSICAL THERAPIST

## 2018-09-17 PROCEDURE — G8978 MOBILITY CURRENT STATUS: HCPCS | Mod: GP,CK | Performed by: PHYSICAL THERAPIST

## 2018-09-17 PROCEDURE — 40000718 ZZHC STATISTIC PT DEPARTMENT ORTHO VISIT: Performed by: PHYSICAL THERAPIST

## 2018-09-17 PROCEDURE — 97162 PT EVAL MOD COMPLEX 30 MIN: CPT | Mod: GP | Performed by: PHYSICAL THERAPIST

## 2018-09-17 PROCEDURE — G8979 MOBILITY GOAL STATUS: HCPCS | Mod: GP,CJ | Performed by: PHYSICAL THERAPIST

## 2018-09-17 NOTE — PROGRESS NOTES
09/17/18 1400   General Information   Type of Visit Initial OP Ortho PT Evaluation   Start of Care Date 09/17/18   Referring Physician Teodoro Sanz    Patient/Family Goals Statement Get stronger, better stride.    Orders Evaluate and Treat   Orders Comment ROM, Stretch, Strengthen, Gait.    Date of Order 09/13/18   Insurance Type Medicare;Other  ([employer] )   Insurance Comments/Visits Authorized Auth'd $[id number] left.    Medical Diagnosis R SO  7/30/18.    Surgical/Medical history reviewed (L SO Oct 2008, Heart problems, Skin CA )   Precautions/Limitations right hip precautions   Weight-Bearing Status - RLE weight-bearing as tolerated   Special Instructions Leaving for [place] on Oct 8th.    General Information Comments On blood thinner d/t DVT,    Body Part(s)   Body Part(s) Hip   Presentation and Etiology   Pertinent history of current problem (include personal factors and/or comorbidities that impact the POC) Was using a walker, then went to cane, and discontinued the cane about 2 weeks ago. Surgery 7/30/18 for R SO, found DVT found on 8/7/18 and was put on a blood thinner. Was in hospital x 2 days, went home, has ex's from hospital.    Impairments A. Pain;B. Decreased WB tolerance;F. Decreased strength and endurance;G. Impaired balance;H. Impaired gait;J. Burning   Functional Limitations perform required work activities;perform desired leisure / sports activities   Symptom Location P in R hip when sitting. Loss of strength.    How/Where did it occur From Degenerative Joint Disease   Onset date of current episode/exacerbation 07/30/18  (Surgical Date. )   Chronicity New   Pain rating (0-10 point scale) (0-3/10 )   Pain quality B. Dull   Frequency of pain/symptoms B. Intermittent   Pain/symptoms are: Worse in the morning  (And afternoon about 4 or 5. )   Pain/symptoms exacerbated by A. Sitting;B. Walking;I. Bending;J. ADL;K. Home tasks   Pain/symptoms eased by C. Rest;E. Changing positions;I. OTC medication(s)    Progression of symptoms since onset: Improved   Current / Previous Interventions   X-ray results Last X'Ray looked normal, no change in SO status.    Prior Level of Function   Functional Level Prior Comment Currently independent w/ ADL's.    Current Level of Function   Current Community Support Family/friend caregiver   Patient role/employment history F. Retired  (Constructor for building homes. )   [facility]   Home/community accessibility Has stairs to the basement. Railings on 1 side.    Current equipment-Gait/Locomotion Front wheeled walker;Standard cane   Fall Risk Screen   Fall screen completed by PT   Have you fallen 2 or more times in the past year? Yes   Have you fallen and had an injury in the past year? No   Timed Up and Go score (seconds) 12   Is patient a fall risk? No   System Outcome Measures   Outcome Measures (LEFS  39/80)   Functional Scales   Functional Scales OPTIMAL   Optimal (1=able to do without difficulty, 2=able to do with little difficulty, 3=able to do with moderate difficulty, 4=able to do with much difficulty, 5=unable to do, 9=NA) Activity 1;Activity 2;Activity 3   Activity 1 comment Pt able to walk 2 blocks w/ moderate difficulty.    Activity 2 comment Moderate difficulty w/doing stairs.    Activity 3 comment Moderate difficulty w/ getting in/out of car.    Hip Objective Findings   Observation No acute distress   Integumentary  Atrophy of R Calf and Quad.    Posture Head forward    Gait/Locomotion Antalgic on R, P near new hip, Stairs weakness of R Hip Abd.    Hip ROM Comments Hip Precautions.    Hip/Knee Strength Comments Hip Flex L 5-, R 4, Knee Ext R 4, hip Abd R 4, L 5-, Knee Flex R 5-, DF R 4+   Hip Flexibility Comments HS's -80 B, R>L Gastroc Significantly tight.    Planned Therapy Interventions   Planned Therapy Interventions Comment Develop HEP, Work on improving gait pattern.    Planned Modality Interventions   Planned Modality Interventions Comments  Only if indicated.    Clinical Impression   Criteria for Skilled Therapeutic Interventions Met yes, treatment indicated   PT Diagnosis Limited mobility d/t recent R SO. Hx of L SO 10 years ago.    Influenced by the following impairments P, Weakness, P w/ WBing on R, Impaired balance and gait.    Functional limitations due to impairments ADL's more difficulty, Mobility in the community, Safe ambulation.    Clinical Presentation Evolving/Changing   Clinical Presentation Rationale LEFS, Hx of L SO, Skin CA, Hx of Heart problems, Very tight Flexibility around L/E's. MMT   Clinical Decision Making (Complexity) Moderate complexity   Therapy Frequency 2 times/Week   Predicted Duration of Therapy Intervention (days/wks) Through Oct 8th and then patient leaves for [place]. 7 visits.    Risk & Benefits of therapy have been explained Yes   Patient, Family & other staff in agreement with plan of care Yes   Clinical Impression Comments Limited mobility d/t recent R SO. Hx of L SO 10 years ago.    Education Assessment   Preferred Learning Style Listening;Demonstration;Pictures/video   Barriers to Learning No barriers   ORTHO GOALS   PT Ortho Eval Goals 1;2;3;4   Ortho Goal 1   Goal Identifier 1   Goal Description STG: Pt will be able to go up or down stairs reciprocally and w/ minimal difficulty.    Target Date 10/08/18   Ortho Goal 2   Goal Identifier 2   Goal Description STG: Pt will be able to get in/out of car w/ minimal difficulty.    Target Date 10/08/18   Ortho Goal 3   Goal Identifier 3   Goal Description STG: Pt will be able to walk 2 blocks w/o difficulty.    Target Date 10/08/18   Ortho Goal 4   Goal Identifier 4    Goal Description LTG: Pt will have a HEP to begin strengthening, ROM, Gait improvement, Balance drills before leaving for [place].    Target Date 10/08/18   Total Evaluation Time   Total Evaluation Time 50 Total (Eval x 25, Ex x 25)    Therapy Certification   Certification date from 09/17/18    Certification date to 10/08/18   Medical Diagnosis s/p R SO  (7/30/18)    Jeana Guthrie PT, [facility] (#[id number])  [facility]           [phone]  Fax          [phone]  Appt #      [phone]

## 2018-09-17 NOTE — PROGRESS NOTES
[facility]    OUTPATIENT PHYSICAL THERAPY ORTHOPEDIC EVALUATION  PLAN OF TREATMENT FOR OUTPATIENT REHABILITATION  (COMPLETE FOR INITIAL CLAIMS ONLY)  Patient's Last Name, First Name, M.I.  YOB: 1940  Jameel Barrett       Provider s Name:  [facility]   Medical Record No.  [id number]   Start of Care Date:  09/17/18   Onset Date:  07/30/18 (Surgical Date. )   Type:     _X__PT   ___OT   ___SLP Medical Diagnosis:  s/p R SO  (7/30/18)      PT Diagnosis:  Limited mobility d/t recent R SO. Hx of L SO 10 years ago.    Visits from SOC:  1      _________________________________________________________________________________  Plan of Treatment/Functional Goals:     Develop HEP, Work on improving gait pattern.      Only if indicated.   Goals  Goal Identifier: 1  Goal Description: STG: Pt will be able to go up or down stairs reciprocally and w/ minimal difficulty.   Target Date: 10/08/18    Goal Identifier: 2  Goal Description: STG: Pt will be able to get in/out of car w/ minimal difficulty.   Target Date: 10/08/18    Goal Identifier: 3  Goal Description: STG: Pt will be able to walk 2 blocks w/o difficulty.   Target Date: 10/08/18    Goal Identifier: 4   Goal Description: LTG: Pt will have a HEP to begin strengthening, ROM, Gait improvement, Balance drills before leaving for [place].   Target Date: 10/08/18    Therapy Frequency:  2 times/Week  Predicted Duration of Therapy Intervention:  Through Oct 8th and then patient leaves for [place]. 7 visits.     Jeana Guthrie, PT, [facility]                  I CERTIFY THE NEED FOR THESE SERVICES FURNISHED UNDER        THIS PLAN OF TREATMENT AND WHILE UNDER MY CARE     (Physician co-signature of this document indicates review and certification of the therapy plan).                     Certification Date From:  09/17/18   Certification Date To:  10/08/18    Referring Provider:  Teodoro Sanz     Initial Assessment        See Epic  Evaluation Start of Care Date: 09/17/18

## 2018-09-20 ENCOUNTER — HOSPITAL ENCOUNTER (OUTPATIENT)
Dept: PHYSICAL THERAPY | Facility: CLINIC | Age: 78
Setting detail: THERAPIES SERIES
End: 2018-09-20
Attending: ORTHOPAEDIC SURGERY
Payer: MEDICARE

## 2018-09-20 PROCEDURE — 97110 THERAPEUTIC EXERCISES: CPT | Mod: GP | Performed by: PHYSICAL THERAPIST

## 2018-09-20 PROCEDURE — 40000718 ZZHC STATISTIC PT DEPARTMENT ORTHO VISIT: Performed by: PHYSICAL THERAPIST

## 2018-09-27 ENCOUNTER — HOSPITAL ENCOUNTER (OUTPATIENT)
Dept: PHYSICAL THERAPY | Facility: CLINIC | Age: 78
Setting detail: THERAPIES SERIES
End: 2018-09-27
Attending: ORTHOPAEDIC SURGERY
Payer: MEDICARE

## 2018-09-27 PROCEDURE — G8980 MOBILITY D/C STATUS: HCPCS | Mod: GP,CJ | Performed by: PHYSICAL THERAPIST

## 2018-09-27 PROCEDURE — G8979 MOBILITY GOAL STATUS: HCPCS | Mod: GP,CJ | Performed by: PHYSICAL THERAPIST

## 2018-09-27 PROCEDURE — 40000718 ZZHC STATISTIC PT DEPARTMENT ORTHO VISIT: Performed by: PHYSICAL THERAPIST

## 2018-09-27 PROCEDURE — 97110 THERAPEUTIC EXERCISES: CPT | Mod: GP | Performed by: PHYSICAL THERAPIST

## 2018-09-27 NOTE — PROGRESS NOTES
Outpatient Physical Therapy Discharge Note     Patient: Jameel Barrett  : 1940    Beginning/End Dates of Reporting Period:  18 to 2018.  Total # of Rx sessions: 3    Referring Provider: Teodoro Sanz     Therapy Diagnosis: s/p R SO  18     Client Self Report: Leaving for [place] soon, so this will be his last appt in [place].  Biggest complaint is that he gets N/T into R Leg when laying flat.     Objective Measurements:  Objective Measure: LEFS   Details: 18  Score 58/80.     INITIALlY:  39/80     Objective Measure: MMT  Details: 18  hip flex R 4+,Abd 5-, knee Ext 5-, Knee Flex and DF 5.    INITIALLY: Hip Flex L 5-, R 4, Knee Ext R 4, hip Abd R 4, L 5-, Knee Flex R 5-, DF R 4+    Objective Measure: FLexibility   Details: INITIALLY: HS's -80 B in 90/90 position, Gas tight B R>L.      Goals:  Goal Identifier 1   Goal Description STG: Pt will be able to go up or down stairs reciprocally and w/ minimal difficulty.  18 MET    Target Date 10/08/18   Date Met  18   Progress:     Goal Identifier 2   Goal Description STG: Pt will be able to get in/out of car w/ minimal difficulty.  18  MET    Target Date 10/08/18   Date Met  18   Progress:     Goal Identifier 3   Goal Description STG: Pt will be able to walk 2 blocks w/o difficulty.  18  Minimal Difficulty.    Target Date 10/08/18   Date Met      Progress:     Goal Identifier 4    Goal Description LTG: Pt will have a HEP to begin strengthening, ROM, Gait improvement, Balance drills before leaving for [place].  18  MET    Target Date 10/08/18   Date Met  18   Progress:     Progress Toward Goals:   Progress this reporting period: Pt has met 3/4 Goals.   Progress limited due to Still some difficulty w/ walking.     Plan:  Discharge from therapy.    Discharge:  Yes     Reason for Discharge: Patient chooses to discontinue therapy.  Pt leaving for [place] and will seek PT in [place].     Equipment Issued: Has  Walker, Cane, etc.     Discharge Plan: Patient to continue home program.  Pt to follow up w/MD as appropriate.   Jeana Guthrie, PT, [facility] (#[id number])  [facility]           [phone]  Fax          [phone]  Appt #      [phone]

## 2019-02-18 ENCOUNTER — APPOINTMENT (RX ONLY)
Dept: URBAN - METROPOLITAN AREA CLINIC 114 | Facility: CLINIC | Age: 79
Setting detail: DERMATOLOGY
End: 2019-02-18

## 2019-02-18 DIAGNOSIS — L82.1 OTHER SEBORRHEIC KERATOSIS: ICD-10-CM

## 2019-02-18 DIAGNOSIS — L57.0 ACTINIC KERATOSIS: ICD-10-CM

## 2019-02-18 DIAGNOSIS — Z85.828 PERSONAL HISTORY OF OTHER MALIGNANT NEOPLASM OF SKIN: ICD-10-CM

## 2019-02-18 DIAGNOSIS — D22 MELANOCYTIC NEVI: ICD-10-CM

## 2019-02-18 DIAGNOSIS — D18.0 HEMANGIOMA: ICD-10-CM

## 2019-02-18 DIAGNOSIS — L70.8 OTHER ACNE: ICD-10-CM

## 2019-02-18 DIAGNOSIS — L82.0 INFLAMED SEBORRHEIC KERATOSIS: ICD-10-CM

## 2019-02-18 PROBLEM — D22.5 MELANOCYTIC NEVI OF TRUNK: Status: ACTIVE | Noted: 2019-02-18

## 2019-02-18 PROBLEM — L55.1 SUNBURN OF SECOND DEGREE: Status: ACTIVE | Noted: 2019-02-18

## 2019-02-18 PROBLEM — L23.7 ALLERGIC CONTACT DERMATITIS DUE TO PLANTS, EXCEPT FOOD: Status: ACTIVE | Noted: 2019-02-18

## 2019-02-18 PROBLEM — D18.01 HEMANGIOMA OF SKIN AND SUBCUTANEOUS TISSUE: Status: ACTIVE | Noted: 2019-02-18

## 2019-02-18 PROBLEM — D22.39 MELANOCYTIC NEVI OF OTHER PARTS OF FACE: Status: ACTIVE | Noted: 2019-02-18

## 2019-02-18 PROCEDURE — 17000 DESTRUCT PREMALG LESION: CPT | Mod: 59

## 2019-02-18 PROCEDURE — 17003 DESTRUCT PREMALG LES 2-14: CPT

## 2019-02-18 PROCEDURE — ? RECOMMENDATIONS

## 2019-02-18 PROCEDURE — ? LIQUID NITROGEN

## 2019-02-18 PROCEDURE — 99202 OFFICE O/P NEW SF 15 MIN: CPT | Mod: 25

## 2019-02-18 PROCEDURE — ? COUNSELING

## 2019-02-18 PROCEDURE — 17110 DESTRUCTION B9 LES UP TO 14: CPT

## 2019-02-18 ASSESSMENT — LOCATION ZONE DERM
LOCATION ZONE: NECK
LOCATION ZONE: EAR
LOCATION ZONE: FACE
LOCATION ZONE: NOSE
LOCATION ZONE: TRUNK

## 2019-02-18 ASSESSMENT — LOCATION DETAILED DESCRIPTION DERM
LOCATION DETAILED: LEFT ANTIHELIX
LOCATION DETAILED: RIGHT NASAL SIDEWALL
LOCATION DETAILED: LEFT MID-UPPER BACK
LOCATION DETAILED: RIGHT INFERIOR MEDIAL UPPER BACK
LOCATION DETAILED: LEFT SUPERIOR LATERAL NECK
LOCATION DETAILED: LEFT MID TEMPLE
LOCATION DETAILED: RIGHT MID-UPPER BACK
LOCATION DETAILED: LEFT SUPERIOR MEDIAL MIDBACK
LOCATION DETAILED: LEFT MID PREAURICULAR CHEEK
LOCATION DETAILED: PERIUMBILICAL SKIN
LOCATION DETAILED: RIGHT MEDIAL UPPER BACK
LOCATION DETAILED: RIGHT INFERIOR LATERAL MIDBACK
LOCATION DETAILED: RIGHT CENTRAL MALAR CHEEK
LOCATION DETAILED: RIGHT SUPERIOR MEDIAL MIDBACK
LOCATION DETAILED: EPIGASTRIC SKIN

## 2019-02-18 ASSESSMENT — LOCATION SIMPLE DESCRIPTION DERM
LOCATION SIMPLE: LEFT EAR
LOCATION SIMPLE: LEFT LOWER BACK
LOCATION SIMPLE: LEFT TEMPLE
LOCATION SIMPLE: ABDOMEN
LOCATION SIMPLE: RIGHT NOSE
LOCATION SIMPLE: LEFT UPPER BACK
LOCATION SIMPLE: RIGHT LOWER BACK
LOCATION SIMPLE: RIGHT CHEEK
LOCATION SIMPLE: NECK
LOCATION SIMPLE: LEFT CHEEK
LOCATION SIMPLE: RIGHT UPPER BACK

## 2019-02-18 NOTE — PROCEDURE: RECOMMENDATIONS
Detail Level: Detailed
Recommendation Preamble: The following recommendations were made during the visit: sarna lotion nightly

## 2019-02-18 NOTE — PROCEDURE: LIQUID NITROGEN
Consent: The patient's consent was obtained including but not limited to risks of crusting, scabbing, blistering, scarring, darker or lighter pigmentary change, recurrence, incomplete removal and infection.
Detail Level: Detailed
Duration Of Freeze Thaw-Cycle (Seconds): 1
Post-Care Instructions: I reviewed with the patient in detail post-care instructions. Patient is to wear sunprotection, and avoid picking at any of the treated lesions. Pt may apply Vaseline to crusted or scabbing areas.
Number Of Freeze-Thaw Cycles: 2 freeze-thaw cycles
Render Post-Care Instructions In Note?: yes
Number Of Freeze-Thaw Cycles: 5 freeze-thaw cycles
Medical Necessity Information: It is in your best interest to select a reason for this procedure from the list below. All of these items fulfill various CMS LCD requirements except the new and changing color options.
Add 52 Modifier (Optional): no
Medical Necessity Clause: This procedure was medically necessary because the lesions that were treated were: painful and irritated by clothing.

## 2019-05-02 ENCOUNTER — APPOINTMENT (RX ONLY)
Dept: URBAN - METROPOLITAN AREA CLINIC 114 | Facility: CLINIC | Age: 79
Setting detail: DERMATOLOGY
End: 2019-05-02

## 2019-05-02 DIAGNOSIS — L57.0 ACTINIC KERATOSIS: ICD-10-CM

## 2019-05-02 DIAGNOSIS — R23.3 SPONTANEOUS ECCHYMOSES: ICD-10-CM

## 2019-05-02 DIAGNOSIS — D22 MELANOCYTIC NEVI: ICD-10-CM

## 2019-05-02 PROBLEM — D22.5 MELANOCYTIC NEVI OF TRUNK: Status: ACTIVE | Noted: 2019-05-02

## 2019-05-02 PROCEDURE — 17000 DESTRUCT PREMALG LESION: CPT

## 2019-05-02 PROCEDURE — ? LIQUID NITROGEN

## 2019-05-02 PROCEDURE — 99214 OFFICE O/P EST MOD 30 MIN: CPT | Mod: 25

## 2019-05-02 PROCEDURE — ? COUNSELING

## 2019-05-02 PROCEDURE — ? TREATMENT REGIMEN

## 2019-05-02 PROCEDURE — 17003 DESTRUCT PREMALG LES 2-14: CPT

## 2019-05-02 PROCEDURE — ? OBSERVATION AND MEASURE

## 2019-05-02 ASSESSMENT — LOCATION SIMPLE DESCRIPTION DERM
LOCATION SIMPLE: RIGHT FOREARM
LOCATION SIMPLE: CHEST

## 2019-05-02 ASSESSMENT — LOCATION DETAILED DESCRIPTION DERM
LOCATION DETAILED: RIGHT PROXIMAL DORSAL FOREARM
LOCATION DETAILED: RIGHT DISTAL RADIAL DORSAL FOREARM
LOCATION DETAILED: RIGHT MEDIAL INFERIOR CHEST

## 2019-05-02 ASSESSMENT — LOCATION ZONE DERM
LOCATION ZONE: ARM
LOCATION ZONE: TRUNK

## 2019-05-02 NOTE — PROCEDURE: LIQUID NITROGEN
Consent: The patient's consent was obtained including but not limited to risks of crusting, scabbing, blistering, scarring, darker or lighter pigmentary change, recurrence, incomplete removal and infection.
Detail Level: Simple
Render Post-Care Instructions In Note?: no
Duration Of Freeze Thaw-Cycle (Seconds): 2
Post-Care Instructions: I reviewed with the patient in detail post-care instructions. Patient is to wear sunprotection, and avoid picking at any of the treated lesions. Pt may apply Vaseline to crusted or scabbing areas.
Number Of Freeze-Thaw Cycles: 3 freeze-thaw cycles

## 2019-05-02 NOTE — PROCEDURE: MIPS QUALITY
Quality 474: Zoster Vaccination Status: Shingrix Vaccination Administered or Previously Received
Additional Notes: Patient states on a scale 0-10 they report a pain level of 0
Detail Level: Detailed
Quality 131: Pain Assessment And Follow-Up: Pain assessment using a standardized tool is documented as negative, no follow-up plan required
Quality 130: Documentation Of Current Medications In The Medical Record: Current Medications Documented

## 2019-05-28 NOTE — PROGRESS NOTES
CARDIOLOGY VISIT    REASON FOR VISIT: cardiac amyloid, CAD, aortic stenosis    SUBJECTIVE:  79-year-old male seen for evaluation of coronary artery disease, cardiac amyloid, and aortic stenosis.       He underwent stenting of the proximal LAD in 2007 and second LAD stent placed in 2008.      Echo May 2016 from outside [facility] showed ejection fraction 60%, mild AI, moderate AS (mean 25mmHg, Vmax 3.1m/s, DI 0.36, AMBER 1.0cm2).      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  This was proven by cardiac biopsy at the [facility].   Hemodynamic assessment of the aortic valve showed moderate stenosis with mean gradient 31, valve area 1.87 m .  RH cath showed RA 3mmHg, RV 37/3, PA 31/11, LVEDP 16, CI 3.4. Echo July 2016 at [place] showed EF 61%, no WMA, severe LVH (1.7cm), normal RV, mild LAE, moderate AS (mean 29mmHg, Vmax 3.3m/s, AMBER 1.4cm2, DI 0.29), mild AI.     At the [facility] in 2016 he was primarily treated for his ongoing angina.  He had been on a higher dose of diltiazem (180mg) which he did not tolerate because of fatigue.  He had been on atenolol and L-arginine.      He underwent further evaluation at the [facility] in May 2018.  Coronary angiogram showed mild to moderate coronary disease, greatest lesion 60 to 70% RCA stenosis, moderate aortic stenosis with a mean of 29 mmHg and valve area between 1.1- 1.5 cm , cardiac index 2.1, pulmonary hypertension of 65/22, wedge of 27, EF of 35%.    In November 2018 he enrolled in a trial for tafamadis.  He has been on the drug since then and feels a little better.  He spends most of his time in [place].  He is able to walk 1/2 mile at a casual pace, stopping once.  He denies any chest pain or anginal symptoms.  Weight is stable at 225 pounds with no lower extremity edema.  He has no lightheadedness or syncope.    He just followed up with the [facility] a few weeks ago.  The clinical trial for tafamadis will  probably be ending within 90 days.  He is hoping to continue this drug.  The issue of replacing his aortic valve has now come up, as this could not be done in the study.  Recent 6-minute walk test at the [facility] showed 1230 feet, which is 89% of age-predicted max.    MEDICATIONS:  Current Outpatient Medications   Medication     acetaminophen (TYLENOL) 500 MG tablet     albuterol (PROAIR HFA/PROVENTIL HFA/VENTOLIN HFA) 108 (90 Base) MCG/ACT Inhaler     ASPIRIN PO     atorvastatin (LIPITOR) 20 MG tablet     Docusate Calcium (STOOL SOFTENER PO)     fluticasone (FLONASE) 50 MCG/ACT spray     loratadine (CLARITIN) 10 MG capsule     Multiple Vitamin (MULTI-VITAMIN PO)     order for DME     oxyCODONE-acetaminophen (ROXICET) 5-325 MG/5ML solution     rivaroxaban ANTICOAGULANT (XARELTO) 15 MG TABS tablet     rivaroxaban ANTICOAGULANT (XARELTO) 20 MG TABS tablet     spironolactone (ALDACTONE) 25 MG tablet     torsemide (DEMADEX) 5 MG tablet     No current facility-administered medications for this visit.        ALLERGIES:  Allergies   Allergen Reactions     Iodine Rash     THE IODINE HE WAS GIVEN  WAS IN THE FORM OF CONTRAST DYE AND HE DEVELOPED A RASH FROM IT       REVIEW OF SYSTEMS:  Constitutional:  No weight loss, fever, chills, weakness or fatigue.  HEENT:  Eyes:  No visual loss, blurred vision, double vision or yellow sclerae. No hearing loss, sneezing, congestion, runny nose or sore throat.  Skin:  No rash or itching.  Cardiovascular: per HPI  Respiratory: per HPI  GI:  No anorexia, nausea, vomiting or diarrhea. No abdominal pain or blood.  :  No dysurea, hematuria  Neurologic:  No headache, dizziness, syncope, paralysis, ataxia, numbness or tingling in the extremities. No change in bowel or bladder control.  Musculoskeletal:  No muscle, back pain, joint pain or stiffness.  Hematologic:  No anemia, bleeding or bruising.  Lymphatics:  No enlarged nodes. No history of splenectomy.  Psychiatric:  No history of  depression or anxiety.  Endocrine:  No reports of sweating, cold or heat intolerance. No polyuria or polydipsia.  Allergies:  No history of asthma, hives, eczema or rhinitis.    PHYSICAL EXAM:  /72   Pulse 67   Wt 103.9 kg (229 lb)   SpO2 98%   BMI 27.87 kg/m    Constitutional: awake, alert, no distress  Eyes: PERRL, sclera nonicteric  ENT: trachea midline  Respiratory: l regular rate and rhythm,ungs clear  Cardiovascular: 2/6 mid to late peaking systolic murmur at the upper sternal border, no lower extremity edema  GI: nondistended, nontender, bowel sounds present  Lymph/Hematologic: no lymphadenopathy  Skin: dry, no rash  Musculoskeletal: good muscle tone, strength 5/5 in upper and lower extremities  Neurologic: no focal deficits  Neuropsychiatric: appropriate affact    DATA:  Lab: August 2018: Potassium 4.2, creatinine 1.1, hemoglobin 10.7     EKG: May 30, 2019: Sinus rhythm, rate 67, normal axis and intervals    ASSESSMENT:  79-year-old male seen for follow-up of coronary disease, aortic stenosis, and cardiac amyloid.  Clinically he is actually doing okay.  He developed some dyspnea when he walks at a faster pace, but has no angina or heart failure symptoms.  His cardiac amyloid is being managed through the [facility], he is hoping to continue tafamadis when the trial ends.    His aortic valve is probably in the advanced moderate, possibly early severe range.  He is due for an echocardiogram.  If he is no longer in the amyloid trial, he could undergo aortic valve replacement.  Hopefully TAVR would be feasible in him, as he would be somewhat higher risk for sternotomy and surgical valve replacement.  He is agreeable to doing an echo locally and if needed, being seen in structural heart clinic.  He does have a follow-up in August with cardiology at the [facility].    RECOMMENDATIONS:  1.  Aortic stenosis  -Echocardiogram, if aortic stenosis is approaching the severe range, referral to the structural  heart clinic to discuss valve replacement    2.  Cardiac amyloidosis, mild type transthyretin  - Managed through the [facility], continue tafamadis per study protocol  - Fluid status seems stable, continue current diuretics    3. Coronary artery disease  -Stable, may need repeat angiogram if he were to undergo valve replacement to assess the RCA lesion that was 60 to 70% last year    Follow-up with general cardiology will be determined based on results of echo and plan for timing of aortic valve surgery.    Justice Graff MD  Cardiology - [facility]  Pager:  [phone]  Text Page  May 28, 2019

## 2019-05-30 ENCOUNTER — OFFICE VISIT (OUTPATIENT)
Dept: CARDIOLOGY | Facility: CLINIC | Age: 79
End: 2019-05-30
Attending: INTERNAL MEDICINE
Payer: MEDICARE

## 2019-05-30 ENCOUNTER — HOSPITAL ENCOUNTER (OUTPATIENT)
Dept: CARDIOLOGY | Facility: CLINIC | Age: 79
Discharge: HOME | End: 2019-05-30
Attending: INTERNAL MEDICINE | Admitting: INTERNAL MEDICINE
Payer: MEDICARE

## 2019-05-30 VITALS
BODY MASS INDEX: 27.87 KG/M2 | SYSTOLIC BLOOD PRESSURE: 128 MMHG | OXYGEN SATURATION: 98 % | HEART RATE: 67 BPM | DIASTOLIC BLOOD PRESSURE: 72 MMHG | WEIGHT: 229 LBS

## 2019-05-30 DIAGNOSIS — I43 CARDIAC AMYLOIDOSIS (H): Primary | ICD-10-CM

## 2019-05-30 DIAGNOSIS — I25.10 CORONARY ARTERY DISEASE INVOLVING NATIVE CORONARY ARTERY WITHOUT ANGINA PECTORIS, UNSPECIFIED WHETHER NATIVE OR TRANSPLANTED HEART: ICD-10-CM

## 2019-05-30 DIAGNOSIS — E85.4 CARDIAC AMYLOIDOSIS (H): Primary | ICD-10-CM

## 2019-05-30 DIAGNOSIS — I35.0 NONRHEUMATIC AORTIC VALVE STENOSIS: ICD-10-CM

## 2019-05-30 DIAGNOSIS — I25.10 CORONARY ARTERY DISEASE INVOLVING NATIVE CORONARY ARTERY WITHOUT ANGINA PECTORIS, UNSPECIFIED WHETHER NATIVE OR TRANSPLANTED HEART: Primary | ICD-10-CM

## 2019-05-30 PROCEDURE — 99214 OFFICE O/P EST MOD 30 MIN: CPT | Performed by: INTERNAL MEDICINE

## 2019-05-30 PROCEDURE — 93005 ELECTROCARDIOGRAM TRACING: CPT

## 2019-05-30 PROCEDURE — 93010 ELECTROCARDIOGRAM REPORT: CPT | Performed by: INTERNAL MEDICINE

## 2019-05-30 RX ORDER — BUMETANIDE 0.5 MG/1
0.5 TABLET ORAL DAILY
COMMUNITY
End: 2020-07-23

## 2019-05-30 NOTE — PATIENT INSTRUCTIONS
"  [facility] HEART CARE  [facility]~[address]~[place], [place]~[address]  Thank you for your [facility] visit today. If you have questions regarding your visit, please contact your cardiology RN's, Arleen Meredith or Ivana Greenberg, at [phone].      To schedule a future appointment, we kindly ask that you call cardiology scheduling at [phone] three months prior to requested revisit date.  [facility] cardiology clinic is staffed with \"Advance Practice Providers\". These are our cardiology Physician Assistants and Nurse Practitioners.   Please call cardiology scheduling if you feel you need clinical evaluation with them at any time for any cardiac reason.   Reminder:  For your safety, we ask that you bring in your current medication(s) or an updated list of your medications with you to EACH office visit. Include the medication name, dose of pill on bottle and how you are taking it. Include over-the-counter medications or supplements. Your provider will review this at each visit and plan your care based on your current information.   ~~~~~~~~~~~~~~~~~~~~~~~~~~~~~~~~~~~~~~~  \"[facility]\" [place] telephone numbers for reference:  Cardiology Scheduling~[phone]  Diagnostic Imaging Scheduling~[phone]  Lab Scheduling~[phone]  Anticoagulation Clinic~[phone]  Cardiac Rehabilitation~[phone]  CORE Clinic RN's~[phone] (at [facility])  [place] Team [phone]  Cardiology Clinic RN's~[phone] (Ivana Greenberg, RN & Arleen Meredith, RN)  ~~~~~~~~~~~~~~~~~~~~~~~~~~~~~~~~~~~~~~~~        "

## 2019-07-29 ENCOUNTER — OFFICE VISIT (OUTPATIENT)
Dept: FAMILY MEDICINE | Facility: CLINIC | Age: 79
End: 2019-07-29
Payer: MEDICARE

## 2019-07-29 VITALS
BODY MASS INDEX: 27.89 KG/M2 | DIASTOLIC BLOOD PRESSURE: 66 MMHG | SYSTOLIC BLOOD PRESSURE: 114 MMHG | OXYGEN SATURATION: 98 % | WEIGHT: 229 LBS | HEIGHT: 76 IN | HEART RATE: 84 BPM

## 2019-07-29 DIAGNOSIS — G47.00 INSOMNIA, UNSPECIFIED TYPE: ICD-10-CM

## 2019-07-29 DIAGNOSIS — G47.10 EXCESSIVE SLEEPINESS: Primary | ICD-10-CM

## 2019-07-29 PROCEDURE — 99213 OFFICE O/P EST LOW 20 MIN: CPT | Performed by: NURSE PRACTITIONER

## 2019-07-29 ASSESSMENT — MIFFLIN-ST. JEOR: SCORE: 1845.24

## 2019-07-29 NOTE — PROGRESS NOTES
Subjective     Jameel Barrett is a 79 year old male who presents to clinic today for the following health issues:    Chief Complaint   Patient presents with     Referral     [facility] told him to see a family doc for a referral or prescription for a take home oximetry test before he can be seen there again     HPI     Patient presents today with paperwork from his cardiologist that he needs to have sleep apnea testing completed.  Patient states that he can fall asleep but is awakened in the night.  He feels like his sinuses are collapsing and he is unable to breathe.  He has excessive daytime tiredness also.    Patient Active Problem List   Diagnosis     Hyperlipidemia LDL goal <100     Neck pain     Headache     Ptosis-episodic     Diplopia     CAD (coronary artery disease)     Anxiety     Wild-type transthyretin-related (ATTR) amyloidosis (H)     Past Surgical History:   Procedure Laterality Date     CARDIAC SURGERY  4/8/08      CARDIAC SURGERY  4/12/07     JOINT REPLACEMENT, HIP RT/LT Right 07/30/2018    Joint Replacement Hip RT/LT     ORTHOPEDIC SURGERY  10/08    LT hip replacment       Social History     Tobacco Use     Smoking status: Never Smoker     Smokeless tobacco: Never Used   Substance Use Topics     Alcohol use: Yes     Comment: 1 drink daily     History reviewed. No pertinent family history.      Current Outpatient Medications   Medication Sig Dispense Refill     acetaminophen (TYLENOL) 500 MG tablet Take 1,000 mg by mouth 2 times daily        atorvastatin (LIPITOR) 20 MG tablet Take 10 mg by mouth every evening        bumetanide (BUMEX) 0.5 MG tablet Take 0.5 mg by mouth daily       fluticasone (FLONASE) 50 MCG/ACT spray Spray 2 sprays in nostril daily       loratadine (CLARITIN) 10 MG capsule Take 10 mg by mouth every other day        rivaroxaban ANTICOAGULANT (XARELTO) 20 MG TABS tablet Take 1 tablet (20 mg) by mouth daily (with dinner) 35 tablet 1     spironolactone (ALDACTONE) 25 MG tablet Take 12.5 mg  "by mouth daily        UNABLE TO FIND MEDICATION NAME: research tafamidis 61 mg tab 1 tab a day       Allergies   Allergen Reactions     Iodine Rash     THE IODINE HE WAS GIVEN  WAS IN THE FORM OF CONTRAST DYE AND HE DEVELOPED A RASH FROM IT       Reviewed and updated as needed this visit by Provider  Tobacco  Allergies  Meds  Problems  Med Hx  Surg Hx  Fam Hx         Review of Systems   ROS COMP: CONSTITUTIONAL: NEGATIVE for fever, chills, change in weight  RESP: NEGATIVE for significant cough or SOB  CV: NEGATIVE for chest pain, palpitations or peripheral edema  PSYCHIATRIC: NEGATIVE for changes in mood or affect  ROS otherwise negative      Objective    /66   Pulse 84   Ht 1.93 m (6' 4\")   Wt 103.9 kg (229 lb)   SpO2 98%   BMI 27.87 kg/m    Body mass index is 27.87 kg/m .  Physical Exam   GENERAL: healthy, alert and no distress  RESP: lungs clear to auscultation - no rales, rhonchi or wheezes  CV: regular rate and rhythm, normal S1 S2, no S3 or S4, no murmur, click or rub, no peripheral edema and peripheral pulses strong  PSYCH: mentation appears normal, affect normal/bright    Diagnostic Test Results:  Labs reviewed in Epic        Assessment & Plan     1. Excessive sleepiness  Uncertain if patient needs home study which he would like versus in clinic study.  Referral given to sleep clinic where they can determine which testing will give him the best outcome for results.  Discussed with patient that he can have that clinic fax the results to his cardiologist at [place].  - SLEEP EVALUATION & MANAGEMENT REFERRAL - [facility]  [phone] (Age 2 and up); Future    2. Insomnia, unspecified type  - SLEEP EVALUATION & MANAGEMENT REFERRAL - [facility]  [phone] (Age 2 and up); Future     See Patient Instructions    Return in about 1 week (around 8/5/2019) for with sleep clinic.    Hattie Crocker NP  [facility] " PRACTICE

## 2019-07-29 NOTE — PATIENT INSTRUCTIONS
1.  Make appointment with Sleep clinic and this will determine if you can do home study vs. Clinic study.

## 2019-08-26 ENCOUNTER — OFFICE VISIT (OUTPATIENT)
Dept: SLEEP MEDICINE | Facility: CLINIC | Age: 79
End: 2019-08-26
Payer: MEDICARE

## 2019-08-26 ENCOUNTER — OFFICE VISIT (OUTPATIENT)
Dept: SLEEP MEDICINE | Facility: CLINIC | Age: 79
End: 2019-08-26
Attending: NURSE PRACTITIONER
Payer: MEDICARE

## 2019-08-26 VITALS
SYSTOLIC BLOOD PRESSURE: 138 MMHG | BODY MASS INDEX: 27.89 KG/M2 | HEART RATE: 83 BPM | OXYGEN SATURATION: 99 % | HEIGHT: 76 IN | DIASTOLIC BLOOD PRESSURE: 80 MMHG | WEIGHT: 229 LBS

## 2019-08-26 DIAGNOSIS — J84.10 PULMONARY FIBROSIS (H): ICD-10-CM

## 2019-08-26 DIAGNOSIS — G47.00 INSOMNIA, UNSPECIFIED TYPE: ICD-10-CM

## 2019-08-26 DIAGNOSIS — G47.10 EXCESSIVE SLEEPINESS: Primary | ICD-10-CM

## 2019-08-26 DIAGNOSIS — G47.10 EXCESSIVE SLEEPINESS: ICD-10-CM

## 2019-08-26 DIAGNOSIS — E85.82 WILD-TYPE TRANSTHYRETIN-RELATED (ATTR) AMYLOIDOSIS (H): ICD-10-CM

## 2019-08-26 DIAGNOSIS — G47.52 RBD (REM BEHAVIORAL DISORDER): Primary | ICD-10-CM

## 2019-08-26 DIAGNOSIS — G47.52 RBD (REM BEHAVIORAL DISORDER): ICD-10-CM

## 2019-08-26 PROCEDURE — 99205 OFFICE O/P NEW HI 60 MIN: CPT | Performed by: FAMILY MEDICINE

## 2019-08-26 RX ORDER — CLONAZEPAM 0.5 MG/1
TABLET ORAL
Qty: 60 TABLET | Refills: 3 | Status: SHIPPED | OUTPATIENT
Start: 2019-08-26 | End: 2019-09-26

## 2019-08-26 ASSESSMENT — MIFFLIN-ST. JEOR: SCORE: 1845.24

## 2019-08-26 NOTE — PROGRESS NOTES
"  Sleep Consultation:    Date on this visit: 8/26/2019    Jameel Barrett is sent by Hattie Crocker for a sleep consultation regarding need for sleep testing and chronic insomnia.    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    Per Dr. Crocker, patient was recommended for sleep testing by cardiology at [place].  I reviewed their notes, but did not find a specific concern here.  His primary health issue is ATTR amyloidosis with significant cardiac involvement and suggestion of pulmonary fibrosis.  He is ~3 years out from diagnosis and was in a trial for tafamadis (orphan drug for amyloidosis, prevents fibrin formation) and was felt to be cause for his relatively stable condition.  But, now trial is over and is off tafamadis, reportedly will be coming on market, but with incredibly high cost.  He is very concerned about this, since the prognosis otherwise for ATTR amyloidosis is poor.  He also was found to have moderate to severe aortic valve stenosis with concern this may be causal for persistent SOB, is being evaluate for potential TAVR.      Most recent echocardiogram on 6/20/2019 with findings of cardiac amyloidosis, LVEF 53%, 2/4 LV diastolic dyfunction, mild decrease RV systolic function, RVSP ~49 mmHg.  Overall unchanged from prior 6/7/2018.    He feels his main sleep concern today is difficulty staying asleep at night.  He feels this is due to difficulty breathing through his nose when sleeping lateral.  He denies any snoring or observed apnea.    He currently goes to bed between 11:30pm and MN, feels he falls asleep \"ok\".  Will wake up on a \"bad\" night every hour, or on a \"good\" night at least twice.  It is unclear how long it takes to fall back " asleep, during these times he will often move to the couch, elevated legs, drink warm water or milk with some toast.  No caffeine.  He awakens around 9am naturally.    He denies any sleep walking, but has been told he will frequently yell / kick / lash out arms in his sleep, seeming to start over the past few years.  He believes these usually occur in the early AM hours.  These are often linked with very clear dream mentation, he becomes tearful when describing dream where he kicked a dog.  He also has worried he could injure his wife.  He denies any self injuries.    No caffeine use.    He has tried a few OTC sleep aids.  Tylenol PM with minimal benefit, melatonin and he felt it caused thrush.      Allergies:    Allergies   Allergen Reactions     Iodine Rash     THE IODINE HE WAS GIVEN  WAS IN THE FORM OF CONTRAST DYE AND HE DEVELOPED A RASH FROM IT       Medications:    Current Outpatient Medications   Medication Sig Dispense Refill     acetaminophen (TYLENOL) 500 MG tablet Take 1,000 mg by mouth 2 times daily        atorvastatin (LIPITOR) 20 MG tablet Take 10 mg by mouth every evening        bumetanide (BUMEX) 0.5 MG tablet Take 0.5 mg by mouth daily       fluticasone (FLONASE) 50 MCG/ACT spray Spray 2 sprays in nostril daily       loratadine (CLARITIN) 10 MG capsule Take 10 mg by mouth every other day        rivaroxaban ANTICOAGULANT (XARELTO) 20 MG TABS tablet Take 1 tablet (20 mg) by mouth daily (with dinner) 35 tablet 1     spironolactone (ALDACTONE) 25 MG tablet Take 12.5 mg by mouth daily        UNABLE TO FIND MEDICATION NAME: research tafamidis 61 mg tab 1 tab a day         Problem List:  Patient Active Problem List    Diagnosis Date Noted     Wild-type transthyretin-related (ATTR) amyloidosis (H) 07/13/2018     Priority: Medium     Anxiety 08/29/2017     Priority: Medium     CAD (coronary artery disease) 06/03/2014     Priority: Medium     S/p stent x 2 in [place], 2000 and 2001       Neck pain 06/08/2012      Priority: Medium     Headache 06/08/2012     Priority: Medium     Problem list name updated by automated process. Provider to review       Ptosis-episodic 06/08/2012     Priority: Medium     Diplopia 06/08/2012     Priority: Medium     Hyperlipidemia LDL goal <100 06/07/2012     Priority: Medium        Past Medical/Surgical History:  Past Medical History:   Diagnosis Date     Coronary artery disease      Hypertension      Skin cancer      Past Surgical History:   Procedure Laterality Date     CARDIAC SURGERY  4/8/08      CARDIAC SURGERY  4/12/07     JOINT REPLACEMENT, HIP RT/LT Right 07/30/2018    Joint Replacement Hip RT/LT     ORTHOPEDIC SURGERY  10/08    LT hip replacment       Social History:  Social History     Socioeconomic History     Marital status:      Spouse name: Not on file     Number of children: Not on file     Years of education: Not on file     Highest education level: Not on file   Occupational History     Not on file   Social Needs     Financial resource strain: Not on file     Food insecurity:     Worry: Not on file     Inability: Not on file     Transportation needs:     Medical: Not on file     Non-medical: Not on file   Tobacco Use     Smoking status: Never Smoker     Smokeless tobacco: Never Used   Substance and Sexual Activity     Alcohol use: Yes     Comment: 1 drink daily     Drug use: Not on file     Sexual activity: Not Currently   Lifestyle     Physical activity:     Days per week: Not on file     Minutes per session: Not on file     Stress: Not on file   Relationships     Social connections:     Talks on phone: Not on file     Gets together: Not on file     Attends Buddhism service: Not on file     Active member of club or organization: Not on file     Attends meetings of clubs or organizations: Not on file     Relationship status: Not on file     Intimate partner violence:     Fear of current or ex partner: Not on file     Emotionally abused: Not on file     Physically  "abused: Not on file     Forced sexual activity: Not on file   Other Topics Concern     Parent/sibling w/ CABG, MI or angioplasty before 65F 55M? Not Asked   Social History Narrative     Not on file       Family History:  No family history on file.    Review of Systems:  A complete review of systems reviewed by me is negative with the exeption of what has been mentioned in the history of present illness.    Physical Examination:  Vitals: /80   Pulse 83   Ht 1.93 m (6' 4\")   Wt 103.9 kg (229 lb)   SpO2 99%   BMI 27.87 kg/m    BMI= Body mass index is 27.87 kg/m .         No flowsheet data found.         GENERAL APPEARANCE: healthy, alert and no distress  RESP: lungs clear to auscultation - no rales, rhonchi or wheezes  CV: regular rates and rhythm, normal S1 S2, no S3 or S4 and systolic murmur appreciated.  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    1.)  Increased probability of GERRI, nocturnal hypoxemia   - Concerns increased by evidence of pulmonary fibrosis with ATTR amyloidosis, CAD, moderate to severe aortic valve stenosis, age, cardiac amyloidosis.   - He feels strongly this is not an issue and declined a sleep study, but we did agree to screen with overnight oximetry and if highly abnormal, proceed with PSG.    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  Given his co-morbid chronic insomnia, likely palliative care perspective given poor prognosis with ATTR amyloidosis, I feel it is reasonable for a trial of clonazepam 0.5-1mg PO at bedtime for RBD and improved quality of life.    Plan as given to patient as above.    I have spent 60 minutes " with this patient today in which greater than 50% of this time was spent in the counseling / coordination of care regarding RBD, insomnia, sleep disordered breathing.      Grady White MD    CC: Hattie Crocker

## 2019-08-26 NOTE — PROGRESS NOTES
Patient presented to clinic for  and demonstration of the MELODY. Patient was set up and instructed use. Patient verbalized understanding and demonstrated set up back to me. Patient will be returning device by 8/27/19 at 10:00am.    Patient was given sleep logs and written instructions for use.

## 2019-08-26 NOTE — PATIENT INSTRUCTIONS
For today, we will help arrange the simple home overnight oxygen test to make sure we aren't missing anything severe that we would want to treat.  You also describe what sounds dream enacting behaviors or is also called REM Sleep Behavior Disorder.  Given this and the difficulty staying asleep, I think it is very reasonable to consider a night-time medicine and I would recommend one that is called clonazepam.    For the clonazepam, we will use the 0.5mg tablets.  Start 1 at bed time, but ok to go up to 2 tablets if any residual symptoms.    Give me an update by phone ([phone]) or MyChart in 2 weeks.

## 2019-09-26 ENCOUNTER — OFFICE VISIT (OUTPATIENT)
Dept: FAMILY MEDICINE | Facility: CLINIC | Age: 79
End: 2019-09-26
Payer: MEDICARE

## 2019-09-26 VITALS
SYSTOLIC BLOOD PRESSURE: 128 MMHG | TEMPERATURE: 96.8 F | HEIGHT: 76 IN | WEIGHT: 233 LBS | BODY MASS INDEX: 28.37 KG/M2 | HEART RATE: 77 BPM | OXYGEN SATURATION: 98 % | RESPIRATION RATE: 16 BRPM | DIASTOLIC BLOOD PRESSURE: 74 MMHG

## 2019-09-26 DIAGNOSIS — J20.9 ACUTE BRONCHITIS WITH SYMPTOMS > 10 DAYS: Primary | ICD-10-CM

## 2019-09-26 PROCEDURE — 99213 OFFICE O/P EST LOW 20 MIN: CPT | Performed by: NURSE PRACTITIONER

## 2019-09-26 RX ORDER — CLONAZEPAM 0.5 MG/1
TABLET ORAL
COMMUNITY
Start: 2019-08-26 | End: 2020-07-23

## 2019-09-26 RX ORDER — DOXYCYCLINE HYCLATE 100 MG
100 TABLET ORAL 2 TIMES DAILY
Qty: 14 TABLET | Refills: 0 | Status: SHIPPED | OUTPATIENT
Start: 2019-09-26 | End: 2019-10-14

## 2019-09-26 ASSESSMENT — MIFFLIN-ST. JEOR: SCORE: 1863.38

## 2019-09-26 NOTE — PROGRESS NOTES
"Subjective     Jameel Barrett is a 79 year old male who presents to clinic today for the following health issues:    HPI   ENT Symptoms             Symptoms: cc Present Absent Comment   Fever/Chills   x    Fatigue  x     Muscle Aches   x    Eye Irritation   x    Sneezing   x Post nasal drip    Nasal Armando/Drg  x     Sinus Pressure/Pain   x    Loss of smell   x    Dental pain   x    Sore Throat   x    Swollen Glands   x    Ear Pain/Fullness   x    Cough x x  Productive, chest congestion, dry coughing spells occasionally   Wheeze  x  Intermittent, clears with couging   Chest Pain   x    Shortness of breath   x    Rash   x    Other         Symptom duration:  12 days, has upcoming surgery and would like to feel better by then.    Symptom severity:  moderate   Treatments tried:  coricidin HBP, claritin    Contacts:  wife had similar sx prior to his illness        Patient Active Problem List   Diagnosis     Hyperlipidemia LDL goal <100     Neck pain     Headache     Ptosis-episodic     Diplopia     CAD (coronary artery disease)     Anxiety     Wild-type transthyretin-related (ATTR) amyloidosis (H)     Past Surgical History:   Procedure Laterality Date     CARDIAC SURGERY  4/8/08      CARDIAC SURGERY  4/12/07     JOINT REPLACEMENT, HIP RT/LT Right 07/30/2018    Joint Replacement Hip RT/LT     ORTHOPEDIC SURGERY  10/08    LT hip replacment       Social History     Tobacco Use     Smoking status: Never Smoker     Smokeless tobacco: Never Used   Substance Use Topics     Alcohol use: Yes     Comment: occ     History reviewed. No pertinent family history.          Reviewed and updated as needed this visit by Provider         Review of Systems   ROS COMP: Constitutional, HEENT, cardiovascular, pulmonary, gi and gu systems are negative, except as otherwise noted.      Objective    /74   Pulse 77   Temp 96.8  F (36  C) (Tympanic)   Resp 16   Ht 1.93 m (6' 4\")   Wt 105.7 kg (233 lb)   SpO2 98%   BMI 28.36 kg/m    Body " "mass index is 28.36 kg/m .  Physical Exam   GENERAL: alert, no distress and elderly  EYES: Eyes grossly normal to inspection, PERRL and conjunctivae and sclerae normal  HENT: normal cephalic/atraumatic, nose and mouth without ulcers or lesions, nasal mucosa edematous , rhinorrhea clear, oropharynx clear, oral mucous membranes moist and sinuses: not tender  NECK: no adenopathy, no asymmetry, masses, or scars and thyroid normal to palpation  RESP: lungs clear to auscultation - no rales, rhonchi or wheezes  CV: regular rates and rhythm, normal S1 S2, no S3 or S4, grade 2/6 systolic murmur heard best over the LUSB, peripheral pulses strong and no peripheral edema  MS: no gross musculoskeletal defects noted, no edema  NEURO: Normal strength and tone, mentation intact and speech normal    Diagnostic Test Results:  Labs reviewed in Epic        Assessment & Plan       ICD-10-CM    1. Acute bronchitis with symptoms > 10 days J20.9 doxycycline hyclate (VIBRA-TABS) 100 MG tablet        BMI:   Estimated body mass index is 28.36 kg/m  as calculated from the following:    Height as of this encounter: 1.93 m (6' 4\").    Weight as of this encounter: 105.7 kg (233 lb).           FUTURE APPOINTMENTS:       - Follow up in 1 week for persistent symptoms, sooner for new or worsening symptoms.     Patient Instructions     Patient Education     Bronchitis, Antibiotic Treatment (Adult)    Bronchitis is an infection of the air passages (bronchial tubes) in your lungs. It often occurs when you have a cold. This illness is contagious during the first few days and is spread through the air by coughing and sneezing, or by direct contact (touching the sick person and then touching your own eyes, nose, or mouth).  Symptoms of bronchitis include cough with mucus (phlegm) and low-grade fever. Bronchitis usually lasts 7 to 14 days. Mild cases can be treated with simple home remedies. More severe infection is treated with an antibiotic.  Home " care  Follow these guidelines when caring for yourself at home:    If your symptoms are severe, rest at home for the first 2 to 3 days. When you go back to your usual activities, don't let yourself get too tired.    Don't smoke. Also stay away from secondhand smoke.    You may use over-the-counter medicines to control fever or pain, unless another medicine was prescribed. If you have chronic liver or kidney disease or have ever had a stomach ulcer or gastrointestinal bleeding, talk with your healthcare provider before using these medicines. Also talk to your provider if you are taking medicine to prevent blood clots. Aspirin should never be given to anyone younger than 18 who is ill with a viral infection or fever. It may cause severe liver or brain damage.    Your appetite may be low, so a light diet is fine. Stay well hydrated by drinking 6 to 8 glasses of fluids per day. This includes water, soft drinks, sports drinks, juices, tea, or soup. Extra fluids will help loosen mucus in your nose and lungs.    Over-the-counter cough, cold, and sore-throat medicines will not shorten the length of the illness, but they may be helpful to reduce your symptoms. Don't use decongestants if you have high blood pressure.    Finish all antibiotic medicine. Do this even if you are feeling better after only a few days.  Follow-up care  Follow up with your healthcare provider, or as advised. If you had an X-ray or ECG (electrocardiogram), a specialist will review it. You will be told of any new test results that may affect your care.  If you are age 65 or older, if you smoke, or if you have a chronic lung disease or condition that affects your immune system, ask your healthcare provider about getting a pneumococcal vaccine and a yearly flu shot (influenza vaccine).  When to seek medical advice  Call your healthcare provider right away if any of these occur:    Fever of 100.4 F (38 C) or higher, or as directed by your healthcare  provider    Coughing up more sputum    Weakness, drowsiness, headache, facial pain, ear pain, or a stiff neck  Call 911  Call 911 if any of these occur.    Coughing up blood    Weakness, drowsiness, headache, or stiff neck that get worse    Trouble breathing, wheezing, or pain with breathing  Date Last Reviewed: 6/1/2018 2000-2018 [employer]. [address] [place] [address]. All rights reserved. This information is not intended as a substitute for professional medical care. Always follow your healthcare professional's instructions.               No follow-ups on file.    BENY [facility]

## 2019-09-26 NOTE — PATIENT INSTRUCTIONS

## 2019-10-07 ENCOUNTER — MYC MEDICAL ADVICE (OUTPATIENT)
Dept: SLEEP MEDICINE | Facility: CLINIC | Age: 79
End: 2019-10-07

## 2019-10-07 ENCOUNTER — DOCUMENTATION ONLY (OUTPATIENT)
Dept: SLEEP MEDICINE | Facility: CLINIC | Age: 79
End: 2019-10-07

## 2019-10-07 NOTE — PROCEDURES
Documenting results of home overnight pulse oximetry performed night of 8/26/2019 on room air.    Total recording time 9:37:04 with valid time of 9:37:04.  Lowest pulse rate 58 with average pulse rate of 63.6.  Lowest SpO2 82%, mean / baseline SpO2 93.9%.  SpO2 < 88% for 6.3 minutes.  Oxygen desaturation index (4% or more desaturation, lasting 10+ seconds) of 7.1 / hour.    A/P:  1.) Suggestive of very mild sleep-associated hypoxemia and potential mild sleep-disordered breathing.    Grady Wihte MD

## 2019-10-14 ENCOUNTER — OFFICE VISIT (OUTPATIENT)
Dept: FAMILY MEDICINE | Facility: CLINIC | Age: 79
End: 2019-10-14
Payer: MEDICARE

## 2019-10-14 VITALS
HEIGHT: 76 IN | OXYGEN SATURATION: 97 % | BODY MASS INDEX: 28.35 KG/M2 | DIASTOLIC BLOOD PRESSURE: 62 MMHG | TEMPERATURE: 96.7 F | WEIGHT: 232.8 LBS | HEART RATE: 74 BPM | SYSTOLIC BLOOD PRESSURE: 148 MMHG

## 2019-10-14 DIAGNOSIS — I35.0 AORTIC VALVE STENOSIS, ETIOLOGY OF CARDIAC VALVE DISEASE UNSPECIFIED: ICD-10-CM

## 2019-10-14 DIAGNOSIS — I48.92 ATRIAL FLUTTER, UNSPECIFIED TYPE (H): ICD-10-CM

## 2019-10-14 DIAGNOSIS — Z29.89 NEED FOR SBE (SUBACUTE BACTERIAL ENDOCARDITIS) PROPHYLAXIS: ICD-10-CM

## 2019-10-14 DIAGNOSIS — E85.89 AMYLOIDOGENIC TRANSTHYRETIN AMYLOIDOSIS (H): ICD-10-CM

## 2019-10-14 DIAGNOSIS — Z95.2 S/P TAVR (TRANSCATHETER AORTIC VALVE REPLACEMENT): Primary | ICD-10-CM

## 2019-10-14 DIAGNOSIS — Z23 NEED FOR PROPHYLACTIC VACCINATION AND INOCULATION AGAINST INFLUENZA: ICD-10-CM

## 2019-10-14 PROBLEM — H25.13 AGE-RELATED NUCLEAR CATARACT OF BOTH EYES: Status: ACTIVE | Noted: 2019-09-19

## 2019-10-14 PROBLEM — Z79.01 LONG TERM (CURRENT) USE OF ANTICOAGULANTS: Status: ACTIVE | Noted: 2019-09-09

## 2019-10-14 PROBLEM — N18.30 HYPERTENSIVE KIDNEY DISEASE, STAGE III (H): Status: ACTIVE | Noted: 2019-09-09

## 2019-10-14 PROBLEM — I12.9 HYPERTENSIVE KIDNEY DISEASE, STAGE III (H): Status: ACTIVE | Noted: 2019-09-09

## 2019-10-14 PROBLEM — I50.9 CONGESTIVE HEART FAILURE (H): Status: ACTIVE | Noted: 2018-03-21

## 2019-10-14 PROBLEM — J84.10 PULMONARY FIBROSIS (H): Status: ACTIVE | Noted: 2018-05-17

## 2019-10-14 PROCEDURE — G0008 ADMIN INFLUENZA VIRUS VAC: HCPCS | Performed by: FAMILY MEDICINE

## 2019-10-14 PROCEDURE — 90662 IIV NO PRSV INCREASED AG IM: CPT | Performed by: FAMILY MEDICINE

## 2019-10-14 PROCEDURE — 99214 OFFICE O/P EST MOD 30 MIN: CPT | Mod: 25 | Performed by: FAMILY MEDICINE

## 2019-10-14 RX ORDER — METOPROLOL TARTRATE 25 MG/1
12.5 TABLET, FILM COATED ORAL 2 TIMES DAILY
Qty: 30 TABLET | Refills: 0 | COMMUNITY
Start: 2019-10-14 | End: 2021-07-20

## 2019-10-14 RX ORDER — METOPROLOL TARTRATE 25 MG/1
12.5 TABLET, FILM COATED ORAL
COMMUNITY
Start: 2019-10-08 | End: 2019-10-14

## 2019-10-14 ASSESSMENT — MIFFLIN-ST. JEOR: SCORE: 1862.47

## 2019-10-14 NOTE — PROGRESS NOTES
Subjective     Jameel Barrett is a 79 year old male who presents to clinic today for the following health issues:    HPI       Hospital Follow-up Visit:    Hospital/Nursing Home/IP Rehab Facility: [facility] [address] [facility], [address] Floor  Date of Admission: 10/07/2019  Date of Discharge: 10/08/2019  Reason(s) for Admission: Prosthesis Aortic Valve (Primary Dx);   Stenosis Aortic Valve Acquired            Problems taking medications regularly:  None       Medication changes since discharge: started metoprolol tartrate 25 mg tablet       Problems adhering to non-medication therapy:  None    Summary of hospitalization:  [facility] discharge summary reviewed- in EPIC  Diagnostic Tests/Treatments reviewed.  Follow up needed: has follow up at [place] on 10/30/2019  Other Healthcare Providers Involved in Patient s Care:         None  Update since discharge: improved.     Post Discharge Medication Reconciliation: discharge medications reconciled, continue medications without change.  Plan of care communicated with patient     Coding guidelines for this visit:  Type of Medical   Decision Making Face-to-Face Visit       within 7 Days of discharge Face-to-Face Visit        within 14 days of discharge   Moderate Complexity [id number]   High Complexity [id number]          Weight at discharge from [place] 103 kg    Wt Readings from Last 5 Encounters:   10/14/19 105.6 kg (232 lb 12.8 oz)   09/26/19 105.7 kg (233 lb)   08/26/19 103.9 kg (229 lb)   07/29/19 103.9 kg (229 lb)   05/30/19 103.9 kg (229 lb)     Patient is on a investigational drug from [place] - study is over but he's going to get the drug (Tafamidis) over the next year, then [facility] due to the high cost of the drug.  This is for his Amyloidogenic transthyretin amyloidosis.     Vascular Disease Follow-up      Are you having any of the following symptoms? (Select all that apply) No complaints of shortness of  "breath, chest pain or pressure.  No increased sweating or nausea with activity.  No left-sided neck or arm pain.  No complaints of pain in calves when walking 1-2 blocks.    How often do you take nitroglycerin? Never    Do you take an aspirin every day? Yes    Heart Failure Follow-up    Are you experiencing any shortness of breath? No    Are you experiencing any swelling in your legs or feet?  No    Are you using more pillows than usual? Yes    Do you cough at night?  Yes    Do you check your weight daily?  Yes    Have you had a weight change recently?  No    Are you having any of the following side effects from your medications? (Select all that apply)  The patient does not report symptoms of dizziness, fatigue, cough, swelling, or slow heart beat.    Is having some ringing in his ears. That just started this last week.         Reviewed and updated as needed this visit by Provider         Review of Systems   ROS COMP: Constitutional, HEENT, cardiovascular, pulmonary, gi and gu systems are negative, except as otherwise noted.      Objective    BP (!) 148/62 (BP Location: Right arm, Patient Position: Sitting, Cuff Size: Adult Large)   Pulse 74   Temp 96.7  F (35.9  C) (Tympanic)   Ht 1.93 m (6' 4\")   Wt 105.6 kg (232 lb 12.8 oz)   SpO2 97%   BMI 28.34 kg/m    Body mass index is 28.34 kg/m .  Physical Exam   GENERAL: healthy, alert and no distress  NECK: no adenopathy, no asymmetry, masses, or scars and thyroid normal to palpation  RESP: lungs clear to auscultation - no rales, rhonchi or wheezes  CV: regular rate and rhythm, normal S1 S2, no S3 or S4, no murmur, click or rub, no peripheral edema and peripheral pulses strong  ABDOMEN: soft, nontender, no hepatosplenomegaly, no masses and bowel sounds normal  MS: no gross musculoskeletal defects noted, no edema  Skin: mild bruising around groin access site - no hematoma  Right wrist with very mild erythema, no concern for cellulitis or lymphangitis.   Left " "forearm/wrist site is healed .    Diagnostic Test Results:  Labs reviewed in Epic        Assessment & Plan     1. S/P TAVR (transcatheter aortic valve replacement)   overall doing well.  Access sites are without infection, healing well.   He will be doing cardiac rehabilitation locally.    2. Atrial flutter, unspecified type (H)  Had a temporary a flutter postop that spontaneously converted.  He is on beta blocker twice daily, wondering if this can be adjusted so he's not splitting pills.  He will discuss with cardiology.   - aspirin (ASA) 81 MG EC tablet; Take 1 tablet (81 mg) by mouth daily    3. Aortic valve stenosis, etiology of cardiac valve disease unspecified   s/p TAVR    4. Amyloidogenic transthyretin amyloidosis (H)     - Tafamidis 61 MG CAPS; Take 1 capsule by mouth daily  Dispense: 30 capsule; Refill: 0    5. Need for prophylactic vaccination and inoculation against influenza     - INFLUENZA (HIGH DOSE) 3 VALENT VACCINE [25048]  - ADMIN INFLUENZA (For MEDICARE Patients ONLY) []    6. Need for SBE (subacute bacterial endocarditis) prophylaxis          BMI:   Estimated body mass index is 28.34 kg/m  as calculated from the following:    Height as of this encounter: 1.93 m (6' 4\").    Weight as of this encounter: 105.6 kg (232 lb 12.8 oz).               No follow-ups on file.    Azeb Street MD  [facility]    "

## 2019-11-08 ENCOUNTER — HEALTH MAINTENANCE LETTER (OUTPATIENT)
Age: 79
End: 2019-11-08

## 2020-02-23 ENCOUNTER — HEALTH MAINTENANCE LETTER (OUTPATIENT)
Age: 80
End: 2020-02-23

## 2020-06-30 ENCOUNTER — TRANSFERRED RECORDS (OUTPATIENT)
Dept: HEALTH INFORMATION MANAGEMENT | Facility: CLINIC | Age: 80
End: 2020-06-30

## 2020-07-08 ENCOUNTER — OFFICE VISIT (OUTPATIENT)
Dept: DERMATOLOGY | Facility: CLINIC | Age: 80
End: 2020-07-08
Payer: MEDICARE

## 2020-07-08 VITALS — OXYGEN SATURATION: 99 % | DIASTOLIC BLOOD PRESSURE: 72 MMHG | HEART RATE: 55 BPM | SYSTOLIC BLOOD PRESSURE: 137 MMHG

## 2020-07-08 DIAGNOSIS — L57.0 AK (ACTINIC KERATOSIS): Primary | ICD-10-CM

## 2020-07-08 DIAGNOSIS — L81.4 LENTIGO: ICD-10-CM

## 2020-07-08 DIAGNOSIS — L82.1 SEBORRHEIC KERATOSIS: ICD-10-CM

## 2020-07-08 PROCEDURE — 99203 OFFICE O/P NEW LOW 30 MIN: CPT | Performed by: DERMATOLOGY

## 2020-07-08 RX ORDER — CALCIPOTRIENE 50 UG/G
CREAM TOPICAL
Qty: 60 G | Refills: 3 | Status: SHIPPED | OUTPATIENT
Start: 2020-07-08 | End: 2021-07-20

## 2020-07-08 RX ORDER — FLUOROURACIL 50 MG/G
CREAM TOPICAL
Qty: 40 G | Refills: 1 | Status: SHIPPED | OUTPATIENT
Start: 2020-07-08 | End: 2021-07-20

## 2020-07-08 RX ORDER — CEPHALEXIN 500 MG/1
CAPSULE ORAL
COMMUNITY
Start: 2020-02-21 | End: 2021-07-20

## 2020-07-08 RX ORDER — TORSEMIDE 20 MG/1
10 TABLET ORAL DAILY
COMMUNITY
Start: 2020-02-19 | End: 2024-05-06

## 2020-07-08 NOTE — LETTER
7/8/2020         RE: Jameel Barrett  [address]  [place] [address]        Dear Colleague,    Thank you for referring your patient, Jameel Barrett, to the [facility]. Please see a copy of my visit note below.    Jameel Barrett is a 80 year old year old male patient here today for rash on forehead.   .  Patient states this has been present for a whle.  Patient reports the following symptoms:  Red and scale.  Patient reports the following previous treatments none.  These treatments did not work.  Patient reports the following modifying factors none.  Associated symptoms: none.  Patient has no other skin complaints today.  Remainder of the HPI, Meds, PMH, Allergies, FH, and SH was reviewed in chart.      Past Medical History:   Diagnosis Date     Coronary artery disease      Hypertension      Neck pain 6/8/2012     Skin cancer        Past Surgical History:   Procedure Laterality Date     CARDIAC SURGERY  4/8/08      CARDIAC SURGERY  4/12/07     CV TRANSCATHETER VALVE PLACEMENT  10/07/2019    at [place]     JOINT REPLACEMENT, HIP RT/LT Right 07/30/2018    Joint Replacement Hip RT/LT     ORTHOPEDIC SURGERY  10/08    LT hip replacment        Family History   Problem Relation Age of Onset     Dementia Mother      Diabetes Father      Hypertension Father        Social History     Socioeconomic History     Marital status:      Spouse name: Not on file     Number of children: Not on file     Years of education: Not on file     Highest education level: Not on file   Occupational History     Not on file   Social Needs     Financial resource strain: Not on file     Food insecurity     Worry: Not on file     Inability: Not on file     Transportation needs     Medical: Not on file     Non-medical: Not on file   Tobacco Use     Smoking status: Never Smoker     Smokeless tobacco: Never Used   Substance and Sexual Activity     Alcohol use: Yes     Comment: occ     Drug use: Never     Sexual activity: Not  Currently   Lifestyle     Physical activity     Days per week: Not on file     Minutes per session: Not on file     Stress: Not on file   Relationships     Social connections     Talks on phone: Not on file     Gets together: Not on file     Attends [employer] service: Not on file     Active member of club or organization: Not on file     Attends meetings of clubs or organizations: Not on file     Relationship status: Not on file     Intimate partner violence     Fear of current or ex partner: Not on file     Emotionally abused: Not on file     Physically abused: Not on file     Forced sexual activity: Not on file   Other Topics Concern     Parent/sibling w/ CABG, MI or angioplasty before 65F 55M? Not Asked   Social History Narrative     Not on file       Outpatient Encounter Medications as of 7/8/2020   Medication Sig Dispense Refill     acetaminophen (TYLENOL) 500 MG tablet Take 1,000 mg by mouth 2 times daily        aspirin (ASA) 81 MG EC tablet Take 1 tablet (81 mg) by mouth daily       atorvastatin (LIPITOR) 20 MG tablet Take 10 mg by mouth every evening        bumetanide (BUMEX) 0.5 MG tablet Take 0.5 mg by mouth daily       clonazePAM (KLONOPIN) 0.5 MG tablet as directed.       fluticasone (FLONASE) 50 MCG/ACT spray Spray 2 sprays in nostril daily       loratadine (CLARITIN) 10 MG capsule Take 10 mg by mouth every other day        metoprolol tartrate (LOPRESSOR) 25 MG tablet Take 0.5 tablets (12.5 mg) by mouth 2 times daily 30 tablet 0     rivaroxaban ANTICOAGULANT (XARELTO) 20 MG TABS tablet Take 1 tablet (20 mg) by mouth daily (with dinner) 35 tablet 1     spironolactone (ALDACTONE) 25 MG tablet Take 12.5 mg by mouth daily        Tafamidis 61 MG CAPS Take 1 capsule by mouth daily 30 capsule 0     No facility-administered encounter medications on file as of 7/8/2020.              Review Of Systems  Skin: As above  Eyes: negative  Ears/Nose/Throat: negative  Respiratory: No shortness of breath, dyspnea on  exertion, cough, or hemoptysis  Cardiovascular: negative  Gastrointestinal: negative  Genitourinary: negative  Musculoskeletal: negative  Neurologic: negative  Psychiatric: negative  Hematologic/Lymphatic/Immunologic: negative  Endocrine: negative      O:   NAD, WDWN, Alert & Oriented, Mood & Affect wnl, Vitals stable   Here today alone   There were no vitals taken for this visit.   General appearance normal   Vitals stable   Alert, oriented and in no acute distress     Gritty papules on forehead and temple   Stuck on papules and brown macules on trunk and ext      The remainder of expanded problem focused exam was normal; the following areas were examined:  scalp/hair, conjunctiva/lids, face, neck, lips, chest, digits/nails, RUE, LUE.      Eyes: Conjunctivae/lids:Normal     ENT: Lips, buccal mucosa, tongue: normal    MSK:Normal    Cardiovascular: peripheral edema none    Pulm: Breathing Normal    Neuro/Psych: Orientation:Alert and Orientedx3 ; Mood/Affect:normal       A/P:  1. Seborrheic keratosis, lentigo,   2. Actinic keratosis   Using 5-Flurouracil Cream    5-Fluorouracil (5FU) topical cream (brand names Efudex, Carac) is a prescription topical medicine to treat actinic keratoses (pre-squamous cell skin cancer lesions), sun-damaged skin as well as superficial skin cancers.    When applied the areas of sun-damaged skin, the 5FU will  find  damaged skin cells and destroy them.   During treatment, the skin will become red and look very irritated. This is the expected  normal response,  Some patients using 5FU show minimal redness and scaling while others have a very  vigorous  response where the skin scabs and peels. The important thing to realize is that 5FU is treating sun-damaged skin that carries skin cancer risk.      While the skin is irritated, open, sore or scabbed you can apply aquaphor, vaseline or 1% hydrocortisone cream in the morning.     You should apply a thin layer of the cream to the affected area  twice a day for 2  weeks every night. A strip of cream the length of your finger tip should be enough to cover your entire face.  For tougher skin like arms, legs, or back, we may suggest longer treatment plans.  However if you react really strong and fast, you might stop earlier or use less frequently. - please call if it is very strong and you are concern you might need to stop early.     If you prescription coverage allows we may add calcipotriene (Dovonex ), a vitamin-D derivative, to the treatment plan.  In these cases we will have you mix the calcipotriene with the efudex to help shorten the treatment course and improve outcomes.      Typically very strong reactions are related to lots of underlying sun damage, and this means you are getting a good response to the medication. However, there is no need to be miserable while using this. Please let us know if you are having trouble or concerns!      BENIGN LESIONS DISCUSSED WITH PATIENT:  I discussed the specifics of tumor, prognosis, and genetics of benign lesions.  I explained that treatment of these lesions would be purely cosmetic and not medically neccessary.  I discussed with patient different removal options including excision, cautery and /or laser.      Nature and genetics of benign skin lesions dicussed with patient.  Signs and Symptoms of skin cancer discussed with patient.  Patient encouraged to perform monthly skin exams.  UV precautions reviewed with patient.  Skin care regimen reviewed with patient: Eliminate harsh soaps, i.e. Dial, zest, irsih spring; Mild soaps such as Cetaphil or Dove sensitive skin, avoid hot or cold showers, aggressive use of emollients including vanicream, cetaphil or cerave discussed with patient.    Risks of non-melanoma skin cancer discussed with patient   Return to clinic 3 months      Again, thank you for allowing me to participate in the care of your patient.        Sincerely,        Tray Valdez MD

## 2020-07-08 NOTE — PROGRESS NOTES
Jameel Barrett is a 80 year old year old male patient here today for rash on forehead.   .  Patient states this has been present for a whle.  Patient reports the following symptoms:  Red and scale.  Patient reports the following previous treatments none.  These treatments did not work.  Patient reports the following modifying factors none.  Associated symptoms: none.  Patient has no other skin complaints today.  Remainder of the HPI, Meds, PMH, Allergies, FH, and SH was reviewed in chart.      Past Medical History:   Diagnosis Date     Coronary artery disease      Hypertension      Neck pain 6/8/2012     Skin cancer        Past Surgical History:   Procedure Laterality Date     CARDIAC SURGERY  4/8/08      CARDIAC SURGERY  4/12/07     CV TRANSCATHETER VALVE PLACEMENT  10/07/2019    at [place]     JOINT REPLACEMENT, HIP RT/LT Right 07/30/2018    Joint Replacement Hip RT/LT     ORTHOPEDIC SURGERY  10/08    LT hip replacment        Family History   Problem Relation Age of Onset     Dementia Mother      Diabetes Father      Hypertension Father        Social History     Socioeconomic History     Marital status:      Spouse name: Not on file     Number of children: Not on file     Years of education: Not on file     Highest education level: Not on file   Occupational History     Not on file   Social Needs     Financial resource strain: Not on file     Food insecurity     Worry: Not on file     Inability: Not on file     Transportation needs     Medical: Not on file     Non-medical: Not on file   Tobacco Use     Smoking status: Never Smoker     Smokeless tobacco: Never Used   Substance and Sexual Activity     Alcohol use: Yes     Comment: occ     Drug use: Never     Sexual activity: Not Currently   Lifestyle     Physical activity     Days per week: Not on file     Minutes per session: Not on file     Stress: Not on file   Relationships     Social connections     Talks on phone: Not on file     Gets together: Not on file      Attends [employer] service: Not on file     Active member of club or organization: Not on file     Attends meetings of clubs or organizations: Not on file     Relationship status: Not on file     Intimate partner violence     Fear of current or ex partner: Not on file     Emotionally abused: Not on file     Physically abused: Not on file     Forced sexual activity: Not on file   Other Topics Concern     Parent/sibling w/ CABG, MI or angioplasty before 65F 55M? Not Asked   Social History Narrative     Not on file       Outpatient Encounter Medications as of 7/8/2020   Medication Sig Dispense Refill     acetaminophen (TYLENOL) 500 MG tablet Take 1,000 mg by mouth 2 times daily        aspirin (ASA) 81 MG EC tablet Take 1 tablet (81 mg) by mouth daily       atorvastatin (LIPITOR) 20 MG tablet Take 10 mg by mouth every evening        bumetanide (BUMEX) 0.5 MG tablet Take 0.5 mg by mouth daily       clonazePAM (KLONOPIN) 0.5 MG tablet as directed.       fluticasone (FLONASE) 50 MCG/ACT spray Spray 2 sprays in nostril daily       loratadine (CLARITIN) 10 MG capsule Take 10 mg by mouth every other day        metoprolol tartrate (LOPRESSOR) 25 MG tablet Take 0.5 tablets (12.5 mg) by mouth 2 times daily 30 tablet 0     rivaroxaban ANTICOAGULANT (XARELTO) 20 MG TABS tablet Take 1 tablet (20 mg) by mouth daily (with dinner) 35 tablet 1     spironolactone (ALDACTONE) 25 MG tablet Take 12.5 mg by mouth daily        Tafamidis 61 MG CAPS Take 1 capsule by mouth daily 30 capsule 0     No facility-administered encounter medications on file as of 7/8/2020.              Review Of Systems  Skin: As above  Eyes: negative  Ears/Nose/Throat: negative  Respiratory: No shortness of breath, dyspnea on exertion, cough, or hemoptysis  Cardiovascular: negative  Gastrointestinal: negative  Genitourinary: negative  Musculoskeletal: negative  Neurologic: negative  Psychiatric: negative  Hematologic/Lymphatic/Immunologic: negative  Endocrine:  negative      O:   NAD, WDWN, Alert & Oriented, Mood & Affect wnl, Vitals stable   Here today alone   There were no vitals taken for this visit.   General appearance normal   Vitals stable   Alert, oriented and in no acute distress     Gritty papules on forehead and temple   Stuck on papules and brown macules on trunk and ext      The remainder of expanded problem focused exam was normal; the following areas were examined:  scalp/hair, conjunctiva/lids, face, neck, lips, chest, digits/nails, RUE, LUE.      Eyes: Conjunctivae/lids:Normal     ENT: Lips, buccal mucosa, tongue: normal    MSK:Normal    Cardiovascular: peripheral edema none    Pulm: Breathing Normal    Neuro/Psych: Orientation:Alert and Orientedx3 ; Mood/Affect:normal       A/P:  1. Seborrheic keratosis, lentigo,   2. Actinic keratosis   Using 5-Flurouracil Cream    5-Fluorouracil (5FU) topical cream (brand names Efudex, Carac) is a prescription topical medicine to treat actinic keratoses (pre-squamous cell skin cancer lesions), sun-damaged skin as well as superficial skin cancers.    When applied the areas of sun-damaged skin, the 5FU will  find  damaged skin cells and destroy them.   During treatment, the skin will become red and look very irritated. This is the expected  normal response,  Some patients using 5FU show minimal redness and scaling while others have a very  vigorous  response where the skin scabs and peels. The important thing to realize is that 5FU is treating sun-damaged skin that carries skin cancer risk.      While the skin is irritated, open, sore or scabbed you can apply aquaphor, vaseline or 1% hydrocortisone cream in the morning.     You should apply a thin layer of the cream to the affected area twice a day for 2  weeks every night. A strip of cream the length of your finger tip should be enough to cover your entire face.  For tougher skin like arms, legs, or back, we may suggest longer treatment plans.  However if you react  really strong and fast, you might stop earlier or use less frequently. - please call if it is very strong and you are concern you might need to stop early.     If you prescription coverage allows we may add calcipotriene (Dovonex ), a vitamin-D derivative, to the treatment plan.  In these cases we will have you mix the calcipotriene with the efudex to help shorten the treatment course and improve outcomes.      Typically very strong reactions are related to lots of underlying sun damage, and this means you are getting a good response to the medication. However, there is no need to be miserable while using this. Please let us know if you are having trouble or concerns!      BENIGN LESIONS DISCUSSED WITH PATIENT:  I discussed the specifics of tumor, prognosis, and genetics of benign lesions.  I explained that treatment of these lesions would be purely cosmetic and not medically neccessary.  I discussed with patient different removal options including excision, cautery and /or laser.      Nature and genetics of benign skin lesions dicussed with patient.  Signs and Symptoms of skin cancer discussed with patient.  Patient encouraged to perform monthly skin exams.  UV precautions reviewed with patient.  Skin care regimen reviewed with patient: Eliminate harsh soaps, i.e. Dial, zest, irsih spring; Mild soaps such as Cetaphil or Dove sensitive skin, avoid hot or cold showers, aggressive use of emollients including vanicream, cetaphil or cerave discussed with patient.    Risks of non-melanoma skin cancer discussed with patient   Return to clinic 3 months

## 2020-07-08 NOTE — NURSING NOTE
"Initial /72 (BP Location: Left arm, Patient Position: Sitting, Cuff Size: Adult Large)   Pulse 55   SpO2 99%  Estimated body mass index is 28.34 kg/m  as calculated from the following:    Height as of 10/14/19: 1.93 m (6' 4\").    Weight as of 10/14/19: 105.6 kg (232 lb 12.8 oz). .      "

## 2020-07-08 NOTE — PATIENT INSTRUCTIONS
-Use Efudex twice a day for 10 days on your temples and forehead.    -You will become red and irritated (that is what is supposed to happen)    -Do this when you will be able to stay away from the outdoors for 2 weeks    -Return to the clinic for a follow up 1 month after your last application of the Efudex (before you leave for the cold season)

## 2020-07-13 PROBLEM — I33.0 ACUTE BACTERIAL ENDOCARDITIS: Status: ACTIVE | Noted: 2020-07-13

## 2020-07-22 NOTE — PATIENT INSTRUCTIONS
Our Clinic hours are:  Mondays    7:20 am - 7 pm  Tues - Fri  7:20 am - 5 pm    Clinic Phone: [phone]    The clinic lab opens at 7:30 am Mon - Fri and appointments are required.    [facility]. [phone]  Monday  8 am - 7pm  Tues - Fri 8 am - 5:30 pm         Before Your Surgery      Call your surgeon if there is any change in your health. This includes signs of a cold or flu (such as a sore throat, runny nose, cough, rash or fever).    Do not smoke, drink alcohol or take over the counter medicine (unless your surgeon or primary care doctor tells you to) for the 24 hours before and after surgery.    If you take prescribed drugs: Follow your doctor s orders about which medicines to take and which to stop until after surgery.    Eating and drinking prior to surgery: follow the instructions from your surgeon    Take a shower or bath the night before surgery. Use the soap your surgeon gave you to gently clean your skin. If you do not have soap from your surgeon, use your regular soap. Do not shave or scrub the surgery site.  Wear clean pajamas and have clean sheets on your bed.

## 2020-07-22 NOTE — PROGRESS NOTES
[facility]  [address] [address]  [phone]  Dept: [phone]    PRE-OP EVALUATION:  Today's date: 2020    Jameel Barrett (: 1940) presents for pre-operative evaluation assessment as requested by Dr. Balbuena.  He requires evaluation and anesthesia risk assessment prior to undergoing surgery/procedure for treatment of bilateral cataracts.    Proposed Surgery/ Procedure: cataracts  Date of Surgery/ Procedure: 2020 and 2020  Time of Surgery/ Procedure: [facility]  Hospital/Surgical Facility: [facility]  Surgery Fax Number: [phone]  Primary Physician: Azeb Street  Type of Anesthesia Anticipated: to be determined    Preoperative Questionnaire:   no- Have you ever had a heart attack or stroke?  YES - Have you ever had surgery on your heart or blood vessels, such as a stent, coronary (heart) bypass, or surgery on an artery in the head, neck, heart, or legs?  No - Do you have chest pain when you are physically active?  YES - Do you have a history of heart failure?  No - Do you currently have a cold, bronchitis, or symptoms of other respiratory (head and chest) infections?  No - Do you have a cough, shortness of breath, or wheezing?  No - Do you or anyone in your family have a history of blood clots?  No - Do you or anyone in your family have a serious bleeding problem, such as long-lasting bleeding after surgeries or cuts?  YES - Have you ever had anemia or been told to take iron pills?  No - Have you had any abnormal blood loss such as black, tarry or bloody stools, or abnormal vaginal bleeding?  No - Have you ever had a blood transfusion?  Yes - Are you willing to have a blood transfusion if it is medically needed before, during, or after your surgery?  No - Have you or anyone in your family ever had problems with anesthesia (sedation for surgery)?  No - Do you have sleep apnea, excessive snoring, or daytime drowsiness?   No - Do you have any  artifical heart valves or other implanted medical devices, such as a pacemaker, defibrillator, or continuous glucose monitor?  YES - Do you have any artifical joints? Bilateral hip  No - Are you allergic to latex?  No - Is there any chance that you may be pregnant?    Patient has a Health Care Directive or Living Will:  YES on file    HPI:     HPI related to upcoming procedure: progressive senile cataract bilaterally causing visual impairment.       See problem list for active medical problems.  Problems all longstanding and stable, except as noted/documented.  See ROS for pertinent symptoms related to these conditions.      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 According to patient, he has failed therapies to treat and improve, this condition is being monitored and followed by the [facility].  Overview:     ATTR amyloid cardiac biopsy July 2016    Echocardiogram June 2017, moderate to severe aortic stenosis, 0.9-1.0 cm , IVS 2.2, LVPW 1.8, peak gradient 49.6, mean gradient 27.3, restrictive pattern    Last Assessment & Plan:   ATTR amyloid cardiac biopsy performed July 2016. According to patient, he has failed therapies to treat and improve, this condition is being monitored and followed by the [facility].    Cardiology saw patient preoperatively:  Coronary angiography performed elsewhere last month is reassuring. Invasive and noninvasive evaluation of his aortic valve again reassuring. He is able to complete at least 4 METS  based on his history and our objective testing today. Accordingly from my standpoint he ought to be okay proceeding with noncardiac orthopedic surgery. I would nonetheless recommend that a cardiac anesthesiologist be involved in his care particularly in the setting of cardiac amyloid physiology. Massive fluid shifts, hypotension and hypovolemia ought to be avoided.     Despite his amyloid physiology, his coronary artery disease appears to have warranted prescription of beta-blocker therapy in the past. He is currently tolerating this very well. This ought to be continued perioperatively. Should there be any suggestion of symptomatic hypotension or orthostasis I would have a low threshold for its discontinuation. I do not expect this due to his preserved stroke volume index.       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  Open heart surgery 1/14/2020 to replace aortic valve  Antibiotic x 1 year  LVEF 50-55%       Need for SBE (subacute bacterial endocarditis) prophylaxis 10/14/2019     Priority: Medium     lifelong       Atrial flutter (H) 10/07/2019     Priority: Medium     Developed post op after TAVR 10/7/2019  Started on beta blocker, spontaneously converted  Xarelto/asa lifelong       Age-related nuclear cataract of both eyes 09/19/2019     Priority: Medium     Hypertensive kidney disease, stage III (H) 09/09/2019     Priority: Medium     Long term (current) use of anticoagulants 09/09/2019     Priority: Medium     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 No treatments have improved this chronic condition. Has PRN albuterol. Following with  PCP.  Overview:   Last Assessment & Plan:   PFTs 12/28/17 reveal an FEV1 of 60%, moderate to severe restriction of lung function, and the patient was unable to complete exam. No treatments have improved this chronic condition. Has PRN albuterol. Following with PCP.       Congestive heart failure (H) 03/21/2018     Priority: Medium     Anxiety 08/29/2017     Priority: Medium     Aortic valve stenosis 06/09/2016     Priority: Medium     Added automatically from request for surgery [id number]  Now s/p TAVR 10/7/2019       Coronary arteriosclerosis in native artery 03/12/2014     Priority: Medium     S/p stent x 2 in [place], 2000 and 2001    Overview:   Drug-eluting stents to LAD 2007 and 2008 Catheterization 2010: LAD 30%, patent stents, diagonal one 30%, circumflex 30%, RCA 55% Nuclear stress test March 2013: Sascha protocol 8 minutes 58 seconds; no ischemia; LVEF 54% Cath Nov 2014: patent stents; mild CADThe patient continues to have what is felt to be an anginal equivalent with exertional jaw pain that is relieved by rest.  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  Moderate aortic stenosis.  2.  Severe cardiomyopathy, nonischemic.35%  3.  Mild-to-moderate coronary artery disease. RCA 60-70% LAD stent patent  4.  Severe pulmonary hypertension with elevated wedge. 65/22  5. No evidence of constriction    meds adjusted:  We will discontinue atenolol and   discontinue Naprosyn and instead use Coreg 3.125 mg twice a day, increase Demadex to   5 mg 2 tablets daily, and add Aldactone 25 mg 1/2 tablet daily.         Last Assessment & Plan:   Catheterization 5/2018 demonstrates moderate aortic stenosis, severe cardiomyopathy nonischemic 35%, mild to moderate coronary artery disease. RCA 60-70% nonobstructive, LAD stent patent. Severe pulmonary hypertension 65/22. Continue guideline directed medical  therapy.  Overview:   Overview:   Drug-eluting stents to LAD 2007 and 2008 Catheterization 2010: LAD 30%, patent stents, diagonal one 30%, circumflex 30%, RCA 55% Nuclear stress test March 2013: Sascha protocol 8 minutes 58 seconds; no ischemia; LVEF 54% Cath Nov 2014: patent stents; mild CADThe patient continues to have what is felt to be an anginal equivalent with exertional jaw pain that is relieved by rest.  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  Moderate aortic stenosis.  2.  Severe cardiomyopathy, nonischemic.35%  3.  Mild-to-moderate coronary artery disease. RCA 60-70% LAD stent patent  4.  Severe pulmonary hypertension with elevated wedge. 65/22  5. No evidence of constriction    meds adjusted:  We will discontinue atenolol and   discontinue Naprosyn and instead use Coreg 3.125 mg twice a day, increase Demadex to   5 mg 2 tablets daily, and add Aldactone 25 mg 1/2 tablet daily.       Last Assessment & Plan:   Catheterization 5/2018 demonstrates moderate aortic stenosis, severe cardiomyopathy nonischemic 35%, mild to moderate coronary artery disease. RCA 60-70% nonobstructive, LAD stent patent. Severe pulmonary hypertension 65/22. Continue guideline directed medical therapy.    Overview:   ATTR amyloid cardiac biopsy July 2016    Echocardiogram June 2017, moderate to severe aortic stenosis, 0.9-1.0 cm , IVS 2.2, LVPW 1.8, peak gradient 49.6, mean gradient 27.3, restrictive pattern    Last Assessment & Plan:   ATTR amyloid cardiac biopsy performed July 2016. According to patient, he has failed therapies to treat and improve, this condition is being monitored and followed by the [facility].  Overview:     ATTR amyloid cardiac biopsy July 2016    Echocardiogram June 2017, moderate to severe aortic stenosis, 0.9-1.0 cm , IVS 2.2, LVPW 1.8, peak gradient 49.6, mean gradient 27.3,  restrictive pattern    Last Assessment & Plan:   ATTR amyloid cardiac biopsy performed July 2016. According to patient, he has failed therapies to treat and improve, this condition is being monitored and followed by the [facility].    Cardiology saw patient preoperatively:  Coronary angiography performed elsewhere last month is reassuring. Invasive and noninvasive evaluation of his aortic valve again reassuring. He is able to complete at least 4 METS based on his history and our objective testing today. Accordingly from my standpoint he ought to be okay proceeding with noncardiac orthopedic surgery. I would nonetheless recommend that a cardiac anesthesiologist be involved in his care particularly in the setting of cardiac amyloid physiology. Massive fluid shifts, hypotension and hypovolemia ought to be avoided.     Despite his amyloid physiology, his coronary artery disease appears to have warranted prescription of beta-blocker therapy in the past. He is currently tolerating this very well. This ought to be continued perioperatively. Should there be any suggestion of symptomatic hypotension or orthostasis I would have a low threshold for its discontinuation. I do not expect this due to his preserved stroke volume index.       Hypertension 03/12/2014     Priority: Medium     Last Assessment & Plan:   Chronic hypertension, clinically stable.  Continue aggressive risk factor modification and medical management.   Last Assessment & Plan:   Chronic hypertension, clinically stable.  Continue aggressive risk factor modification and medical management.       Ptosis-episodic 06/08/2012     Priority: Medium     Diplopia 06/08/2012     Priority: Medium     Hyperlipidemia LDL goal <100 06/07/2012     Priority: Medium      Past Medical History:   Diagnosis Date     Coronary artery disease      Hypertension      Neck pain 6/8/2012     Skin cancer      Past Surgical History:   Procedure Laterality Date     CARDIAC SURGERY  4/8/08  "     CARDIAC SURGERY  4/12/07     CV TRANSCATHETER VALVE PLACEMENT  10/07/2019    at [facility]     JOINT REPLACEMENT, HIP RT/LT Right 07/30/2018    Joint Replacement Hip RT/LT     ORTHOPEDIC SURGERY  10/08    LT hip replacment     Current Outpatient Medications   Medication Sig Dispense Refill     acetaminophen (TYLENOL) 500 MG tablet Take 1,000 mg by mouth 2 times daily        atorvastatin (LIPITOR) 20 MG tablet Take 10 mg by mouth every evening        calcipotriene (DOVONEX) 0.005 % external cream Mix with efudex and apply twice daily to face for ten days 60 g 3     cephALEXin (KEFLEX) 500 MG capsule TAKE 1 CAPSULE BY MOUTH THREE TIMES DAILY FOR 14 DAYS       fluorouracil (EFUDEX) 5 % external cream Mix with dovonex and apply twice daily to face for ten days 40 g 1     metoprolol tartrate (LOPRESSOR) 25 MG tablet Take 0.5 tablets (12.5 mg) by mouth 2 times daily 30 tablet 0     rivaroxaban ANTICOAGULANT (XARELTO) 20 MG TABS tablet Take 1 tablet (20 mg) by mouth daily (with dinner) 35 tablet 1     Tafamidis Meglumine, Cardiac, 20 MG CAPS Take 80 mg by mouth       torsemide (DEMADEX) 20 MG tablet TAKE 1 TABLET BY MOUTH TWICE DAILY FOR 5 DAYS THEN DECREASE TO 1 TABLET DAILY       OTC products: None, except as noted above    Allergies   Allergen Reactions     Iodine Rash     THE IODINE HE WAS GIVEN  WAS IN THE FORM OF CONTRAST DYE AND HE DEVELOPED A RASH FROM IT      Latex Allergy: NO    Social History     Tobacco Use     Smoking status: Never Smoker     Smokeless tobacco: Never Used   Substance Use Topics     Alcohol use: Yes     Comment: occ     History   Drug Use Unknown       REVIEW OF SYSTEMS:   CONSTITUTIONAL: NEGATIVE for fever, chills, change in weight  ENT/MOUTH: NEGATIVE for ear, mouth and throat problems  RESP: NEGATIVE for significant cough or SOB  CV: NEGATIVE for chest pain, palpitations or peripheral edema    EXAM:   /60   Pulse 58   Temp 97.6  F (36.4  C) (Tympanic)   Resp 16   Ht 1.93 m (6' 4\")  "  Wt 102.1 kg (225 lb)   SpO2 98%   BMI 27.39 kg/m    GENERAL APPEARANCE: healthy, alert and no distress  HENT: ear canals and TM's normal and nose and mouth without ulcers or lesions  RESP: lungs clear to auscultation - no rales, rhonchi or wheezes  CV: regular rate and rhythm, normal S1 S2, no S3 or S4 and no murmur, click or rub   ABDOMEN: soft, nontender, no HSM or masses and bowel sounds normal  NEURO: Normal strength and tone, sensory exam grossly normal, mentation intact and speech normal    DIAGNOSTICS:   No labs or EKG required for low risk surgery (cataract, skin procedure, breast biopsy, etc)    Recent Labs   Lab Test 08/07/18  1840 08/01/18 07/31/18 07/13/18  1044   HGB 10.7*  --   --  13.7     --   --  253   INR 1.13  --  1.1  --      --   --  140   POTASSIUM 4.2  --  4.0 3.9   CR 1.10 1.17*  --  1.09        IMPRESSION:   Reason for surgery/procedure: senile cataracts  Diagnosis/reason for consult: preoperative evaluation and medical risk assessment    The proposed surgical procedure is considered LOW risk.    REVISED CARDIAC RISK INDEX  The patient has the following serious cardiovascular risks for perioperative complications such as (MI, PE, VFib and 3  AV Block):  Coronary Artery Disease (MI, positive stress test, angina, Qs on EKG)  Congestive Heart Failure (pulmonary edema, PND, s3 tonie, CXR with pulmonary congestion, basilar rales)  History of acute bacterial endocarditis - on antibiotic until 1/2021  INTERPRETATION: 2 risks: Class III (moderate risk - 6.6% complication rate)    The patient has the following additional risks for perioperative complications:  No identified additional risks      ICD-10-CM    1. Preop general physical exam  Z01.818    2. Age-related cataract of both eyes, unspecified age-related cataract type  H25.9    3. Pulmonary fibrosis (H)  J84.10    4. Presence of prosthetic heart valve  Z95.2    5. Coronary arteriosclerosis in native artery  I25.10    6.  Congestive heart failure, unspecified HF chronicity, unspecified heart failure type (H)  I50.9    7. Essential hypertension  I10    8. Amyloidogenic transthyretin amyloidosis (H)  E85.89        RECOMMENDATIONS:     --surgery center will arrange for COVID testing prior to surgery    --Patient is to take all scheduled medications on the day of surgery EXCEPT for modifications listed below.    Anticoagulant or Antiplatelet Medication Use  XARELTO: Bleeding risk is low for this procedure and rivaroxaban (Xarelto) may be continued in the perioperative period due to patients elevated risk off anticoagulants        APPROVAL GIVEN to proceed with proposed procedure, without further diagnostic evaluation       Signed Electronically by: Azeb Street MD    Copy of this evaluation report is provided to requesting physician.    [place] Preop Guidelines    Revised Cardiac Risk Index

## 2020-07-23 ENCOUNTER — OFFICE VISIT (OUTPATIENT)
Dept: FAMILY MEDICINE | Facility: CLINIC | Age: 80
End: 2020-07-23
Payer: MEDICARE

## 2020-07-23 VITALS
WEIGHT: 225 LBS | HEIGHT: 76 IN | HEART RATE: 58 BPM | SYSTOLIC BLOOD PRESSURE: 124 MMHG | DIASTOLIC BLOOD PRESSURE: 60 MMHG | TEMPERATURE: 97.6 F | BODY MASS INDEX: 27.4 KG/M2 | RESPIRATION RATE: 16 BRPM | OXYGEN SATURATION: 98 %

## 2020-07-23 DIAGNOSIS — I25.10 CORONARY ARTERIOSCLEROSIS IN NATIVE ARTERY: ICD-10-CM

## 2020-07-23 DIAGNOSIS — Z01.818 PREOP GENERAL PHYSICAL EXAM: Primary | ICD-10-CM

## 2020-07-23 DIAGNOSIS — E85.89 AMYLOIDOGENIC TRANSTHYRETIN AMYLOIDOSIS (H): ICD-10-CM

## 2020-07-23 DIAGNOSIS — H25.9 AGE-RELATED CATARACT OF BOTH EYES, UNSPECIFIED AGE-RELATED CATARACT TYPE: ICD-10-CM

## 2020-07-23 DIAGNOSIS — I50.9 CONGESTIVE HEART FAILURE, UNSPECIFIED HF CHRONICITY, UNSPECIFIED HEART FAILURE TYPE (H): ICD-10-CM

## 2020-07-23 DIAGNOSIS — Z95.2 PRESENCE OF PROSTHETIC HEART VALVE: ICD-10-CM

## 2020-07-23 DIAGNOSIS — I10 ESSENTIAL HYPERTENSION: ICD-10-CM

## 2020-07-23 DIAGNOSIS — J84.10 PULMONARY FIBROSIS (H): ICD-10-CM

## 2020-07-23 PROCEDURE — 99214 OFFICE O/P EST MOD 30 MIN: CPT | Performed by: FAMILY MEDICINE

## 2020-07-23 ASSESSMENT — PAIN SCALES - GENERAL: PAINLEVEL: NO PAIN (0)

## 2020-07-23 ASSESSMENT — MIFFLIN-ST. JEOR: SCORE: 1822.09

## 2021-03-17 ENCOUNTER — APPOINTMENT (RX ONLY)
Dept: URBAN - METROPOLITAN AREA CLINIC 114 | Facility: CLINIC | Age: 81
Setting detail: DERMATOLOGY
End: 2021-03-17

## 2021-03-17 DIAGNOSIS — L21.8 OTHER SEBORRHEIC DERMATITIS: ICD-10-CM

## 2021-03-17 DIAGNOSIS — L82.0 INFLAMED SEBORRHEIC KERATOSIS: ICD-10-CM

## 2021-03-17 DIAGNOSIS — L82.1 OTHER SEBORRHEIC KERATOSIS: ICD-10-CM

## 2021-03-17 DIAGNOSIS — L85.3 XEROSIS CUTIS: ICD-10-CM

## 2021-03-17 DIAGNOSIS — L57.0 ACTINIC KERATOSIS: ICD-10-CM

## 2021-03-17 DIAGNOSIS — D18.0 HEMANGIOMA: ICD-10-CM

## 2021-03-17 DIAGNOSIS — L81.4 OTHER MELANIN HYPERPIGMENTATION: ICD-10-CM

## 2021-03-17 PROBLEM — D18.01 HEMANGIOMA OF SKIN AND SUBCUTANEOUS TISSUE: Status: ACTIVE | Noted: 2021-03-17

## 2021-03-17 PROCEDURE — ? LIQUID NITROGEN

## 2021-03-17 PROCEDURE — 99213 OFFICE O/P EST LOW 20 MIN: CPT | Mod: 25

## 2021-03-17 PROCEDURE — 17110 DESTRUCTION B9 LES UP TO 14: CPT

## 2021-03-17 PROCEDURE — ? TREATMENT REGIMEN

## 2021-03-17 PROCEDURE — 17000 DESTRUCT PREMALG LESION: CPT | Mod: 59

## 2021-03-17 PROCEDURE — 17003 DESTRUCT PREMALG LES 2-14: CPT | Mod: 59

## 2021-03-17 PROCEDURE — ? SUNSCREEN RECOMMENDATIONS

## 2021-03-17 PROCEDURE — ? COUNSELING

## 2021-03-17 ASSESSMENT — LOCATION SIMPLE DESCRIPTION DERM
LOCATION SIMPLE: CHEST
LOCATION SIMPLE: LEFT FOREARM
LOCATION SIMPLE: RIGHT KNEE
LOCATION SIMPLE: LEFT CHEEK
LOCATION SIMPLE: RIGHT NOSE
LOCATION SIMPLE: LEFT PRETIBIAL REGION
LOCATION SIMPLE: RIGHT THIGH
LOCATION SIMPLE: RIGHT WRIST
LOCATION SIMPLE: RIGHT FOREARM
LOCATION SIMPLE: LEFT THIGH
LOCATION SIMPLE: NOSE
LOCATION SIMPLE: RIGHT HAND
LOCATION SIMPLE: UPPER BACK
LOCATION SIMPLE: ABDOMEN
LOCATION SIMPLE: RIGHT PRETIBIAL REGION

## 2021-03-17 ASSESSMENT — LOCATION DETAILED DESCRIPTION DERM
LOCATION DETAILED: LEFT PROXIMAL PRETIBIAL REGION
LOCATION DETAILED: EPIGASTRIC SKIN
LOCATION DETAILED: RIGHT ANTERIOR DISTAL THIGH
LOCATION DETAILED: INFERIOR THORACIC SPINE
LOCATION DETAILED: RIGHT PROXIMAL PRETIBIAL REGION
LOCATION DETAILED: NASAL DORSUM
LOCATION DETAILED: RIGHT DISTAL PRETIBIAL REGION
LOCATION DETAILED: LEFT SUPERIOR MEDIAL MALAR CHEEK
LOCATION DETAILED: RIGHT LATERAL DORSAL WRIST
LOCATION DETAILED: RIGHT NASAL SIDEWALL
LOCATION DETAILED: LEFT PROXIMAL DORSAL FOREARM
LOCATION DETAILED: RIGHT PROXIMAL DORSAL FOREARM
LOCATION DETAILED: RIGHT RADIAL DORSAL HAND
LOCATION DETAILED: RIGHT KNEE
LOCATION DETAILED: SUPERIOR THORACIC SPINE
LOCATION DETAILED: UPPER STERNUM
LOCATION DETAILED: LEFT ANTERIOR DISTAL THIGH

## 2021-03-17 ASSESSMENT — LOCATION ZONE DERM
LOCATION ZONE: LEG
LOCATION ZONE: FACE
LOCATION ZONE: NOSE
LOCATION ZONE: HAND
LOCATION ZONE: TRUNK
LOCATION ZONE: ARM

## 2021-03-17 NOTE — PROCEDURE: LIQUID NITROGEN
Duration Of Freeze Thaw-Cycle (Seconds): 0
Render Note In Bullet Format When Appropriate: No
Detail Level: Simple
Number Of Freeze-Thaw Cycles: 1 freeze-thaw cycle
Post-Care Instructions: I reviewed with the patient in detail post-care instructions. Patient is to wear sunprotection, and avoid picking at any of the treated lesions. Pt may apply Vaseline to crusted or scabbing areas.
Consent: The patient's consent was obtained including but not limited to risks of crusting, scabbing, blistering, scarring, darker or lighter pigmentary change, recurrence, incomplete removal and infection.
Medical Necessity Information: It is in your best interest to select a reason for this procedure from the list below. All of these items fulfill various CMS LCD requirements except the new and changing color options.
Medical Necessity Clause: This procedure was medically necessary because the lesions that were treated were:
Detail Level: Detailed

## 2021-03-17 NOTE — PROCEDURE: TREATMENT REGIMEN
Initiate Treatment: Mild soaps such as Dove or Cetaphil, Amlactin or Cerave bid
Detail Level: Zone
Otc Regimen: Hydrocortisone cream.

## 2021-04-11 ENCOUNTER — HEALTH MAINTENANCE LETTER (OUTPATIENT)
Age: 81
End: 2021-04-11

## 2021-06-30 ENCOUNTER — TELEPHONE (OUTPATIENT)
Dept: SLEEP MEDICINE | Facility: CLINIC | Age: 81
End: 2021-06-30

## 2021-06-30 NOTE — TELEPHONE ENCOUNTER
Reason for call:  Other   Patient called regarding (reason for call): appointment  Additional comments: Pt calling to schedule appointment for Sleep Study. Had Consult 8/26/19    Phone number to reach patient: [phone]       Best Time:  any    Can we leave a detailed message on this number?  YES    Travel screening: Not Applicable

## 2021-07-01 NOTE — TELEPHONE ENCOUNTER
Patient has not been seen since 2019. He would need another appointment to discuss a sleep study. LVMTCB.

## 2021-07-15 ENCOUNTER — APPOINTMENT (OUTPATIENT)
Dept: MRI IMAGING | Facility: CLINIC | Age: 81
End: 2021-07-15
Attending: EMERGENCY MEDICINE
Payer: MEDICARE

## 2021-07-15 ENCOUNTER — HOSPITAL ENCOUNTER (EMERGENCY)
Facility: CLINIC | Age: 81
Discharge: HOME | End: 2021-07-15
Attending: EMERGENCY MEDICINE | Admitting: EMERGENCY MEDICINE
Payer: MEDICARE

## 2021-07-15 ENCOUNTER — APPOINTMENT (OUTPATIENT)
Dept: CT IMAGING | Facility: CLINIC | Age: 81
End: 2021-07-15
Attending: EMERGENCY MEDICINE
Payer: MEDICARE

## 2021-07-15 VITALS
RESPIRATION RATE: 12 BRPM | SYSTOLIC BLOOD PRESSURE: 175 MMHG | WEIGHT: 236 LBS | HEART RATE: 76 BPM | BODY MASS INDEX: 28.73 KG/M2 | TEMPERATURE: 98 F | DIASTOLIC BLOOD PRESSURE: 98 MMHG | OXYGEN SATURATION: 97 %

## 2021-07-15 DIAGNOSIS — R26.89 IMBALANCE: ICD-10-CM

## 2021-07-15 LAB
ANION GAP SERPL CALCULATED.3IONS-SCNC: 8 MMOL/L (ref 3–14)
APTT PPP: 38 SECONDS (ref 22–38)
BASOPHILS # BLD AUTO: 0 10E3/UL (ref 0–0.2)
BASOPHILS NFR BLD AUTO: 1 %
BUN SERPL-MCNC: 15 MG/DL (ref 7–30)
CALCIUM SERPL-MCNC: 9.1 MG/DL (ref 8.5–10.1)
CHLORIDE BLD-SCNC: 110 MMOL/L (ref 94–109)
CO2 SERPL-SCNC: 24 MMOL/L (ref 20–32)
CREAT SERPL-MCNC: 1.06 MG/DL (ref 0.66–1.25)
EOSINOPHIL # BLD AUTO: 0.2 10E3/UL (ref 0–0.7)
EOSINOPHIL NFR BLD AUTO: 3 %
ERYTHROCYTE [DISTWIDTH] IN BLOOD BY AUTOMATED COUNT: 13.3 % (ref 10–15)
GFR SERPL CREATININE-BSD FRML MDRD: 65 ML/MIN/1.73M2
GLUCOSE BLD-MCNC: 123 MG/DL (ref 70–99)
HCT VFR BLD AUTO: 42.3 % (ref 40–53)
HGB BLD-MCNC: 13.9 G/DL (ref 13.3–17.7)
IMM GRANULOCYTES # BLD: 0 10E3/UL
IMM GRANULOCYTES NFR BLD: 1 %
INR PPP: 1.11 (ref 0.85–1.15)
LYMPHOCYTES # BLD AUTO: 1.2 10E3/UL (ref 0.8–5.3)
LYMPHOCYTES NFR BLD AUTO: 23 %
MCH RBC QN AUTO: 31.4 PG (ref 26.5–33)
MCHC RBC AUTO-ENTMCNC: 32.9 G/DL (ref 31.5–36.5)
MCV RBC AUTO: 96 FL (ref 78–100)
MONOCYTES # BLD AUTO: 0.5 10E3/UL (ref 0–1.3)
MONOCYTES NFR BLD AUTO: 9 %
NEUTROPHILS # BLD AUTO: 3.3 10E3/UL (ref 1.6–8.3)
NEUTROPHILS NFR BLD AUTO: 63 %
NRBC # BLD AUTO: 0 10E3/UL
NRBC BLD AUTO-RTO: 0 /100
PLATELET # BLD AUTO: 200 10E3/UL (ref 150–450)
POTASSIUM BLD-SCNC: 4 MMOL/L (ref 3.4–5.3)
RBC # BLD AUTO: 4.43 10E6/UL (ref 4.4–5.9)
SODIUM SERPL-SCNC: 142 MMOL/L (ref 133–144)
TROPONIN I SERPL-MCNC: 0.04 UG/L (ref 0–0.04)
WBC # BLD AUTO: 5.2 10E3/UL (ref 4–11)

## 2021-07-15 PROCEDURE — 99285 EMERGENCY DEPT VISIT HI MDM: CPT | Mod: 25 | Performed by: EMERGENCY MEDICINE

## 2021-07-15 PROCEDURE — 93010 ELECTROCARDIOGRAM REPORT: CPT | Performed by: EMERGENCY MEDICINE

## 2021-07-15 PROCEDURE — 70450 CT HEAD/BRAIN W/O DYE: CPT | Mod: MG

## 2021-07-15 PROCEDURE — 84484 ASSAY OF TROPONIN QUANT: CPT | Performed by: EMERGENCY MEDICINE

## 2021-07-15 PROCEDURE — 85730 THROMBOPLASTIN TIME PARTIAL: CPT | Performed by: EMERGENCY MEDICINE

## 2021-07-15 PROCEDURE — 70551 MRI BRAIN STEM W/O DYE: CPT

## 2021-07-15 PROCEDURE — 250N000011 HC RX IP 250 OP 636: Performed by: EMERGENCY MEDICINE

## 2021-07-15 PROCEDURE — 250N000009 HC RX 250: Performed by: EMERGENCY MEDICINE

## 2021-07-15 PROCEDURE — 85025 COMPLETE CBC W/AUTO DIFF WBC: CPT | Performed by: EMERGENCY MEDICINE

## 2021-07-15 PROCEDURE — 36415 COLL VENOUS BLD VENIPUNCTURE: CPT | Performed by: EMERGENCY MEDICINE

## 2021-07-15 PROCEDURE — 36592 COLLECT BLOOD FROM PICC: CPT | Performed by: EMERGENCY MEDICINE

## 2021-07-15 PROCEDURE — 80048 BASIC METABOLIC PNL TOTAL CA: CPT | Performed by: EMERGENCY MEDICINE

## 2021-07-15 PROCEDURE — 93005 ELECTROCARDIOGRAM TRACING: CPT | Performed by: EMERGENCY MEDICINE

## 2021-07-15 PROCEDURE — 85610 PROTHROMBIN TIME: CPT | Performed by: EMERGENCY MEDICINE

## 2021-07-15 PROCEDURE — 70496 CT ANGIOGRAPHY HEAD: CPT | Mod: MG,XS

## 2021-07-15 RX ORDER — IOPAMIDOL 755 MG/ML
70 INJECTION, SOLUTION INTRAVASCULAR ONCE
Status: COMPLETED | OUTPATIENT
Start: 2021-07-15 | End: 2021-07-15

## 2021-07-15 RX ADMIN — SODIUM CHLORIDE 100 ML: 9 INJECTION, SOLUTION INTRAVENOUS at 19:31

## 2021-07-15 RX ADMIN — IOPAMIDOL 70 ML: 755 INJECTION, SOLUTION INTRAVENOUS at 19:31

## 2021-07-15 ASSESSMENT — VISUAL ACUITY: OU: NORMAL ACUITY;BASELINE

## 2021-07-16 NOTE — ED NOTES
Per [facility], there are no beds available at the [facility] at this time - this patient is on a wait list and likely will not have a bed until tomorrow.

## 2021-07-16 NOTE — ED TRIAGE NOTES
"Pt was at [facility] in the check out line  Suddenly developed right-sided weakness, unable to walk, started to fall over and grabbed onto the cart, \"my feet wouldn't follow me\"  Clung onto the cart as he went down, then sat in a stool    Skipped his xarelto today because he didn't have breakfast today    Pt brought by EMS and directed immediately to CT  "

## 2021-07-16 NOTE — ED TRIAGE NOTES
Per EMS pt in [facility] approx 20 min ago with sudden onset of dizziness, weakness, unable to move legs.  +HA, facial droop and R arm drift.      Rapid neuro assessment did on EMS cot and pt transported to CT scan.  BG completed and Labs were drawn from existing 18g IV in R arm.

## 2021-07-16 NOTE — DISCHARGE INSTRUCTIONS
Continue current medications    Recheck for headache, focal neurologic change or any other concern

## 2021-07-16 NOTE — ED PROVIDER NOTES
History     Chief Complaint   Patient presents with     Stroke Symptoms     right sided weakness suddenly at [facility]  Jameel Barrett is a 81 year old male who presents by EMS from [facility] about 20-minute minutes prior to presentation.  He was looking at the lazy Susan with the bags on it and suddenly became unable to stand upright with severe imbalance.  Denies associated near syncope, diaphoresis, shortness of air, palpitations, chest pain, headache, vertigo.  Symptoms of essentially resolved by the time he got here.  EMS reported right facial droop and right upper extremity drift, this is not seen upon arrival.  Reported history of TIA, history is per patient as well as paramedics and chart review.  Currently denies any significant headache, denies visual change difficulty speaking or swallowing.  Denies a numb sensation.  Feels unstable when trying to sit up on his own.  Has history of atrial flutter on rivaroxaban reports compliance with meds.  No medications in route.  Blood sugar 108 in the field.    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  Open heart surgery 1/14/2020 to replace aortic valve  Antibiotic x 1 year  LVEF 50-55%       Need for SBE (subacute bacterial endocarditis) prophylaxis 10/14/2019     Priority: Medium     lifelong       Atrial flutter (H) 10/07/2019     Priority: Medium     Developed post op after TAVR 10/7/2019  Started on beta blocker, spontaneously converted  Xarelto/asa lifelong       Presence of prosthetic heart valve 10/07/2019     Priority: Medium     29 mm Lakshmi S3 via TAVR 10/7/19       Age-related nuclear cataract of both eyes 09/19/2019      Priority: Medium     Hypertensive kidney disease, stage III 09/09/2019     Priority: Medium     Long term (current) use of anticoagulants 09/09/2019     Priority: Medium     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 No treatments have improved this chronic condition. Has PRN albuterol. Following with PCP.  Overview:   Last Assessment & Plan:   PFTs 12/28/17 reveal an FEV1 of 60%, moderate to severe restriction of lung function, and the patient was unable to complete exam. No treatments have improved this chronic condition. Has PRN albuterol. Following with PCP.       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 According to patient, he has failed therapies to treat and improve, this condition is being monitored and followed by the [facility].  Overview:     ATTR amyloid cardiac biopsy July 2016    Echocardiogram June 2017, moderate to severe aortic stenosis, 0.9-1.0 cm , IVS 2.2, LVPW 1.8, peak gradient 49.6, mean gradient 27.3, restrictive pattern    Last Assessment & Plan:   ATTR amyloid cardiac biopsy performed July 2016. According to patient, he has failed therapies to treat and improve, this condition is being monitored and followed by the [facility].    Cardiology saw patient preoperatively:  Coronary angiography performed elsewhere last  month is reassuring. Invasive and noninvasive evaluation of his aortic valve again reassuring. He is able to complete at least 4 METS based on his history and our objective testing today. Accordingly from my standpoint he ought to be okay proceeding with noncardiac orthopedic surgery. I would nonetheless recommend that a cardiac anesthesiologist be involved in his care particularly in the setting of cardiac amyloid physiology. Massive fluid shifts, hypotension and hypovolemia ought to be avoided.     Despite his amyloid physiology, his coronary artery disease appears to have warranted prescription of beta-blocker therapy in the past. He is currently tolerating this very well. This ought to be continued perioperatively. Should there be any suggestion of symptomatic hypotension or orthostasis I would have a low threshold for its discontinuation. I do not expect this due to his preserved stroke volume index.       Aortic valve stenosis 06/09/2016     Priority: Medium     Added automatically from request for surgery [id number]  Now s/p TAVR 10/7/2019       Coronary arteriosclerosis in native artery 03/12/2014     Priority: Medium     S/p stent x 2 in [place], 2000 and 2001    Overview:   Drug-eluting stents to LAD 2007 and 2008 Catheterization 2010: LAD 30%, patent stents, diagonal one 30%, circumflex 30%, RCA 55% Nuclear stress test March 2013: Sascha protocol 8 minutes 58 seconds; no ischemia; LVEF 54% Cath Nov 2014: patent stents; mild CADThe patient continues to have what is felt to be an anginal equivalent with exertional jaw pain that is relieved by rest.  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  Moderate aortic stenosis.  2.  Severe cardiomyopathy, nonischemic.35%  3.  Mild-to-moderate coronary artery disease. RCA 60-70% LAD stent patent  4.  Severe pulmonary hypertension with elevated  wedge. 65/22  5. No evidence of constriction    meds adjusted:  We will discontinue atenolol and   discontinue Naprosyn and instead use Coreg 3.125 mg twice a day, increase Demadex to   5 mg 2 tablets daily, and add Aldactone 25 mg 1/2 tablet daily.         Last Assessment & Plan:   Catheterization 5/2018 demonstrates moderate aortic stenosis, severe cardiomyopathy nonischemic 35%, mild to moderate coronary artery disease. RCA 60-70% nonobstructive, LAD stent patent. Severe pulmonary hypertension 65/22. Continue guideline directed medical therapy.  Overview:   Overview:   Drug-eluting stents to LAD 2007 and 2008 Catheterization 2010: LAD 30%, patent stents, diagonal one 30%, circumflex 30%, RCA 55% Nuclear stress test March 2013: Sascha protocol 8 minutes 58 seconds; no ischemia; LVEF 54% Cath Nov 2014: patent stents; mild CADThe patient continues to have what is felt to be an anginal equivalent with exertional jaw pain that is relieved by rest.  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  Moderate aortic stenosis.  2.  Severe cardiomyopathy, nonischemic.35%  3.  Mild-to-moderate coronary artery disease. RCA 60-70% LAD stent patent  4.  Severe pulmonary hypertension with elevated wedge. 65/22  5. No evidence of constriction    meds adjusted:  We will discontinue atenolol and   discontinue Naprosyn and instead use Coreg 3.125 mg twice a day, increase Demadex to   5 mg 2 tablets daily, and add Aldactone 25 mg 1/2 tablet daily.       Last Assessment & Plan:   Catheterization 5/2018 demonstrates moderate aortic stenosis, severe cardiomyopathy nonischemic 35%, mild to moderate coronary artery disease. RCA 60-70% nonobstructive, LAD stent patent. Severe pulmonary hypertension 65/22. Continue guideline directed medical therapy.    Overview:   ATTR amyloid cardiac biopsy July 2016    Echocardiogram  June 2017, moderate to severe aortic stenosis, 0.9-1.0 cm , IVS 2.2, LVPW 1.8, peak gradient 49.6, mean gradient 27.3, restrictive pattern    Last Assessment & Plan:   ATTR amyloid cardiac biopsy performed July 2016. According to patient, he has failed therapies to treat and improve, this condition is being monitored and followed by the [facility].  Overview:     ATTR amyloid cardiac biopsy July 2016    Echocardiogram June 2017, moderate to severe aortic stenosis, 0.9-1.0 cm , IVS 2.2, LVPW 1.8, peak gradient 49.6, mean gradient 27.3, restrictive pattern    Last Assessment & Plan:   ATTR amyloid cardiac biopsy performed July 2016. According to patient, he has failed therapies to treat and improve, this condition is being monitored and followed by the [facility].    Cardiology saw patient preoperatively:  Coronary angiography performed elsewhere last month is reassuring. Invasive and noninvasive evaluation of his aortic valve again reassuring. He is able to complete at least 4 METS based on his history and our objective testing today. Accordingly from my standpoint he ought to be okay proceeding with noncardiac orthopedic surgery. I would nonetheless recommend that a cardiac anesthesiologist be involved in his care particularly in the setting of cardiac amyloid physiology. Massive fluid shifts, hypotension and hypovolemia ought to be avoided.     Despite his amyloid physiology, his coronary artery disease appears to have warranted prescription of beta-blocker therapy in the past. He is currently tolerating this very well. This ought to be continued perioperatively. Should there be any suggestion of symptomatic hypotension or orthostasis I would have a low threshold for its discontinuation. I do not expect this due to his preserved stroke volume index.       Hypertension 03/12/2014     Priority: Medium     Last Assessment & Plan:   Chronic hypertension, clinically stable.  Continue aggressive risk factor  modification and medical management.   Last Assessment & Plan:   Chronic hypertension, clinically stable.  Continue aggressive risk factor modification and medical management.       Ptosis-episodic 06/08/2012     Priority: Medium     Diplopia 06/08/2012     Priority: Medium     Hyperlipidemia LDL goal <100 06/07/2012     Priority: Medium        Past Medical History:    Past Medical History:   Diagnosis Date     Coronary artery disease      Hypertension      Neck pain 6/8/2012     Skin cancer        Past Surgical History:    Past Surgical History:   Procedure Laterality Date     CARDIAC SURGERY  4/8/08      CARDIAC SURGERY  4/12/07     CV TRANSCATHETER VALVE PLACEMENT  10/07/2019    at [place]     JOINT REPLACEMENT, HIP RT/LT Right 07/30/2018    Joint Replacement Hip RT/LT     ORTHOPEDIC SURGERY  10/08    LT hip replacment       Family History:    Family History   Problem Relation Age of Onset     Dementia Mother      Diabetes Father      Hypertension Father        Social History:  Marital Status:   [2]  Social History     Tobacco Use     Smoking status: Never Smoker     Smokeless tobacco: Never Used   Substance Use Topics     Alcohol use: Yes     Comment: occ     Drug use: Never        Medications:    acetaminophen (TYLENOL) 500 MG tablet  atorvastatin (LIPITOR) 20 MG tablet  calcipotriene (DOVONEX) 0.005 % external cream  cephALEXin (KEFLEX) 500 MG capsule  fluorouracil (EFUDEX) 5 % external cream  metoprolol tartrate (LOPRESSOR) 25 MG tablet  rivaroxaban ANTICOAGULANT (XARELTO) 20 MG TABS tablet  torsemide (DEMADEX) 20 MG tablet          Review of Systems  All other systems reviewed and are negative.    Physical Exam   BP: (!) 180/82  Pulse: 77  Temp: 98.2  F (36.8  C)  Resp: 16  Weight: 107 kg (236 lb)  SpO2: 99 %      Physical Exam  Nontoxic-appearing no respiratory distress alert and oriented x3. GCS 15 on arrival, throughout stay and at discharge.    Head atraumatic normocephalic    Cranial nerves; vision  baseline fields intact, PERRL, EOMI, facial sensation intact to light touch, facial muscle tone intact and symmetrical, hearing grossly intact,swallowing without difficulty, voice baseline and normal, SCM  strength intact, tongue protrudes midline.  Palatal elevation symmetric    Oropharynx moist without lesions or erythema.    Neck supple full active painless range of motion     Lungs clear to auscultation no rales rhonchi or wheezes    Heart regular no murmur S3 or rub    Abdomen soft nontender bowel sounds positive no masses or HSM    Strength and sensation intact throughout the extremities, skin clear from rash or lesion.    Extremities are atraumatic, full painless active range of motion all joints    Mild truncal ataxia when sitting up    [employer] Stroke Scale  Exam Interval: Baseline   Score    Level of consciousness: (0)   Alert, keenly responsive    LOC questions: (0)   Answers both questions correctly    LOC commands: (0)   Performs both tasks correctly    Best gaze: (0)   Normal    Visual: (0)   No visual loss    Facial palsy: (0)   Normal symmetrical movements    Motor arm (left): (0)   No drift    Motor arm (right): (0)   No drift    Motor leg (left): (0)   No drift    Motor leg (right): (0)   No drift    Limb ataxia: (0)   Absent    Sensory: (0)   Normal- no sensory loss    Best language: (0)   Normal- no aphasia    Dysarthria: (0)   Normal    Extinction and inattention: (0)   No abnormality        Total Score:  0       ED Course    Patient seen in room 1 upon arrival by EMS.  Tier 1 code stroke called prior to arrival.  Chronic anticoagulation rivaroxaban secondary to atrial flutter.  Reported TIA in the past.  20 minutes into right sided weakness, facial droop.  Exam here unrevealing with exception of mild truncal ataxia.  Reviewed with stroke neurology.        Procedures              Critical Care time:  none         Results for orders placed or performed during the hospital  encounter of 07/15/21   CT Head w/o Contrast     Status: None    Narrative    EXAM: CTA  HEAD NECK WITH CONTRAST, CT HEAD W/O CONTRAST  LOCATION: [facility]  DATE/TIME: 7/15/2021 7:33 PM    INDICATION: Right-sided facial droop and weakness.  COMPARISON: None.  CONTRAST: 70 ml's isovue  TECHNIQUE: Head and neck CT angiogram with IV contrast. Noncontrast head CT followed by axial helical CT images of the head and neck vessels obtained during the arterial phase of intravenous contrast administration. Axial 2D reconstructed images and   multiplanar 3D MIP reconstructed images of the head and neck vessels were performed by the technologist. Dose reduction techniques were used. All stenosis measurements made according to NASCET criteria unless otherwise specified.    FINDINGS:   NONCONTRAST HEAD CT:   INTRACRANIAL CONTENTS: No intracranial hemorrhage, extraaxial collection, or mass effect.  No CT evidence of acute infarct. Mild presumed chronic small vessel ischemic changes. Mild generalized volume loss. No hydrocephalus.     VISUALIZED ORBITS/SINUSES/MASTOIDS: No intraorbital abnormality. No paranasal sinus mucosal disease. No middle ear or mastoid effusion.    BONES/SOFT TISSUES: No acute abnormality.    HEAD CTA:  Bilateral distal internal carotid arteries, bilateral MCA, and bilateral DOMINGO are patent. Left vertebral artery dominance with patent distal vertebral arteries, basilar artery, and bilateral PCA. No aneurysm. No high-flow vascular malformation.    NECK CTA:  Three-vessel aortic arch. Bilateral common carotid arteries are patent. Mild narrowing at the proximal right and proximal left internal carotid artery. Left vertebral artery dominance with no significant vertebral artery stenosis. Multilevel degenerative   change in the cervical spine. Remainder negative.      Impression    IMPRESSION:   HEAD CT:  1.  Mild age-related changes, as above, with no acute intracranial abnormality.    HEAD CTA:    1.  No significant intracranial stenosis. No aneurysm and no high-flow vascular malformation.    NECK CTA:  1.  Mild narrowing of the proximal internal carotid arteries bilaterally with no high-grade stenosis of the neck vessels.    Head CT discussed with Dr. Sanz by phone at 1940 hours. Head and neck CTA discussed with Dr. Sanz by phone at 2010 hours on 07/15/2021.   CTA Head Neck with Contrast     Status: None    Narrative    EXAM: CTA  HEAD NECK WITH CONTRAST, CT HEAD W/O CONTRAST  LOCATION: [facility]  DATE/TIME: 7/15/2021 7:33 PM    INDICATION: Right-sided facial droop and weakness.  COMPARISON: None.  CONTRAST: 70 ml's isovue  TECHNIQUE: Head and neck CT angiogram with IV contrast. Noncontrast head CT followed by axial helical CT images of the head and neck vessels obtained during the arterial phase of intravenous contrast administration. Axial 2D reconstructed images and   multiplanar 3D MIP reconstructed images of the head and neck vessels were performed by the technologist. Dose reduction techniques were used. All stenosis measurements made according to NASCET criteria unless otherwise specified.    FINDINGS:   NONCONTRAST HEAD CT:   INTRACRANIAL CONTENTS: No intracranial hemorrhage, extraaxial collection, or mass effect.  No CT evidence of acute infarct. Mild presumed chronic small vessel ischemic changes. Mild generalized volume loss. No hydrocephalus.     VISUALIZED ORBITS/SINUSES/MASTOIDS: No intraorbital abnormality. No paranasal sinus mucosal disease. No middle ear or mastoid effusion.    BONES/SOFT TISSUES: No acute abnormality.    HEAD CTA:  Bilateral distal internal carotid arteries, bilateral MCA, and bilateral DOMINGO are patent. Left vertebral artery dominance with patent distal vertebral arteries, basilar artery, and bilateral PCA. No aneurysm. No high-flow vascular malformation.    NECK CTA:  Three-vessel aortic arch. Bilateral common carotid arteries are patent. Mild  narrowing at the proximal right and proximal left internal carotid artery. Left vertebral artery dominance with no significant vertebral artery stenosis. Multilevel degenerative   change in the cervical spine. Remainder negative.      Impression    IMPRESSION:   HEAD CT:  1.  Mild age-related changes, as above, with no acute intracranial abnormality.    HEAD CTA:   1.  No significant intracranial stenosis. No aneurysm and no high-flow vascular malformation.    NECK CTA:  1.  Mild narrowing of the proximal internal carotid arteries bilaterally with no high-grade stenosis of the neck vessels.    Head CT discussed with Dr. Sanz by phone at 1940 hours. Head and neck CTA discussed with Dr. Sanz by phone at 2010 hours on 07/15/2021.   MR Brain w/o Contrast     Status: None    Narrative    MRI OF THE BRAIN WITHOUT CONTRAST 7/15/2021 9:39 PM     COMPARISON: Head CT same day.    HISTORY: Right facial droop and weakness.     TECHNIQUE:   Brain: Axial diffusion-weighted with ADC map, T2-weighted with fat  saturation, T1-weighted and turboFLAIR and sagittal T1-weighted images  of the brain were obtained without intravenous contrast.     FINDINGS: There is mild diffuse cerebral volume loss. There are a few  tiny scattered focal areas of abnormal T2 signal hyperintensity in the  cerebral white matter bilaterally that are consistent with sequela of  chronic small vessel ischemic disease.     The ventricles and basal cisterns are within normal limits in  configuration given the degree of cerebral volume loss. There is no  midline shift. There are no extra-axial fluid collections. There is no  evidence for stroke or acute intracranial hemorrhage.     There is no sinusitis or mastoiditis.       Impression    IMPRESSION: Diffuse cerebral volume loss and cerebral white matter  changes consistent with chronic small vessel ischemic disease. No  evidence for acute intracranial pathology.    VALENCIA BAUMAN MD         SYSTEM ID:   [id number]   Basic metabolic panel     Status: Abnormal   Result Value Ref Range    Sodium 142 133 - 144 mmol/L    Potassium 4.0 3.4 - 5.3 mmol/L    Chloride 110 (H) 94 - 109 mmol/L    Carbon Dioxide (CO2) 24 20 - 32 mmol/L    Anion Gap 8 3 - 14 mmol/L    Urea Nitrogen 15 7 - 30 mg/dL    Creatinine 1.06 0.66 - 1.25 mg/dL    Calcium 9.1 8.5 - 10.1 mg/dL    Glucose 123 (H) 70 - 99 mg/dL    GFR Estimate 65 >60 mL/min/1.73m2   INR     Status: Normal   Result Value Ref Range    INR 1.11 0.85 - 1.15   Partial thromboplastin time     Status: Normal   Result Value Ref Range    aPTT 38 22 - 38 Seconds   Troponin I     Status: Normal   Result Value Ref Range    Troponin I 0.037 0.000 - 0.045 ug/L   CBC with platelets and differential     Status: None   Result Value Ref Range    WBC Count 5.2 4.0 - 11.0 10e3/uL    RBC Count 4.43 4.40 - 5.90 10e6/uL    Hemoglobin 13.9 13.3 - 17.7 g/dL    Hematocrit 42.3 40.0 - 53.0 %    MCV 96 78 - 100 fL    MCH 31.4 26.5 - 33.0 pg    MCHC 32.9 31.5 - 36.5 g/dL    RDW 13.3 10.0 - 15.0 %    Platelet Count 200 150 - 450 10e3/uL    % Neutrophils 63 %    % Lymphocytes 23 %    % Monocytes 9 %    % Eosinophils 3 %    % Basophils 1 %    % Immature Granulocytes 1 %    NRBCs per 100 WBC 0 <1 /100    Absolute Neutrophils 3.3 1.6 - 8.3 10e3/uL    Absolute Lymphocytes 1.2 0.8 - 5.3 10e3/uL    Absolute Monocytes 0.5 0.0 - 1.3 10e3/uL    Absolute Eosinophils 0.2 0.0 - 0.7 10e3/uL    Absolute Basophils 0.0 0.0 - 0.2 10e3/uL    Absolute Immature Granulocytes 0.0 <=0.0 10e3/uL    Absolute NRBCs 0.0 10e3/uL   CBC with Platelets & Differential     Status: None    Narrative    The following orders were created for panel order CBC with Platelets & Differential.  Procedure                               Abnormality         Status                     ---------                               -----------         ------                     CBC with platelets and d...[[id number]]                      Final result                  Please view results for these tests on the individual orders.              Results for orders placed or performed during the hospital encounter of 07/15/21 (from the past 24 hour(s))   CBC with Platelets & Differential    Narrative    The following orders were created for panel order CBC with Platelets & Differential.  Procedure                               Abnormality         Status                     ---------                               -----------         ------                     CBC with platelets and d...[[id number]]                      Final result                 Please view results for these tests on the individual orders.   Basic metabolic panel   Result Value Ref Range    Sodium 142 133 - 144 mmol/L    Potassium 4.0 3.4 - 5.3 mmol/L    Chloride 110 (H) 94 - 109 mmol/L    Carbon Dioxide (CO2) 24 20 - 32 mmol/L    Anion Gap 8 3 - 14 mmol/L    Urea Nitrogen 15 7 - 30 mg/dL    Creatinine 1.06 0.66 - 1.25 mg/dL    Calcium 9.1 8.5 - 10.1 mg/dL    Glucose 123 (H) 70 - 99 mg/dL    GFR Estimate 65 >60 mL/min/1.73m2   INR   Result Value Ref Range    INR 1.11 0.85 - 1.15   Partial thromboplastin time   Result Value Ref Range    aPTT 38 22 - 38 Seconds   Troponin I   Result Value Ref Range    Troponin I 0.037 0.000 - 0.045 ug/L   CBC with platelets and differential   Result Value Ref Range    WBC Count 5.2 4.0 - 11.0 10e3/uL    RBC Count 4.43 4.40 - 5.90 10e6/uL    Hemoglobin 13.9 13.3 - 17.7 g/dL    Hematocrit 42.3 40.0 - 53.0 %    MCV 96 78 - 100 fL    MCH 31.4 26.5 - 33.0 pg    MCHC 32.9 31.5 - 36.5 g/dL    RDW 13.3 10.0 - 15.0 %    Platelet Count 200 150 - 450 10e3/uL    % Neutrophils 63 %    % Lymphocytes 23 %    % Monocytes 9 %    % Eosinophils 3 %    % Basophils 1 %    % Immature Granulocytes 1 %    NRBCs per 100 WBC 0 <1 /100    Absolute Neutrophils 3.3 1.6 - 8.3 10e3/uL    Absolute Lymphocytes 1.2 0.8 - 5.3 10e3/uL    Absolute Monocytes 0.5 0.0 - 1.3 10e3/uL    Absolute Eosinophils 0.2 0.0 - 0.7  10e3/uL    Absolute Basophils 0.0 0.0 - 0.2 10e3/uL    Absolute Immature Granulocytes 0.0 <=0.0 10e3/uL    Absolute NRBCs 0.0 10e3/uL   CT Head w/o Contrast    Narrative    EXAM: CTA  HEAD NECK WITH CONTRAST, CT HEAD W/O CONTRAST  LOCATION: [facility]  DATE/TIME: 7/15/2021 7:33 PM    INDICATION: Right-sided facial droop and weakness.  COMPARISON: None.  CONTRAST: 70 ml's isovue  TECHNIQUE: Head and neck CT angiogram with IV contrast. Noncontrast head CT followed by axial helical CT images of the head and neck vessels obtained during the arterial phase of intravenous contrast administration. Axial 2D reconstructed images and   multiplanar 3D MIP reconstructed images of the head and neck vessels were performed by the technologist. Dose reduction techniques were used. All stenosis measurements made according to NASCET criteria unless otherwise specified.    FINDINGS:   NONCONTRAST HEAD CT:   INTRACRANIAL CONTENTS: No intracranial hemorrhage, extraaxial collection, or mass effect.  No CT evidence of acute infarct. Mild presumed chronic small vessel ischemic changes. Mild generalized volume loss. No hydrocephalus.     VISUALIZED ORBITS/SINUSES/MASTOIDS: No intraorbital abnormality. No paranasal sinus mucosal disease. No middle ear or mastoid effusion.    BONES/SOFT TISSUES: No acute abnormality.    HEAD CTA:  Bilateral distal internal carotid arteries, bilateral MCA, and bilateral DOMINGO are patent. Left vertebral artery dominance with patent distal vertebral arteries, basilar artery, and bilateral PCA. No aneurysm. No high-flow vascular malformation.    NECK CTA:  Three-vessel aortic arch. Bilateral common carotid arteries are patent. Mild narrowing at the proximal right and proximal left internal carotid artery. Left vertebral artery dominance with no significant vertebral artery stenosis. Multilevel degenerative   change in the cervical spine. Remainder negative.      Impression    IMPRESSION:   HEAD  CT:  1.  Mild age-related changes, as above, with no acute intracranial abnormality.    HEAD CTA:   1.  No significant intracranial stenosis. No aneurysm and no high-flow vascular malformation.    NECK CTA:  1.  Mild narrowing of the proximal internal carotid arteries bilaterally with no high-grade stenosis of the neck vessels.    Head CT discussed with Dr. Sanz by phone at 1940 hours. Head and neck CTA discussed with Dr. aSnz by phone at 2010 hours on 07/15/2021.   CTA Head Neck with Contrast    Narrative    EXAM: CTA  HEAD NECK WITH CONTRAST, CT HEAD W/O CONTRAST  LOCATION: [facility]  DATE/TIME: 7/15/2021 7:33 PM    INDICATION: Right-sided facial droop and weakness.  COMPARISON: None.  CONTRAST: 70 ml's isovue  TECHNIQUE: Head and neck CT angiogram with IV contrast. Noncontrast head CT followed by axial helical CT images of the head and neck vessels obtained during the arterial phase of intravenous contrast administration. Axial 2D reconstructed images and   multiplanar 3D MIP reconstructed images of the head and neck vessels were performed by the technologist. Dose reduction techniques were used. All stenosis measurements made according to NASCET criteria unless otherwise specified.    FINDINGS:   NONCONTRAST HEAD CT:   INTRACRANIAL CONTENTS: No intracranial hemorrhage, extraaxial collection, or mass effect.  No CT evidence of acute infarct. Mild presumed chronic small vessel ischemic changes. Mild generalized volume loss. No hydrocephalus.     VISUALIZED ORBITS/SINUSES/MASTOIDS: No intraorbital abnormality. No paranasal sinus mucosal disease. No middle ear or mastoid effusion.    BONES/SOFT TISSUES: No acute abnormality.    HEAD CTA:  Bilateral distal internal carotid arteries, bilateral MCA, and bilateral DOMINGO are patent. Left vertebral artery dominance with patent distal vertebral arteries, basilar artery, and bilateral PCA. No aneurysm. No high-flow vascular malformation.    NECK  CTA:  Three-vessel aortic arch. Bilateral common carotid arteries are patent. Mild narrowing at the proximal right and proximal left internal carotid artery. Left vertebral artery dominance with no significant vertebral artery stenosis. Multilevel degenerative   change in the cervical spine. Remainder negative.      Impression    IMPRESSION:   HEAD CT:  1.  Mild age-related changes, as above, with no acute intracranial abnormality.    HEAD CTA:   1.  No significant intracranial stenosis. No aneurysm and no high-flow vascular malformation.    NECK CTA:  1.  Mild narrowing of the proximal internal carotid arteries bilaterally with no high-grade stenosis of the neck vessels.    Head CT discussed with Dr. Sanz by phone at 1940 hours. Head and neck CTA discussed with Dr. Sanz by phone at 2010 hours on 07/15/2021.   MR Brain w/o Contrast    Narrative    MRI OF THE BRAIN WITHOUT CONTRAST 7/15/2021 9:39 PM     COMPARISON: Head CT same day.    HISTORY: Right facial droop and weakness.     TECHNIQUE:   Brain: Axial diffusion-weighted with ADC map, T2-weighted with fat  saturation, T1-weighted and turboFLAIR and sagittal T1-weighted images  of the brain were obtained without intravenous contrast.     FINDINGS: There is mild diffuse cerebral volume loss. There are a few  tiny scattered focal areas of abnormal T2 signal hyperintensity in the  cerebral white matter bilaterally that are consistent with sequela of  chronic small vessel ischemic disease.     The ventricles and basal cisterns are within normal limits in  configuration given the degree of cerebral volume loss. There is no  midline shift. There are no extra-axial fluid collections. There is no  evidence for stroke or acute intracranial hemorrhage.     There is no sinusitis or mastoiditis.       Impression    IMPRESSION: Diffuse cerebral volume loss and cerebral white matter  changes consistent with chronic small vessel ischemic disease. No  evidence for acute  intracranial pathology.    VALENCIA BAUMAN MD         SYSTEM ID:  [id number]       Medications   iopamidol (ISOVUE-370) solution 70 mL (70 mLs Intravenous Given 7/15/21 1931)   sodium chloride 0.9 % bag 500mL for CT scan flush use (100 mLs As instructed Given 7/15/21 1931)   EKG normal sinus rhythm rate 72, first-degree AV block, right bundle branch block is new, no acute ST-T wave changes are appreciated, read by Dr. Julio Cesar Sanz    Assessments & Plan (with Medical Decision Making)  81-year-old male with amyloidosis, history of endocarditis with valve replacement, atrial flutter chronic anticoagulation, congestive heart failure, anxiety, aortic valve stenosis, hypertension, hyperlipidemia presents with sudden feeling of imbalance.  EMS reports right facial droop and right upper extremity drift although none appreciated in ED.  Code stroke called, reviewed with Dr. Marrero stroke neurology, CT, CT angiogram head and neck negative for acute finding, MRI brain as above diffuse cerebral volume loss and cerebral white matter change consistent with chronic small vessel ischemic disease no evidence for acute intracranial pathology.  Remained stable throughout stay no further complaint or focal neurologic finding.  Etiology continues to remain undetermined, could be TIA, could be vertigo, no evidence for sepsis, electrolyte derangement.  No evidence for acute coronary syndrome.  Discussed admission, patient prefers discharged home, will monitor closely, no change in meds continue rivaroxaban.  Return criteria reviewed     I have reviewed the nursing notes.    I have reviewed the findings, diagnosis, plan and need for follow up with the patient.       New Prescriptions    No medications on file       Final diagnoses:   Imbalance       7/15/2021   [facility] EMERGENCY DEPT     Julio Cesar Sanz MD  07/15/21 [id number]

## 2021-07-16 NOTE — CONSULTS
"      [facility]    Stroke Telephone Note    I was called by Julio Cesar Sanz Md on 07/15/21 regarding patient Jameel Barrett. The patient is a 81 year old male presenting with sudden onset dizziness and weakness, now reportedly resolved. Currently on Xarelto for atrial flutter seen on monitor.    Stroke Code Data (for stroke code without tele)  Stroke code activated 07/15/21   1925   Stroke provider first response  07/15/21   1930            Last known normal 07/15/21   1800        Time of discovery   (or onset of symptoms) 07/15/21   1800   Head CT read by Stroke Neuro Dr/Provider 07/15/21   1937   Was stroke code de-escalated? Yes 07/15/21 1948  other (see comments) symptoms all resolved     Imaging Findings   No signs of early ischemia    Thrombolytic Treatment   Not given due to minor/isolated/quickly resolving symptoms.    Endovascular Treatment  Not initiated due to absence of proximal vessel occlusion    Impression  Transient ischemic attack    Recommendations   - MRI Brain with and without contrast  - TIA pathway upon discharge, follow up contained within  - Continue home Xarelto, consider change to Eliquis in outpatient if insurance will cover with simliar co-pay     My recommendations are based on the information provided over the phone by Jameel Barrett's in-person providers. They are not intended to replace the clinical judgment of his in-person providers. I was not requested to personally see or examine the patient at this time.    Justice Marrero MD  Vascular Neurology/Neurocritical Care  To page me or covering stroke neurology team member, click here: AMCOM   Choose \"On Call\" tab at top, then search dropdown box for \"Neurology Adult\", select location, press Enter, then look for stroke/neuro ICU/telestroke.        "

## 2021-07-20 ENCOUNTER — OFFICE VISIT (OUTPATIENT)
Dept: FAMILY MEDICINE | Facility: CLINIC | Age: 81
End: 2021-07-20
Payer: MEDICARE

## 2021-07-20 VITALS
HEIGHT: 76 IN | DIASTOLIC BLOOD PRESSURE: 78 MMHG | HEART RATE: 73 BPM | TEMPERATURE: 99.1 F | OXYGEN SATURATION: 97 % | BODY MASS INDEX: 27.62 KG/M2 | SYSTOLIC BLOOD PRESSURE: 130 MMHG | RESPIRATION RATE: 18 BRPM | WEIGHT: 226.8 LBS

## 2021-07-20 DIAGNOSIS — R26.89 IMBALANCE: Primary | ICD-10-CM

## 2021-07-20 PROCEDURE — 99213 OFFICE O/P EST LOW 20 MIN: CPT | Performed by: NURSE PRACTITIONER

## 2021-07-20 RX ORDER — UREA 10 %
500 LOTION (ML) TOPICAL DAILY
COMMUNITY

## 2021-07-20 RX ORDER — POTASSIUM CHLORIDE 750 MG/1
1 TABLET, EXTENDED RELEASE ORAL
COMMUNITY
Start: 2020-02-17

## 2021-07-20 ASSESSMENT — MIFFLIN-ST. JEOR: SCORE: 1825.26

## 2021-07-20 NOTE — PROGRESS NOTES
"    Assessment & Plan     Imbalance    - Physical Therapy Referral; Future             BMI:   Estimated body mass index is 27.61 kg/m  as calculated from the following:    Height as of this encounter: 1.93 m (6' 4\").    Weight as of this encounter: 102.9 kg (226 lb 12.8 oz).       CONSULTATION/REFERRAL to physical therapy    FUTURE APPOINTMENTS:       - Follow-up for annual visit or as needed    No follow-ups on file.    BENY Yeboah CNP  [facility]    Jose Coronel is a 81 year old who presents for the following health issues   HPI     ED/ Followup:    Facility:  [facility]   Date of visit: 7/15/21  Reason for visit: imbalance   Current Status: Patient says he is moderately improving. Better day today. Patient has been wearing compression stockings and seems that they are helping somewhat. Feels tired and weak-not getting enough exercise. Still has some feeling of unsteadiness from time to time.   Had head imaging and labs in ED- reviewed. Has an appointment with Neurology in August at [facility]       Review of Systems   Constitutional, HEENT, cardiovascular, pulmonary, gi and gu systems are negative, except as otherwise noted.      Objective    /78   Pulse 73   Temp 99.1  F (37.3  C) (Tympanic)   Resp 18   Ht 1.93 m (6' 4\")   Wt 102.9 kg (226 lb 12.8 oz)   SpO2 97%   BMI 27.61 kg/m    Body mass index is 27.61 kg/m .  Physical Exam   GENERAL: alert and no distress  EYES: Eyes grossly normal to inspection, PERRL and conjunctivae and sclerae normal  RESP: lungs clear to auscultation - no rales, rhonchi or wheezes  CV: regular rates and rhythm, normal S1 S2, no S3 or S4, no murmur, click or rub and no peripheral edema  MS: no gross musculoskeletal defects noted, no edema  NEURO: Normal strength and tone, sensory exam grossly normal, mentation intact, speech normal, cranial nerves 2-12 intact, DTR's normal and symmetric 2+ and gait normal.  PSYCH: mentation " appears normal, affect normal/bright

## 2021-07-27 ENCOUNTER — OFFICE VISIT (OUTPATIENT)
Dept: FAMILY MEDICINE | Facility: CLINIC | Age: 81
End: 2021-07-27
Payer: MEDICARE

## 2021-07-27 VITALS
HEART RATE: 70 BPM | TEMPERATURE: 97.6 F | OXYGEN SATURATION: 98 % | SYSTOLIC BLOOD PRESSURE: 148 MMHG | RESPIRATION RATE: 14 BRPM | HEIGHT: 76 IN | DIASTOLIC BLOOD PRESSURE: 75 MMHG | BODY MASS INDEX: 27.76 KG/M2 | WEIGHT: 228 LBS

## 2021-07-27 DIAGNOSIS — N18.30 HYPERTENSIVE KIDNEY DISEASE, STAGE III (H): ICD-10-CM

## 2021-07-27 DIAGNOSIS — I12.9 HYPERTENSIVE KIDNEY DISEASE, STAGE III (H): ICD-10-CM

## 2021-07-27 DIAGNOSIS — L02.32 BOIL, BUTTOCK: Primary | ICD-10-CM

## 2021-07-27 DIAGNOSIS — E85.89 AMYLOIDOGENIC TRANSTHYRETIN AMYLOIDOSIS (H): ICD-10-CM

## 2021-07-27 PROCEDURE — 99214 OFFICE O/P EST MOD 30 MIN: CPT | Performed by: NURSE PRACTITIONER

## 2021-07-27 RX ORDER — DOXYCYCLINE 100 MG/1
100 CAPSULE ORAL 2 TIMES DAILY
Qty: 14 CAPSULE | Refills: 0 | Status: SHIPPED | OUTPATIENT
Start: 2021-07-27 | End: 2021-07-27

## 2021-07-27 RX ORDER — DOXYCYCLINE 100 MG/1
100 CAPSULE ORAL 2 TIMES DAILY
Qty: 14 CAPSULE | Refills: 0 | Status: SHIPPED | OUTPATIENT
Start: 2021-07-27 | End: 2022-06-22

## 2021-07-27 RX ORDER — CEPHALEXIN 500 MG/1
500 CAPSULE ORAL
COMMUNITY
Start: 2020-04-06

## 2021-07-27 ASSESSMENT — PAIN SCALES - GENERAL: PAINLEVEL: MODERATE PAIN (5)

## 2021-07-27 ASSESSMENT — MIFFLIN-ST. JEOR: SCORE: 1830.7

## 2021-07-27 NOTE — PROGRESS NOTES
Assessment & Plan     Boil, buttock  Twice daily doxycycline prescribed today to treat boil.  Continue on Keflex daily as prescribed.  Follow-up in the next 3 to 5 days if symptoms or not improving.  Encouraged sitz bath's or warm compress to the area.  And is in agreement this plan.  - doxycycline hyclate (VIBRAMYCIN) 100 MG capsule; Take 1 capsule (100 mg) by mouth 2 times daily    Amyloidogenic transthyretin amyloidosis (H)  Follows with cardiology    Hypertensive kidney disease, stage III  Follows with cardiology.  Blood pressure is elevated today.  Advised patient to monitor blood pressures and report at his next follow-up.                   Return in about 5 days (around 8/1/2021) for ongoing symptoms if not improving.    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  He was restarted on Eliquis recently by cardiology.  He was discontinued off of his metoprolol.  The last couple months.  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  Last cardiology visit was 7/15/2021.  He has also been following with urology.  His primary care is Dr. Azeb Street he would like to switch as he is unable to get in with her frequently due to her large panel size.  He comes in today specifically to discuss some boil on his left buttock.    Boil on left buttock started 4 to 5 days ago.  He has concerns that it is gotten larger and seems to be radiating up.  He has  "not had previous boils of concern.  It is tender to touch as well as sitting.  He will washed with an antibiotic wash the other day.  He has not done any specific warm compresses or sitz bath's.  He is on Keflex twice daily for the last couple years due to the endocarditis he experienced.  He denies any systemic symptoms.        Review of Systems   Constitutional, HEENT, cardiovascular, pulmonary, gi and gu systems are negative, except as otherwise noted.      Objective    BP (!) 148/75 (BP Location: Right arm, Patient Position: Chair, Cuff Size: Adult Large)   Pulse 70   Temp 97.6  F (36.4  C) (Tympanic)   Resp 14   Ht 1.93 m (6' 4\")   Wt 103.4 kg (228 lb)   SpO2 98%   BMI 27.75 kg/m    Body mass index is 27.75 kg/m .  Physical Exam   GENERAL: healthy, alert and no distress  SKIN: Boil left buttock 3cm x 2 cm with localized erythremia measuring 6 cm.  Mildly tender to palpation.  Boil itself is mildly fluctuant.  LYMPH: no cervical, supraclavicular, axillary, or inguinal adenopathy                "

## 2021-07-28 ENCOUNTER — TELEPHONE (OUTPATIENT)
Dept: DERMATOLOGY | Facility: CLINIC | Age: 81
End: 2021-07-28

## 2021-07-28 ENCOUNTER — OFFICE VISIT (OUTPATIENT)
Dept: DERMATOLOGY | Facility: CLINIC | Age: 81
End: 2021-07-28
Payer: MEDICARE

## 2021-07-28 VITALS — OXYGEN SATURATION: 98 % | SYSTOLIC BLOOD PRESSURE: 127 MMHG | DIASTOLIC BLOOD PRESSURE: 71 MMHG | HEART RATE: 65 BPM

## 2021-07-28 DIAGNOSIS — L82.1 SEBORRHEIC KERATOSIS: ICD-10-CM

## 2021-07-28 DIAGNOSIS — L72.0 MILIA: Primary | ICD-10-CM

## 2021-07-28 DIAGNOSIS — L81.4 LENTIGO: ICD-10-CM

## 2021-07-28 DIAGNOSIS — D23.9 DERMAL NEVUS: ICD-10-CM

## 2021-07-28 PROCEDURE — 99213 OFFICE O/P EST LOW 20 MIN: CPT | Performed by: DERMATOLOGY

## 2021-07-28 NOTE — NURSING NOTE
"Initial /71   Pulse 65   SpO2 98%  Estimated body mass index is 27.75 kg/m  as calculated from the following:    Height as of 7/27/21: 1.93 m (6' 4\").    Weight as of 7/27/21: 103.4 kg (228 lb). .      "

## 2021-07-28 NOTE — PROGRESS NOTES
Jameel Barrett is an extremely pleasant 81 year old year old male patient here today for spot on right nsw.   .   Patient states this has been present for a while.  Patient reports the following symptoms:  none.  Patient reports the following previous treatments none.  These treatments did not work.  Patient reports the following modifying factors none.  Associated symptoms: none.  Patient has no other skin complaints today.  Remainder of the HPI, Meds, PMH, Allergies, FH, and SH was reviewed in chart.      Past Medical History:   Diagnosis Date     Coronary artery disease      Hypertension      Neck pain 6/8/2012     Skin cancer        Past Surgical History:   Procedure Laterality Date     CARDIAC SURGERY  4/8/08      CARDIAC SURGERY  4/12/07     CV TRANSCATHETER VALVE PLACEMENT  10/07/2019    at [place]     JOINT REPLACEMENT, HIP RT/LT Right 07/30/2018    Joint Replacement Hip RT/LT     ORTHOPEDIC SURGERY  10/08    LT hip replacment        Family History   Problem Relation Age of Onset     Dementia Mother      Diabetes Father      Hypertension Father        Social History     Socioeconomic History     Marital status:      Spouse name: Not on file     Number of children: Not on file     Years of education: Not on file     Highest education level: Not on file   Occupational History     Not on file   Tobacco Use     Smoking status: Never Smoker     Smokeless tobacco: Never Used   Vaping Use     Vaping Use: Never used   Substance and Sexual Activity     Alcohol use: Yes     Comment: occ     Drug use: Never     Sexual activity: Not Currently   Other Topics Concern     Parent/sibling w/ CABG, MI or angioplasty before 65F 55M? Not Asked   Social History Narrative     Not on file     Social Determinants of Health     Financial Resource Strain:      Difficulty of Paying Living Expenses:    Food Insecurity:      Worried About Running Out of Food in the Last Year:      Ran Out of Food in the Last Year:    Transportation  Needs:      Lack of Transportation (Medical):      Lack of Transportation (Non-Medical):    Physical Activity:      Days of Exercise per Week:      Minutes of Exercise per Session:    Stress:      Feeling of Stress :    Social Connections:      Frequency of Communication with Friends and Family:      Frequency of Social Gatherings with Friends and Family:      Attends [employer] Services:      Active Member of Clubs or Organizations:      Attends Club or Organization Meetings:      Marital Status:    Intimate Partner Violence:      Fear of Current or Ex-Partner:      Emotionally Abused:      Physically Abused:      Sexually Abused:        Outpatient Encounter Medications as of 7/28/2021   Medication Sig Dispense Refill     acetaminophen (TYLENOL) 500 MG tablet Take 1,000 mg by mouth 2 times daily        atorvastatin (LIPITOR) 20 MG tablet Take 10 mg by mouth every evening        cephALEXin (KEFLEX) 500 MG capsule Take 500 mg by mouth       cyanocobalamin (VITAMIN B-12) 500 MCG tablet Take 500 mcg by mouth daily       doxycycline hyclate (VIBRAMYCIN) 100 MG capsule Take 1 capsule (100 mg) by mouth 2 times daily 14 capsule 0     potassium chloride ER (K-TAB/KLOR-CON) 10 MEQ CR tablet Take 1 tablet by mouth       rivaroxaban ANTICOAGULANT (XARELTO) 20 MG TABS tablet Take 1 tablet (20 mg) by mouth daily (with dinner) 35 tablet 1     Tafamidis 61 MG CAPS Take 61 mg by mouth       torsemide (DEMADEX) 20 MG tablet TAKE 1 TABLET BY MOUTH TWICE DAILY FOR 5 DAYS THEN DECREASE TO 1 TABLET DAILY       No facility-administered encounter medications on file as of 7/28/2021.             O:   NAD, WDWN, Alert & Oriented, Mood & Affect wnl, Vitals stable   Here today alone   /71   Pulse 65   SpO2 98%    General appearance normal   Vitals stable   Alert, oriented and in no acute distress     R NSw white papule    Stuck on papules and brown macules on face and ext   Flesh colored papules on face     The remainder of expanded  problem focused exam was normal; the following areas were examined:  conjunctiva/lids, face, neck, lips, chest, digits/nails, RUE, LUE.      Eyes: Conjunctivae/lids:Normal     ENT: Lips, buccal mucosa, tongue: normal    MSK:Normal    Cardiovascular: peripheral edema none    Pulm: Breathing Normal    Neuro/Psych: Orientation:Alert and Orientedx3 ; Mood/Affect:normal       A/P:  1. Seborrheic keratosis, lentigo, milia, dermal nevus    It was a pleasure speaking to Jameel Barrett today.  Previous clinic notes and pertinent laboratory tests were reviewed prior to Jameel Barrett's visit.  Nature of benign skin lesions dicussed with patient.  Signs and Symptoms of skin cancer discussed with patient.  Patient encouraged to perform monthly skin exams.  UV precautions reviewed with patient.  Risks of non-melanoma skin cancer discussed with patient   Return to clinic 12 months

## 2021-07-28 NOTE — TELEPHONE ENCOUNTER
Patient's wife notified. I did advise to expect to wait a while as Dr. Valdez is overbooked already and they verbalized understanding.     I also explained need for Consent to communicate as well. Karla Collins RN

## 2021-07-28 NOTE — TELEPHONE ENCOUNTER
Reason for Call:  Other appointment    Detailed comments: Pt's wife requesting pt and wife to be seen at the same time. Please let her know if this will work.    Phone Number Patient can be reached at: Home number on file [place] ph: [phone] (home)    Best Time: any    Can we leave a detailed message on this number? YES    Call taken on 7/28/2021 at 10:23 AM by Christiano Santana

## 2021-07-28 NOTE — LETTER
7/28/2021         RE: Jameel Barrett  [address]  [facility] [address]        Dear Colleague,    Thank you for referring your patient, Jameel Barrett, to the [facility]. Please see a copy of my visit note below.    Jameel Barrett is an extremely pleasant 81 year old year old male patient here today for spot on right nsw.   .   Patient states this has been present for a while.  Patient reports the following symptoms:  none.  Patient reports the following previous treatments none.  These treatments did not work.  Patient reports the following modifying factors none.  Associated symptoms: none.  Patient has no other skin complaints today.  Remainder of the HPI, Meds, PMH, Allergies, FH, and SH was reviewed in chart.      Past Medical History:   Diagnosis Date     Coronary artery disease      Hypertension      Neck pain 6/8/2012     Skin cancer        Past Surgical History:   Procedure Laterality Date     CARDIAC SURGERY  4/8/08      CARDIAC SURGERY  4/12/07     CV TRANSCATHETER VALVE PLACEMENT  10/07/2019    at [place]     JOINT REPLACEMENT, HIP RT/LT Right 07/30/2018    Joint Replacement Hip RT/LT     ORTHOPEDIC SURGERY  10/08    LT hip replacment        Family History   Problem Relation Age of Onset     Dementia Mother      Diabetes Father      Hypertension Father        Social History     Socioeconomic History     Marital status:      Spouse name: Not on file     Number of children: Not on file     Years of education: Not on file     Highest education level: Not on file   Occupational History     Not on file   Tobacco Use     Smoking status: Never Smoker     Smokeless tobacco: Never Used   Vaping Use     Vaping Use: Never used   Substance and Sexual Activity     Alcohol use: Yes     Comment: occ     Drug use: Never     Sexual activity: Not Currently   Other Topics Concern     Parent/sibling w/ CABG, MI or angioplasty before 65F 55M? Not Asked   Social History Narrative     Not on  file     Social Determinants of Health     Financial Resource Strain:      Difficulty of Paying Living Expenses:    Food Insecurity:      Worried About Running Out of Food in the Last Year:      Ran Out of Food in the Last Year:    Transportation Needs:      Lack of Transportation (Medical):      Lack of Transportation (Non-Medical):    Physical Activity:      Days of Exercise per Week:      Minutes of Exercise per Session:    Stress:      Feeling of Stress :    Social Connections:      Frequency of Communication with Friends and Family:      Frequency of Social Gatherings with Friends and Family:      Attends Christian Services:      Active Member of Clubs or Organizations:      Attends Club or Organization Meetings:      Marital Status:    Intimate Partner Violence:      Fear of Current or Ex-Partner:      Emotionally Abused:      Physically Abused:      Sexually Abused:        Outpatient Encounter Medications as of 7/28/2021   Medication Sig Dispense Refill     acetaminophen (TYLENOL) 500 MG tablet Take 1,000 mg by mouth 2 times daily        atorvastatin (LIPITOR) 20 MG tablet Take 10 mg by mouth every evening        cephALEXin (KEFLEX) 500 MG capsule Take 500 mg by mouth       cyanocobalamin (VITAMIN B-12) 500 MCG tablet Take 500 mcg by mouth daily       doxycycline hyclate (VIBRAMYCIN) 100 MG capsule Take 1 capsule (100 mg) by mouth 2 times daily 14 capsule 0     potassium chloride ER (K-TAB/KLOR-CON) 10 MEQ CR tablet Take 1 tablet by mouth       rivaroxaban ANTICOAGULANT (XARELTO) 20 MG TABS tablet Take 1 tablet (20 mg) by mouth daily (with dinner) 35 tablet 1     Tafamidis 61 MG CAPS Take 61 mg by mouth       torsemide (DEMADEX) 20 MG tablet TAKE 1 TABLET BY MOUTH TWICE DAILY FOR 5 DAYS THEN DECREASE TO 1 TABLET DAILY       No facility-administered encounter medications on file as of 7/28/2021.             O:   NAD, WDWN, Alert & Oriented, Mood & Affect wnl, Vitals stable   Here today alone   /71    Pulse 65   SpO2 98%    General appearance normal   Vitals stable   Alert, oriented and in no acute distress     R NSw white papule    Stuck on papules and brown macules on face and ext   Flesh colored papules on face     The remainder of expanded problem focused exam was normal; the following areas were examined:  conjunctiva/lids, face, neck, lips, chest, digits/nails, RUE, LUE.      Eyes: Conjunctivae/lids:Normal     ENT: Lips, buccal mucosa, tongue: normal    MSK:Normal    Cardiovascular: peripheral edema none    Pulm: Breathing Normal    Neuro/Psych: Orientation:Alert and Orientedx3 ; Mood/Affect:normal       A/P:  1. Seborrheic keratosis, lentigo, milia, dermal nevus    It was a pleasure speaking to Jameel Barrett today.  Previous clinic notes and pertinent laboratory tests were reviewed prior to Jameel Barrett's visit.  Nature of benign skin lesions dicussed with patient.  Signs and Symptoms of skin cancer discussed with patient.  Patient encouraged to perform monthly skin exams.  UV precautions reviewed with patient.  Risks of non-melanoma skin cancer discussed with patient   Return to clinic 12 months        Again, thank you for allowing me to participate in the care of your patient.        Sincerely,        Tray Valdez MD

## 2021-08-06 ENCOUNTER — HOSPITAL ENCOUNTER (OUTPATIENT)
Dept: PHYSICAL THERAPY | Facility: CLINIC | Age: 81
Setting detail: THERAPIES SERIES
End: 2021-08-06
Attending: NURSE PRACTITIONER
Payer: MEDICARE

## 2021-08-06 DIAGNOSIS — R26.89 IMBALANCE: ICD-10-CM

## 2021-08-06 PROCEDURE — 97161 PT EVAL LOW COMPLEX 20 MIN: CPT | Mod: GP | Performed by: PHYSICAL THERAPIST

## 2021-08-06 PROCEDURE — 97112 NEUROMUSCULAR REEDUCATION: CPT | Mod: GP | Performed by: PHYSICAL THERAPIST

## 2021-08-06 PROCEDURE — 97110 THERAPEUTIC EXERCISES: CPT | Mod: GP | Performed by: PHYSICAL THERAPIST

## 2021-08-06 NOTE — PROGRESS NOTES
[facility]        OUTPATIENT PHYSICAL THERAPY FUNCTIONAL EVALUATION  PLAN OF TREATMENT FOR OUTPATIENT REHABILITATION  (COMPLETE FOR INITIAL CLAIMS ONLY)  Patient's Last Name, First Name, M.I.  YOB: 1940  Jameel Barrett     Provider's Name   [facility]   Medical Record No.  [id number]     Start of Care Date:  08/06/21   Onset Date:  07/15/21   Type:     _X__PT   ____OT  ____SLP Medical Diagnosis:  (P) imbalance     PT Diagnosis:  (P) instability of gait Visits from SOC:  1                              __________________________________________________________________________________  Plan of Treatment/Functional Goals:  (P) balance training, gait training, neuromuscular re-education, ROM, strengthening, stretching, manual therapy           GOALS     (P) Independent in HEP including strengthening, stretching, balance, coordination, SAQ in order to continue on own  (P) 10/15/21       (P) improve balance demonstrated by increase in scores on balance tests by 10%   (P) 10/15/21          Therapy Frequency:  (P) 1 time/week   Predicted Duration of Therapy Intervention:  (P) 10 weeks to establish HEP and see change in function    Hodan Gomes, PT, DPT                                   I CERTIFY THE NEED FOR THESE SERVICES FURNISHED UNDER        THIS PLAN OF TREATMENT AND WHILE UNDER MY CARE     (Physician co-signature of this document indicates review and certification of the therapy plan).                Certification Date From:  (P) 08/06/21   Certification Date To:  (P) 10/29/21    Referring Provider:  Breanna Taylor CNP    Initial Assessment  See Epic Evaluation- Start of Care Date: 08/06/21

## 2021-08-06 NOTE — PROGRESS NOTES
08/06/21 1100   Quick Adds   Quick Adds Certification   Type of Visit Initial OP PT Evaluation   General Information   Start of Care Date 08/06/21   Referring Physician Breanna Taylor CNP   Orders Evaluate and Treat as Indicated   Order Date 07/20/21   Medical Diagnosis imbalance   Onset of illness/injury or Date of Surgery 07/15/21   Surgical/Medical history reviewed Yes   Pertinent history of current problem (include personal factors and/or comorbidities that impact the POC) Homer is an 80 yo male referred to PT due to c/o persistance imbalance following episode of sudden onset severe imbalance and R side weakness when at [facility]; He was taken to [facility] ED via ambulance; upon arrival symptoms of R facial droop and R UE drift had resolved; Head CT and MRI both negative for acute bleed or infarct. He was eventually discharged to home with follow up on 7/20/2021 and PT order.  PMH:  CAD, A flutter, CHF, aortic valve stenosis s/p TAVR Oct 2019; hper lipidemia, pulmonary fibrosis; HTN, stage III kidney disease, anxiety; amyloidosis working with neurology at [facility] and long standing h/o of imbalance when standing up; peripheral neuropathies working with neurology at [facility] in [place] for that and Cardiology at [facility]   Prior level of function comment imbalance; dizzy if standing too quickly; no dizziness with rolling or supinw to sit; not sure if it is from feet or head   Diagnostic Tests CT Scan;MRI   MRI Results unremarkable   CT Results unremarkable   Current Community Support Family/friend caregiver   Patient role/Employment history Retired   Living environment House/townhome   Home/Community Accessibility Comments several steps to enter; full flight of stairs to basement    Patient/Family Goals Statement no longer have trouble with balance   Fall Risk Screen   Fall screen completed by PT   Have you fallen 2 or more times in the past year? No   Timed Up and Go score (seconds) 16.96   Is patient a fall risk? Yes   Fall  "screen comments pause when first standing and when turning during TUG   Abuse Screen (yes response referral indicated)   Feels Unsafe at Home or Work/School no   Feels Threatened by Someone no   Does Anyone Try to Keep You From Having Contact with Others or Doing Things Outside Your Home? no   Physical Signs of Abuse Present no   Cognitive Status Examination   Orientation orientation to person, place and time   Level of Consciousness alert   Follows Commands and Answers Questions 100% of the time   Personal Safety and Judgment intact   Memory intact   Cognitive Comment appears WNL during PT   Observation   Observation NAD; tall thin man   Posture   Posture Comments post pelvic tilt; flat lumbar spiine; rounded shoulders forward head   Range of Motion (ROM)   ROM Comment shldr flxn and ABD slightly limited; VERY tight HF/quads    Strength   Strength Comments LEs 4+/5   Bed Mobility   Bed Mobility Comments independent   Transfer Skills   Transfer Comments independent pause when coming to stand   Balance Special Tests Timed Up and Go   Seconds 16.96 Seconds   Comments pause when coming to stand and turning   Balance Special Tests Romberg   Seconds 3 Seconds   Comments 15 sec \"full body jerk:    Reflexes   Deep Tendon Reflexes Biceps;Brachioradialis;Triceps;Quadriceps;Achilles   Biceps (C5,6) slightly increased relative to others   Brachioradialis (C5,C6) decreased relative to others   Triceps wnl   Quadriceps (L2-L4) wnl   Achilles (S1-S2) decreased relative to ohters   Muscle Tone   Muscle Tone Comments wnl   Planned Therapy Interventions   Planned Therapy Interventions balance training;gait training;neuromuscular re-education;ROM;strengthening;stretching;manual therapy   Clinical Impression   Criteria for Skilled Therapeutic Interventions Met yes, treatment indicated   PT Diagnosis instability of gait   Influenced by the following impairments very tight HS and HF/quad; slight LE weakness; decreased balance EC "   Functional limitations due to impairments instability of gait; fear of falling    Clinical Presentation Stable/Uncomplicated   Clinical Presentation Rationale comorbidities periperal neuropathy; cardiovascular   Clinical Decision Making (Complexity) Low complexity   Therapy Frequency 1 time/week   Predicted Duration of Therapy Intervention (days/wks) 10 wks to establish HEP and see change in function   Risk & Benefits of therapy have been explained Yes   Patient, Family & other staff in agreement with plan of care Yes   Clinical Impression Comments Pt presents with balance deficits when EC and upon standing in addition to very tight LE muscles; He will benefit from general strengthening and stretching program in addition to more specific balance, coordination, speed agility and quickness training to improve his safety and confidence in community and home allowing him to remain living independently in own home   Education Assessment   Preferred Learning Style Listening;Reading;Demonstration;Pictures/video   Barriers to Learning No barriers   GOALS   PT Eval Goals 1;2   Goal 1   Goal Description Independent in HEP including strengthening, stretching, balance, coordination, SAQ in order to continue on own   Target Date 10/15/21   Goal 2   Goal Description improve balance demonstrated by increase in scores on balance tests by 10%    Target Date 10/15/21   Total Evaluation Time   PT Michael Low Complexity Minutes (75070) 15   Therapy Certification   Certification date from 08/06/21   Certification date to 10/29/21   Medical Diagnosis imbalance   Certification I certify the need for these services furnished under this plan of treatment and while under my care.  (Physician co-signature of this document indicates review and certification of the therapy plan).

## 2021-08-13 ENCOUNTER — HOSPITAL ENCOUNTER (OUTPATIENT)
Dept: PHYSICAL THERAPY | Facility: CLINIC | Age: 81
Setting detail: THERAPIES SERIES
End: 2021-08-13
Attending: NURSE PRACTITIONER
Payer: MEDICARE

## 2021-08-13 PROCEDURE — 97112 NEUROMUSCULAR REEDUCATION: CPT | Mod: GP | Performed by: PHYSICAL THERAPIST

## 2021-08-13 PROCEDURE — 97110 THERAPEUTIC EXERCISES: CPT | Mod: GP | Performed by: PHYSICAL THERAPIST

## 2021-08-18 ENCOUNTER — HOSPITAL ENCOUNTER (OUTPATIENT)
Dept: PHYSICAL THERAPY | Facility: CLINIC | Age: 81
Setting detail: THERAPIES SERIES
End: 2021-08-18
Attending: NURSE PRACTITIONER
Payer: MEDICARE

## 2021-08-18 PROCEDURE — 97110 THERAPEUTIC EXERCISES: CPT | Mod: GP | Performed by: PHYSICAL THERAPIST

## 2021-08-18 PROCEDURE — 97112 NEUROMUSCULAR REEDUCATION: CPT | Mod: GP | Performed by: PHYSICAL THERAPIST

## 2021-08-27 ENCOUNTER — HOSPITAL ENCOUNTER (OUTPATIENT)
Dept: PHYSICAL THERAPY | Facility: CLINIC | Age: 81
Setting detail: THERAPIES SERIES
End: 2021-08-27
Attending: NURSE PRACTITIONER
Payer: MEDICARE

## 2021-08-27 PROCEDURE — 97110 THERAPEUTIC EXERCISES: CPT | Mod: GP | Performed by: PHYSICAL THERAPIST

## 2021-09-08 ENCOUNTER — HOSPITAL ENCOUNTER (OUTPATIENT)
Dept: PHYSICAL THERAPY | Facility: CLINIC | Age: 81
Setting detail: THERAPIES SERIES
End: 2021-09-08
Attending: NURSE PRACTITIONER
Payer: MEDICARE

## 2021-09-08 PROCEDURE — 97110 THERAPEUTIC EXERCISES: CPT | Mod: GP | Performed by: PHYSICAL THERAPIST

## 2021-09-15 ENCOUNTER — HOSPITAL ENCOUNTER (OUTPATIENT)
Dept: PHYSICAL THERAPY | Facility: CLINIC | Age: 81
Setting detail: THERAPIES SERIES
End: 2021-09-15
Attending: NURSE PRACTITIONER
Payer: MEDICARE

## 2021-09-15 PROCEDURE — 97110 THERAPEUTIC EXERCISES: CPT | Mod: GP | Performed by: PHYSICAL THERAPIST

## 2021-09-25 ENCOUNTER — HEALTH MAINTENANCE LETTER (OUTPATIENT)
Age: 81
End: 2021-09-25

## 2021-09-29 ENCOUNTER — HOSPITAL ENCOUNTER (OUTPATIENT)
Dept: PHYSICAL THERAPY | Facility: CLINIC | Age: 81
Setting detail: THERAPIES SERIES
End: 2021-09-29
Attending: NURSE PRACTITIONER
Payer: MEDICARE

## 2021-09-29 PROCEDURE — 97110 THERAPEUTIC EXERCISES: CPT | Mod: GP | Performed by: PHYSICAL THERAPIST

## 2021-09-29 NOTE — PROGRESS NOTES
Jameel SILVA Nena  1940  Breanna Taylor, BENY CNP  [address] [place] [address]  Diagnosis:  Instability of gait Physical Therapy Discharge Summary  09/29/21 0900   Signing Clinician's Name / Credentials   Signing clinician's name / credentials Hodan Gomes PT, DPT   Session Number   Session Number 6 medicare  soc 8/6/2021   Progress Note/Recertification   Progress Note Completed Date 09/29/21   Recertification Due Date 10/15/21   Goal 1   Goal Description Independent in HEP including strengthening, stretching, balance, coordination, SAQ in order to continue on own   Target Date 10/15/21   Goal Progress t   Goal 2   Goal Description improve balance demonstrated by increase in scores on balance tests by 10%    Target Date 10/15/21   Subjective Report   Subjective Report walked 1.5 miles and legs are really sore; takes a long time to recover; has been painting house so up down ladder and working hard so tired overall   Therapeutic Procedure/exercise   Therapeutic Procedures: strength, endurance, ROM, flexibillity minutes (11053) 40   Skilled Intervention education and exercise instruction with manual facilitation, tactile and verbal cueing for proper technique to improve balance, strength and functional mobility   Patient Response good demo of HEP   Treatment Detail review HEP; instructed in closed chain propriocepiton  with Y TB   Progress added proprioception    Education   Learner Patient   Readiness Acceptance;Eager   Method Booklet/handout;Explanation;Demonstration   Response Verbalizes Understanding;Demonstrates Understanding   Education Comments i   Plan   Home program added above; continue previous   Updates to plan of care DC PT per pt request   Comments   Comments Pt wishes to DC PT as he has enough exercises and is preparing to go to [place] for winter   Total Session Time   Timed Code Treatment Minutes 40   Total Treatment Time (sum of timed and untimed services) 40

## 2022-05-07 ENCOUNTER — HEALTH MAINTENANCE LETTER (OUTPATIENT)
Age: 82
End: 2022-05-07

## 2022-06-10 ENCOUNTER — OFFICE VISIT (OUTPATIENT)
Dept: FAMILY MEDICINE | Facility: CLINIC | Age: 82
End: 2022-06-10
Payer: MEDICARE

## 2022-06-10 VITALS
SYSTOLIC BLOOD PRESSURE: 132 MMHG | RESPIRATION RATE: 16 BRPM | BODY MASS INDEX: 27.52 KG/M2 | TEMPERATURE: 97 F | HEIGHT: 76 IN | WEIGHT: 226 LBS | DIASTOLIC BLOOD PRESSURE: 72 MMHG | OXYGEN SATURATION: 98 % | HEART RATE: 71 BPM

## 2022-06-10 DIAGNOSIS — E85.89 AMYLOIDOGENIC TRANSTHYRETIN AMYLOIDOSIS (H): ICD-10-CM

## 2022-06-10 DIAGNOSIS — J84.9 INTERSTITIAL LUNG DISEASE (H): ICD-10-CM

## 2022-06-10 DIAGNOSIS — I20.89 STABLE ANGINA PECTORIS (H): ICD-10-CM

## 2022-06-10 DIAGNOSIS — I48.0 PAROXYSMAL ATRIAL FIBRILLATION (H): ICD-10-CM

## 2022-06-10 DIAGNOSIS — I71.9 AORTIC ANEURYSM WITHOUT RUPTURE, UNSPECIFIED PORTION OF AORTA (H): ICD-10-CM

## 2022-06-10 DIAGNOSIS — M25.512 CHRONIC LEFT SHOULDER PAIN: Primary | ICD-10-CM

## 2022-06-10 DIAGNOSIS — Z13.220 SCREENING FOR HYPERLIPIDEMIA: ICD-10-CM

## 2022-06-10 DIAGNOSIS — I50.32 CHRONIC DIASTOLIC HEART FAILURE (H): ICD-10-CM

## 2022-06-10 DIAGNOSIS — N18.30 HYPERTENSIVE KIDNEY DISEASE, STAGE III (H): ICD-10-CM

## 2022-06-10 DIAGNOSIS — I12.9 HYPERTENSIVE KIDNEY DISEASE, STAGE III (H): ICD-10-CM

## 2022-06-10 DIAGNOSIS — G89.29 CHRONIC LEFT SHOULDER PAIN: Primary | ICD-10-CM

## 2022-06-10 PROCEDURE — 20610 DRAIN/INJ JOINT/BURSA W/O US: CPT | Mod: LT | Performed by: FAMILY MEDICINE

## 2022-06-10 PROCEDURE — 99213 OFFICE O/P EST LOW 20 MIN: CPT | Mod: 25 | Performed by: FAMILY MEDICINE

## 2022-06-10 RX ORDER — TRIAMCINOLONE ACETONIDE 40 MG/ML
40 INJECTION, SUSPENSION INTRA-ARTICULAR; INTRAMUSCULAR ONCE
Status: COMPLETED | OUTPATIENT
Start: 2022-06-10 | End: 2022-06-10

## 2022-06-10 RX ADMIN — TRIAMCINOLONE ACETONIDE 40 MG: 40 INJECTION, SUSPENSION INTRA-ARTICULAR; INTRAMUSCULAR at 11:45

## 2022-06-10 ASSESSMENT — PAIN SCALES - GENERAL: PAINLEVEL: SEVERE PAIN (6)

## 2022-06-10 NOTE — NURSING NOTE
"Initial /72   Pulse 71   Resp 16   Ht 1.93 m (6' 4\")   Wt 102.5 kg (226 lb)   SpO2 98%   BMI 27.51 kg/m   Estimated body mass index is 27.51 kg/m  as calculated from the following:    Height as of this encounter: 1.93 m (6' 4\").    Weight as of this encounter: 102.5 kg (226 lb). .      "

## 2022-06-10 NOTE — PROGRESS NOTES
"  Assessment & Plan     Chronic left shoulder pain  Injected as below. Follow-up if not improving or if worsening.   - triamcinolone (KENALOG-40) injection 40 mg  - DRAIN/INJECT LARGE JOINT/BURSA    HCC chart reconciliation and known higher risk conditions that I take into account for medical decision making:   Aortic aneurysm without rupture, unspecified portion of aorta (H)  Stable. Follows with cardiology.    Stable angina pectoris (H)  Stable. Follows with cardiology.    Chronic diastolic heart failure (H)  Stable. Follows with cardiology.    Paroxysmal atrial fibrillation (H)  Stable. Follows with cardiology.    Interstitial lung disease (H)  Stable     Amyloidogenic transthyretin amyloidosis (H)  Stable. Follows with cardiology.    Hypertensive kidney disease, stage III (H)  Stable monitor labs annually.               BMI:   Estimated body mass index is 27.51 kg/m  as calculated from the following:    Height as of this encounter: 1.93 m (6' 4\").    Weight as of this encounter: 102.5 kg (226 lb).           No follow-ups on file.    Kendra Mena MD  [facility]    Jose Coronel is a 82 year old who presents for the following health issues     History of Present Illness       Reason for visit:  Shoulder ligament damage  Symptom onset:  3-4 weeks ago    He eats 2-3 servings of fruits and vegetables daily.He consumes 0 sweetened beverage(s) daily.He exercises with enough effort to increase his heart rate 10 to 19 minutes per day.  He exercises with enough effort to increase his heart rate 7 days per week. He is missing 1 dose(s) of medications per week.  He is not taking prescribed medications regularly due to other.             Review of Systems   Constitutional, neuro, ENT, endocrine, pulmonary, cardiac, gastrointestinal, genitourinary, musculoskeletal, integument and psychiatric systems are negative, except as otherwise noted.       Objective    /72   Pulse 71   " "Temp 97  F (36.1  C) (Tympanic)   Resp 16   Ht 1.93 m (6' 4\")   Wt 102.5 kg (226 lb)   SpO2 98%   BMI 27.51 kg/m    Body mass index is 27.51 kg/m .  Physical Exam   GENERAL: Pleasant, well appearing male.  Procedure:    Procedure: subacromial space injection - posterior approach of left  shoulder    Indication:  left  shoulder pain    After discussion of risks/benefits of steroid injection procedure, verbal informed consent was obtained to proceed. Injection was prepared using 8 cc of 1% lidocaine and 1 cc of 40 mg/cc of Kenalog. Patient was seated on the exam table. After shoulder examination, injection site was marked at 2cm below the posterior lateral edge of the acromion. Area was then cleaned with Betadine and an 21-gauge 1.5 inch aspiration needle was inserted at an upwards angle to the injection site and advanced cautiously while applying suction. 9 mL of steroid/lidocaine solution was injected into the shoulder joint. Needle was withdrawn. There was a scant amount of bleeding which resolved with pressure. Sterile adhesive bandage was applied. Instructed the patient to move shoulder through through full range of motion to distribute steroid. Patient tolerated the procedure well.                   "

## 2022-06-14 ENCOUNTER — TELEPHONE (OUTPATIENT)
Dept: FAMILY MEDICINE | Facility: CLINIC | Age: 82
End: 2022-06-14
Payer: MEDICARE

## 2022-06-14 NOTE — TELEPHONE ENCOUNTER
Reason for call:    Symptom or request:     Patient called about orders from [place].      Best Time:  any    Can we leave a detailed message on this number?  YES     Lizbeth PALOMARES  Station   '

## 2022-06-15 NOTE — TELEPHONE ENCOUNTER
Pt is calling back and has taken care of the Iodine allergy test per [place] request picking up this at the  Pharm.    Noelle Castillo  [facility]

## 2022-06-15 NOTE — TELEPHONE ENCOUNTER
Not enough info. when message was taken.     Called pt - Pt is unsure what he needs.  He will call us back when he figures out exactly what he needs help with.

## 2022-09-19 ENCOUNTER — TELEPHONE (OUTPATIENT)
Dept: FAMILY MEDICINE | Facility: CLINIC | Age: 82
End: 2022-09-19

## 2022-09-19 NOTE — TELEPHONE ENCOUNTER
Writer called patient and patient was scheduled 9/20/22 with provider Jose Buckley RN [facility]

## 2022-09-19 NOTE — TELEPHONE ENCOUNTER
Reason for Call:  Appointment Request    Patient requesting this type of appt:  Anxiety    Requested provider: Sophia Mendez    When does patient want to be seen/preferred time: as soon as possible    Comments: Pt would like a call back from care team to schedule appt to be seen for anxiety attacks he has been having recently.    Could we send this information to you in [place] or would you prefer to receive a phone call?:   Patient would prefer a phone call   Okay to leave a detailed message?: Yes at Home number on file [phone] (home)    Call taken on 9/19/2022 at 1:07 PM by Ankita Esquivel

## 2022-09-20 ENCOUNTER — OFFICE VISIT (OUTPATIENT)
Dept: FAMILY MEDICINE | Facility: CLINIC | Age: 82
End: 2022-09-20
Payer: MEDICARE

## 2022-09-20 VITALS
WEIGHT: 223.6 LBS | BODY MASS INDEX: 27.23 KG/M2 | SYSTOLIC BLOOD PRESSURE: 130 MMHG | OXYGEN SATURATION: 98 % | TEMPERATURE: 97.2 F | RESPIRATION RATE: 14 BRPM | HEIGHT: 76 IN | HEART RATE: 74 BPM | DIASTOLIC BLOOD PRESSURE: 70 MMHG

## 2022-09-20 DIAGNOSIS — F43.0 ACUTE REACTION TO STRESS: ICD-10-CM

## 2022-09-20 DIAGNOSIS — A63.0 GENITAL WARTS: ICD-10-CM

## 2022-09-20 DIAGNOSIS — F41.0 PANIC ATTACK: Primary | ICD-10-CM

## 2022-09-20 PROCEDURE — 99214 OFFICE O/P EST MOD 30 MIN: CPT | Performed by: NURSE PRACTITIONER

## 2022-09-20 RX ORDER — ALPRAZOLAM 0.25 MG
.25-.5 TABLET ORAL 3 TIMES DAILY PRN
Qty: 15 TABLET | Refills: 0 | Status: SHIPPED | OUTPATIENT
Start: 2022-09-20 | End: 2022-09-20

## 2022-09-20 RX ORDER — ALPRAZOLAM 0.25 MG
.25-.5 TABLET ORAL 3 TIMES DAILY PRN
Qty: 15 TABLET | Refills: 0 | Status: SHIPPED | OUTPATIENT
Start: 2022-09-20 | End: 2023-06-07

## 2022-09-20 ASSESSMENT — ANXIETY QUESTIONNAIRES
3. WORRYING TOO MUCH ABOUT DIFFERENT THINGS: MORE THAN HALF THE DAYS
1. FEELING NERVOUS, ANXIOUS, OR ON EDGE: SEVERAL DAYS
5. BEING SO RESTLESS THAT IT IS HARD TO SIT STILL: NOT AT ALL
7. FEELING AFRAID AS IF SOMETHING AWFUL MIGHT HAPPEN: MORE THAN HALF THE DAYS
IF YOU CHECKED OFF ANY PROBLEMS ON THIS QUESTIONNAIRE, HOW DIFFICULT HAVE THESE PROBLEMS MADE IT FOR YOU TO DO YOUR WORK, TAKE CARE OF THINGS AT HOME, OR GET ALONG WITH OTHER PEOPLE: NOT DIFFICULT AT ALL
2. NOT BEING ABLE TO STOP OR CONTROL WORRYING: NEARLY EVERY DAY
GAD7 TOTAL SCORE: 8
6. BECOMING EASILY ANNOYED OR IRRITABLE: NOT AT ALL
GAD7 TOTAL SCORE: 8

## 2022-09-20 ASSESSMENT — PAIN SCALES - GENERAL: PAINLEVEL: NO PAIN (0)

## 2022-09-20 ASSESSMENT — PATIENT HEALTH QUESTIONNAIRE - PHQ9
SUM OF ALL RESPONSES TO PHQ QUESTIONS 1-9: 13
5. POOR APPETITE OR OVEREATING: NOT AT ALL

## 2022-09-20 NOTE — PROGRESS NOTES
"  Assessment & Plan       ICD-10-CM    1. Panic attack  F41.0 REVIEW OF HEALTH MAINTENANCE PROTOCOL ORDERS     ALPRAZolam (XANAX) 0.25 MG tablet     DISCONTINUED: ALPRAZolam (XANAX) 0.25 MG tablet   2. Acute reaction to stress  F43.0    3. Genital warts  A63.0      Panic attacks with acute reaction to stress discussed with patient today.  Discussed a daily medication such as 25 to 50 mg of sertraline.  Patient declines today and wishes to talk to his primary in [place].  I opted to prescribe him low-dose alprazolam for acute panic symptoms and panic attacks.  Reviewed risks of patient taking this medication and avoiding persistent regular chronic use.    In terms of his genital warts advised patient to continue to follow with dermatology since he is established and has a follow-up appointment scheduled.       BMI:   Estimated body mass index is 27.22 kg/m  as calculated from the following:    Height as of this encounter: 1.93 m (6' 4\").    Weight as of this encounter: 101.4 kg (223 lb 9.6 oz).       Depression Screening Follow Up    PHQ 9/20/2022   PHQ-9 Total Score 13   Q9: Thoughts of better off dead/self-harm past 2 weeks Not at all         Follow Up Actions Taken  Crisis resource information provided in After Visit Summary  Patient declined referral.       Return in about 2 weeks (around 10/4/2022) for ongoing symptoms if not improving.    BENY Davis CNP  [facility]    Jose Coronel is a 82 year old, presenting for the following health issues:  RECHECK (pt has had a increase in anxiety since 9/15/22)      HPI     Anxiety Follow-Up    How are you doing with your anxiety since your last visit? Worsened     Are you having other symptoms that might be associated with anxiety? Yes:  panic attacks    Have you had a significant life event? OTHER: drivers test, having to go to [place], dealing with investment issue, developed genital warts     Are you feeling depressed? Yes:  " feeling down sometimes    Do you have any concerns with your use of alcohol or other drugs? No     He has had 7 panic attacks in the last 10 days. He has called EMS and was evaluated. He did not have to go to the hospital. He reports that the symptoms will start in his stomach and will worsen and cause him to feel like he can't breath. He will breath through the symptoms and move to his room/ bed. It will last about 10 min.     In the last month and upcoming he has a couple of issues that are causing some stress. He is flying to [place] on 10/3. He has no history of flight anxiety but is worried about developing anxiety while flying. He picked up something from [employer] to help with nausea.     He had to take a drivers written test with establishing residence in [place] from [place]. He passed.     He is also dealing with investment stuff that has been causing anxiety and stress.     He also recently developed an outbreak of genital warts. He is now on imiquimod. He had them treated initially with liquid nitrogen.     Social History     Tobacco Use     Smoking status: Never Smoker     Smokeless tobacco: Never Used   Vaping Use     Vaping Use: Never used   Substance Use Topics     Alcohol use: Yes     Comment: occ     Drug use: Never     MCKENZIE-7 SCORE 9/20/2022   Total Score 8     PHQ 9/20/2022   PHQ-9 Total Score 13   Q9: Thoughts of better off dead/self-harm past 2 weeks Not at all     Last PHQ-9 9/20/2022   1.  Little interest or pleasure in doing things 0   2.  Feeling down, depressed, or hopeless 1   3.  Trouble falling or staying asleep, or sleeping too much 3   4.  Feeling tired or having little energy 2   5.  Poor appetite or overeating 2   6.  Feeling bad about yourself 1   7.  Trouble concentrating 3   8.  Moving slowly or restless 1   Q9: Thoughts of better off dead/self-harm past 2 weeks 0   PHQ-9 Total Score 13   Difficulty at work, home, or with people Not difficult at all     MCKENZIE-7  9/20/2022   1. Feeling  "nervous, anxious, or on edge 1   2. Not being able to stop or control worrying 3   3. Worrying too much about different things 2   4. Trouble relaxing 0   5. Being so restless that it is hard to sit still 0   6. Becoming easily annoyed or irritable 0   7. Feeling afraid, as if something awful might happen 2   MCKENZIE-7 Total Score 8   If you checked any problems, how difficult have they made it for you to do your work, take care of things at home, or get along with other people? Not difficult at all           Review of Systems   Constitutional, HEENT, cardiovascular, pulmonary, gi and gu systems are negative, except as otherwise noted.      Objective    /70 (BP Location: Right arm, Patient Position: Chair, Cuff Size: Adult Large)   Pulse 74   Temp 97.2  F (36.2  C) (Tympanic)   Resp 14   Ht 1.93 m (6' 4\")   Wt 101.4 kg (223 lb 9.6 oz)   SpO2 98%   BMI 27.22 kg/m    Body mass index is 27.22 kg/m .  Physical Exam   GENERAL: healthy, alert and no distress  NECK: no adenopathy, no asymmetry, masses, or scars and thyroid normal to palpation  RESP: lungs clear to auscultation - no rales, rhonchi or wheezes  CV: regular rate and rhythm, normal S1 S2, no S3 or S4, no murmur, click or rub, no peripheral edema and peripheral pulses strong  ABDOMEN: soft, nontender, no hepatosplenomegaly, no masses and bowel sounds normal   (male): testicles normal without atrophy or masses and small cluster of skin tag like warts on lateral aspect of the testicles.   MS: no gross musculoskeletal defects noted, no edema              "

## 2022-10-06 DIAGNOSIS — I50.32 HEART FAILURE, DIASTOLIC, CHRONIC (H): Primary | ICD-10-CM

## 2022-10-07 ENCOUNTER — LAB (OUTPATIENT)
Dept: LAB | Facility: CLINIC | Age: 82
End: 2022-10-07
Payer: MEDICARE

## 2022-10-07 DIAGNOSIS — I50.9 CONGESTIVE HEART FAILURE (H): ICD-10-CM

## 2022-10-07 DIAGNOSIS — E78.5 HYPERLIPIDEMIA LDL GOAL <100: ICD-10-CM

## 2022-10-07 DIAGNOSIS — I50.32 HEART FAILURE, DIASTOLIC, CHRONIC (H): ICD-10-CM

## 2022-10-07 DIAGNOSIS — I12.9 HYPERTENSIVE KIDNEY DISEASE, STAGE III (H): ICD-10-CM

## 2022-10-07 DIAGNOSIS — Z13.220 SCREENING FOR HYPERLIPIDEMIA: ICD-10-CM

## 2022-10-07 DIAGNOSIS — I25.10 CORONARY ARTERIOSCLEROSIS IN NATIVE ARTERY: ICD-10-CM

## 2022-10-07 DIAGNOSIS — N18.30 HYPERTENSIVE KIDNEY DISEASE, STAGE III (H): ICD-10-CM

## 2022-10-07 LAB
ALT SERPL W P-5'-P-CCNC: 32 U/L (ref 10–50)
ANION GAP SERPL CALCULATED.3IONS-SCNC: 13 MMOL/L (ref 7–15)
BUN SERPL-MCNC: 21 MG/DL (ref 8–23)
CALCIUM SERPL-MCNC: 9.3 MG/DL (ref 8.8–10.2)
CHLORIDE SERPL-SCNC: 102 MMOL/L (ref 98–107)
CHOLEST SERPL-MCNC: 140 MG/DL
CREAT SERPL-MCNC: 1.21 MG/DL (ref 0.67–1.17)
CREAT UR-MCNC: 54.7 MG/DL
DEPRECATED HCO3 PLAS-SCNC: 28 MMOL/L (ref 22–29)
ERYTHROCYTE [DISTWIDTH] IN BLOOD BY AUTOMATED COUNT: 13.7 % (ref 10–15)
GFR SERPL CREATININE-BSD FRML MDRD: 59 ML/MIN/1.73M2
GLUCOSE SERPL-MCNC: 116 MG/DL (ref 70–99)
HCT VFR BLD AUTO: 40.8 % (ref 40–53)
HDLC SERPL-MCNC: 48 MG/DL
HGB BLD-MCNC: 13.4 G/DL (ref 13.3–17.7)
LDLC SERPL CALC-MCNC: 56 MG/DL
MCH RBC QN AUTO: 30.7 PG (ref 26.5–33)
MCHC RBC AUTO-ENTMCNC: 32.8 G/DL (ref 31.5–36.5)
MCV RBC AUTO: 94 FL (ref 78–100)
MICROALBUMIN UR-MCNC: <12 MG/L
MICROALBUMIN/CREAT UR: NORMAL MG/G{CREAT}
NONHDLC SERPL-MCNC: 92 MG/DL
PLATELET # BLD AUTO: 173 10E3/UL (ref 150–450)
POTASSIUM SERPL-SCNC: 3.7 MMOL/L (ref 3.4–5.3)
RBC # BLD AUTO: 4.36 10E6/UL (ref 4.4–5.9)
SODIUM SERPL-SCNC: 143 MMOL/L (ref 136–145)
TRIGL SERPL-MCNC: 181 MG/DL
WBC # BLD AUTO: 4.3 10E3/UL (ref 4–11)

## 2022-10-07 PROCEDURE — 80061 LIPID PANEL: CPT

## 2022-10-07 PROCEDURE — 80048 BASIC METABOLIC PNL TOTAL CA: CPT

## 2022-10-07 PROCEDURE — 84460 ALANINE AMINO (ALT) (SGPT): CPT

## 2022-10-07 PROCEDURE — 85027 COMPLETE CBC AUTOMATED: CPT

## 2022-10-07 PROCEDURE — 82043 UR ALBUMIN QUANTITATIVE: CPT

## 2022-10-07 PROCEDURE — 36415 COLL VENOUS BLD VENIPUNCTURE: CPT

## 2023-01-10 ENCOUNTER — APPOINTMENT (RX ONLY)
Dept: URBAN - METROPOLITAN AREA CLINIC 147 | Facility: CLINIC | Age: 83
Setting detail: DERMATOLOGY
End: 2023-01-10

## 2023-01-10 DIAGNOSIS — L30.4 ERYTHEMA INTERTRIGO: ICD-10-CM

## 2023-01-10 DIAGNOSIS — L82.0 INFLAMED SEBORRHEIC KERATOSIS: ICD-10-CM

## 2023-01-10 DIAGNOSIS — A63.0 ANOGENITAL (VENEREAL) WARTS: ICD-10-CM

## 2023-01-10 DIAGNOSIS — L82.1 OTHER SEBORRHEIC KERATOSIS: ICD-10-CM

## 2023-01-10 PROBLEM — D48.5 NEOPLASM OF UNCERTAIN BEHAVIOR OF SKIN: Status: ACTIVE | Noted: 2023-01-10

## 2023-01-10 PROBLEM — D40.8 NEOPLASM OF UNCERTAIN BEHAVIOR OF OTHER SPECIFIED MALE GENITAL ORGANS: Status: ACTIVE | Noted: 2023-01-10

## 2023-01-10 PROCEDURE — ? COUNSELING

## 2023-01-10 PROCEDURE — 11103 TANGNTL BX SKIN EA SEP/ADDL: CPT

## 2023-01-10 PROCEDURE — 99203 OFFICE O/P NEW LOW 30 MIN: CPT | Mod: 25

## 2023-01-10 PROCEDURE — 11102 TANGNTL BX SKIN SINGLE LES: CPT

## 2023-01-10 PROCEDURE — ? BIOPSY BY SHAVE METHOD

## 2023-01-10 PROCEDURE — ? PRESCRIPTION

## 2023-01-10 RX ORDER — HYDROCORTISONE 25 MG/G
CREAM TOPICAL BID
Qty: 30 | Refills: 0 | Status: ERX | COMMUNITY
Start: 2023-01-10

## 2023-01-10 RX ORDER — KETOCONAZOLE 20 MG/G
CREAM TOPICAL
Qty: 60 | Refills: 0 | Status: ERX | COMMUNITY
Start: 2023-01-10

## 2023-01-10 RX ADMIN — KETOCONAZOLE: 20 CREAM TOPICAL at 00:00

## 2023-01-10 RX ADMIN — HYDROCORTISONE: 25 CREAM TOPICAL at 00:00

## 2023-01-10 ASSESSMENT — LOCATION SIMPLE DESCRIPTION DERM
LOCATION SIMPLE: CHEST
LOCATION SIMPLE: RIGHT UPPER BACK
LOCATION SIMPLE: SCROTUM
LOCATION SIMPLE: GLUTEAL CLEFT

## 2023-01-10 ASSESSMENT — LOCATION DETAILED DESCRIPTION DERM
LOCATION DETAILED: GLUTEAL CLEFT
LOCATION DETAILED: RIGHT ANTERIOR SCROTUM
LOCATION DETAILED: LEFT LATERAL SUPERIOR CHEST
LOCATION DETAILED: RIGHT MEDIAL UPPER BACK

## 2023-01-10 ASSESSMENT — LOCATION ZONE DERM
LOCATION ZONE: GENITALIA
LOCATION ZONE: TRUNK

## 2023-01-10 NOTE — PROCEDURE: BIOPSY BY SHAVE METHOD
Detail Level: Detailed
Depth Of Biopsy: dermis
Was A Bandage Applied: Yes
Size Of Lesion In Cm: 0
Biopsy Type: H and E
Biopsy Method: Dermablade
Anesthesia Type: 1% lidocaine with epinephrine
Anesthesia Volume In Cc (Will Not Render If 0): 0.5
Hemostasis: Electrocautery
Wound Care: Petrolatum
Dressing: bandage
Destruction After The Procedure: No
Type Of Destruction Used: Curettage
Curettage Text: The wound bed was treated with curettage after the biopsy was performed.
Cryotherapy Text: The wound bed was treated with cryotherapy after the biopsy was performed.
Electrodesiccation Text: The wound bed was treated with electrodesiccation after the biopsy was performed.
Electrodesiccation And Curettage Text: The wound bed was treated with electrodesiccation and curettage after the biopsy was performed.
Silver Nitrate Text: The wound bed was treated with silver nitrate after the biopsy was performed.
Path Notes (To The Dermatopathologist): Please perform reflex HPV serotyping if positive
Consent: Written consent was obtained and risks were reviewed including but not limited to scarring, infection, bleeding, scabbing, incomplete removal, nerve damage and allergy to anesthesia.
Post-Care Instructions: I reviewed with the patient in detail post-care instructions. Patient is to keep the biopsy site dry overnight, and then apply bacitracin twice daily until healed. Patient may apply hydrogen peroxide soaks to remove any crusting.
Notification Instructions: Patient will be notified of biopsy results. However, patient instructed to call the office if not contacted within 2 weeks.
Billing Type: Third-Party Bill
Information: Selecting Yes will display possible errors in your note based on the variables you have selected. This validation is only offered as a suggestion for you. PLEASE NOTE THAT THE VALIDATION TEXT WILL BE REMOVED WHEN YOU FINALIZE YOUR NOTE. IF YOU WANT TO FAX A PRELIMINARY NOTE YOU WILL NEED TO TOGGLE THIS TO 'NO' IF YOU DO NOT WANT IT IN YOUR FAXED NOTE.
Hemostasis: Drysol

## 2023-02-15 ENCOUNTER — APPOINTMENT (RX ONLY)
Dept: URBAN - METROPOLITAN AREA CLINIC 147 | Facility: CLINIC | Age: 83
Setting detail: DERMATOLOGY
End: 2023-02-15

## 2023-02-15 DIAGNOSIS — L82.1 OTHER SEBORRHEIC KERATOSIS: ICD-10-CM

## 2023-02-15 DIAGNOSIS — L30.4 ERYTHEMA INTERTRIGO: ICD-10-CM | Status: RESOLVED

## 2023-02-15 DIAGNOSIS — D22 MELANOCYTIC NEVI: ICD-10-CM

## 2023-02-15 DIAGNOSIS — L82.0 INFLAMED SEBORRHEIC KERATOSIS: ICD-10-CM

## 2023-02-15 PROBLEM — D48.5 NEOPLASM OF UNCERTAIN BEHAVIOR OF SKIN: Status: ACTIVE | Noted: 2023-02-15

## 2023-02-15 PROCEDURE — 17110 DESTRUCTION B9 LES UP TO 14: CPT

## 2023-02-15 PROCEDURE — ? PRESCRIPTION MEDICATION MANAGEMENT

## 2023-02-15 PROCEDURE — ? PATHOLOGY DISCUSSION

## 2023-02-15 PROCEDURE — 99213 OFFICE O/P EST LOW 20 MIN: CPT | Mod: 25

## 2023-02-15 PROCEDURE — ? BIOPSY BY SHAVE METHOD

## 2023-02-15 PROCEDURE — ? ADDITIONAL NOTES

## 2023-02-15 PROCEDURE — ? LIQUID NITROGEN

## 2023-02-15 PROCEDURE — 11102 TANGNTL BX SKIN SINGLE LES: CPT | Mod: 59

## 2023-02-15 PROCEDURE — ? COUNSELING

## 2023-02-15 ASSESSMENT — LOCATION ZONE DERM
LOCATION ZONE: GENITALIA
LOCATION ZONE: LEG
LOCATION ZONE: TRUNK

## 2023-02-15 ASSESSMENT — LOCATION SIMPLE DESCRIPTION DERM
LOCATION SIMPLE: SCROTUM
LOCATION SIMPLE: CHEST
LOCATION SIMPLE: LEFT CALF
LOCATION SIMPLE: LEFT ANKLE
LOCATION SIMPLE: GLUTEAL CLEFT
LOCATION SIMPLE: RIGHT CALF

## 2023-02-15 ASSESSMENT — LOCATION DETAILED DESCRIPTION DERM
LOCATION DETAILED: GLUTEAL CLEFT
LOCATION DETAILED: RIGHT ANTERIOR SCROTUM
LOCATION DETAILED: LEFT LATERAL SUPERIOR CHEST
LOCATION DETAILED: RIGHT DISTAL LATERAL CALF
LOCATION DETAILED: LEFT POSTERIOR ANKLE
LOCATION DETAILED: LEFT DISTAL CALF

## 2023-02-15 NOTE — PROCEDURE: LIQUID NITROGEN
Show Aperture Variable?: Yes
Spray Paint Technique: No
Post-Care Instructions: I reviewed with the patient in detail post-care instructions. Patient is to wear sunprotection, and avoid picking at any of the treated lesions. Pt may apply Vaseline to crusted or scabbing areas.
Spray Paint Text: The liquid nitrogen was applied to the skin utilizing a spray paint frosting technique.
Medical Necessity Clause: This procedure was medically necessary because the lesions that were treated were:
Consent: The patient's consent was obtained including but not limited to risks of crusting, scabbing, blistering, scarring, darker or lighter pigmentary change, recurrence, incomplete removal and infection.
Medical Necessity Information: It is in your best interest to select a reason for this procedure from the list below. All of these items fulfill various CMS LCD requirements except the new and changing color options.
Detail Level: Zone

## 2023-02-15 NOTE — PROCEDURE: ADDITIONAL NOTES
Render Risk Assessment In Note?: no
Additional Notes: HPV not detected
Detail Level: Simple
Additional Notes: Declined LN2

## 2023-02-15 NOTE — PROCEDURE: PRESCRIPTION MEDICATION MANAGEMENT
Detail Level: Zone
Render In Strict Bullet Format?: No
Discontinue Regimen: Hydrocortisone
Continue Regimen: Ketoconazole

## 2023-03-15 ENCOUNTER — APPOINTMENT (RX ONLY)
Dept: URBAN - METROPOLITAN AREA CLINIC 147 | Facility: CLINIC | Age: 83
Setting detail: DERMATOLOGY
End: 2023-03-15

## 2023-03-15 DIAGNOSIS — D18.0 HEMANGIOMA: ICD-10-CM

## 2023-03-15 DIAGNOSIS — L82.1 OTHER SEBORRHEIC KERATOSIS: ICD-10-CM

## 2023-03-15 DIAGNOSIS — L81.4 OTHER MELANIN HYPERPIGMENTATION: ICD-10-CM

## 2023-03-15 DIAGNOSIS — D22 MELANOCYTIC NEVI: ICD-10-CM

## 2023-03-15 DIAGNOSIS — L57.0 ACTINIC KERATOSIS: ICD-10-CM

## 2023-03-15 DIAGNOSIS — Z85.820 PERSONAL HISTORY OF MALIGNANT MELANOMA OF SKIN: ICD-10-CM

## 2023-03-15 PROBLEM — D18.01 HEMANGIOMA OF SKIN AND SUBCUTANEOUS TISSUE: Status: ACTIVE | Noted: 2023-03-15

## 2023-03-15 PROBLEM — D48.5 NEOPLASM OF UNCERTAIN BEHAVIOR OF SKIN: Status: ACTIVE | Noted: 2023-03-15

## 2023-03-15 PROBLEM — D22.5 MELANOCYTIC NEVI OF TRUNK: Status: ACTIVE | Noted: 2023-03-15

## 2023-03-15 PROCEDURE — ? COUNSELING

## 2023-03-15 PROCEDURE — ? TREATMENT REGIMEN

## 2023-03-15 PROCEDURE — ? SUNSCREEN RECOMMENDATIONS

## 2023-03-15 PROCEDURE — 11102 TANGNTL BX SKIN SINGLE LES: CPT

## 2023-03-15 PROCEDURE — ? FULL BODY SKIN EXAM

## 2023-03-15 PROCEDURE — ? BIOPSY BY SHAVE METHOD

## 2023-03-15 PROCEDURE — 17003 DESTRUCT PREMALG LES 2-14: CPT

## 2023-03-15 PROCEDURE — ? LIQUID NITROGEN

## 2023-03-15 PROCEDURE — 17000 DESTRUCT PREMALG LESION: CPT | Mod: 59

## 2023-03-15 PROCEDURE — 99213 OFFICE O/P EST LOW 20 MIN: CPT | Mod: 25

## 2023-03-15 PROCEDURE — ? ADDITIONAL NOTES

## 2023-03-15 ASSESSMENT — LOCATION DETAILED DESCRIPTION DERM
LOCATION DETAILED: RIGHT ANTIHELIX
LOCATION DETAILED: RIGHT MEDIAL UPPER BACK
LOCATION DETAILED: RIGHT VENTRAL PROXIMAL FOREARM
LOCATION DETAILED: LEFT DISTAL DORSAL FOREARM
LOCATION DETAILED: RIGHT ULNAR DORSAL HAND
LOCATION DETAILED: INFERIOR THORACIC SPINE
LOCATION DETAILED: RIGHT DISTAL DORSAL FOREARM
LOCATION DETAILED: RIGHT LATERAL FOREHEAD
LOCATION DETAILED: LEFT SUPERIOR LATERAL MALAR CHEEK
LOCATION DETAILED: LEFT VENTRAL PROXIMAL FOREARM
LOCATION DETAILED: LEFT FOREHEAD
LOCATION DETAILED: STERNAL NOTCH
LOCATION DETAILED: RIGHT PROXIMAL DORSAL FOREARM
LOCATION DETAILED: RIGHT SUPERIOR LATERAL MALAR CHEEK
LOCATION DETAILED: RIGHT ANTERIOR SHOULDER
LOCATION DETAILED: LEFT PROXIMAL DORSAL FOREARM
LOCATION DETAILED: PERIUMBILICAL SKIN
LOCATION DETAILED: LEFT INFERIOR FOREHEAD

## 2023-03-15 ASSESSMENT — LOCATION SIMPLE DESCRIPTION DERM
LOCATION SIMPLE: UPPER BACK
LOCATION SIMPLE: RIGHT UPPER BACK
LOCATION SIMPLE: RIGHT FOREARM
LOCATION SIMPLE: RIGHT HAND
LOCATION SIMPLE: LEFT FOREHEAD
LOCATION SIMPLE: RIGHT EAR
LOCATION SIMPLE: RIGHT CHEEK
LOCATION SIMPLE: RIGHT FOREHEAD
LOCATION SIMPLE: ABDOMEN
LOCATION SIMPLE: LEFT FOREARM
LOCATION SIMPLE: LEFT CHEEK
LOCATION SIMPLE: RIGHT SHOULDER
LOCATION SIMPLE: CHEST

## 2023-03-15 ASSESSMENT — LOCATION ZONE DERM
LOCATION ZONE: ARM
LOCATION ZONE: HAND
LOCATION ZONE: FACE
LOCATION ZONE: EAR
LOCATION ZONE: TRUNK

## 2023-03-15 NOTE — PROCEDURE: ADDITIONAL NOTES
Render Risk Assessment In Note?: no
Detail Level: Simple
Additional Notes: Patient reports swelling to right side of neck 2 weeks ago that has since resolved. advised to f/u with pcp for evaluation. no palpable lymph nodes noted on todays exam.

## 2023-03-15 NOTE — PROCEDURE: COUNSELING
When Should The Patient Follow-Up For Their Next Full-Body Skin Exam?: 3 Months
Quality 137: Melanoma: Continuity Of Care - Recall System: Patient information entered into a recall system that includes: target date for the next exam specified AND a process to follow up with patients regarding missed or unscheduled appointments
Detail Level: Zone
Detail Level: Generalized

## 2023-03-23 ENCOUNTER — APPOINTMENT (RX ONLY)
Dept: URBAN - METROPOLITAN AREA CLINIC 147 | Facility: CLINIC | Age: 83
Setting detail: DERMATOLOGY
End: 2023-03-23

## 2023-03-23 PROBLEM — D03.62 MELANOMA IN SITU OF LEFT UPPER LIMB, INCLUDING SHOULDER: Status: ACTIVE | Noted: 2023-03-23

## 2023-03-23 PROCEDURE — ? COUNSELING

## 2023-03-23 PROCEDURE — 99213 OFFICE O/P EST LOW 20 MIN: CPT | Mod: 24

## 2023-03-23 PROCEDURE — ? PATHOLOGY DISCUSSION

## 2023-03-23 PROCEDURE — ? DEFER

## 2023-03-23 PROCEDURE — ? ADDITIONAL NOTES

## 2023-03-23 NOTE — PROCEDURE: DEFER
Procedure To Be Performed At Next Visit: Excision
Instructions (Optional): With Dr. Avendaño
X Size Of Lesion In Cm (Optional): 0
Detail Level: Detailed
Introduction Text (Please End With A Colon): The following procedure was deferred:

## 2023-03-23 NOTE — PROCEDURE: ADDITIONAL NOTES
Additional Notes: Discussed with patient results. Patient is aware that delaying treatment will increase risk of metastasis to surrounding areas/ other parts of the body and death. Patient is aware that these risks can be observed also in Latin nets with or without treatment. Patient was instructed to return for a full body skin exam every 3 months. Patient advised yearly eye exam and dental exam. Biopsy results provided to patient. Will notify PCP.\\n\\nAdvised patient to follow up with dermatologist back home q 3 months for full body skin exams.
Detail Level: Simple
Render Risk Assessment In Note?: no

## 2023-03-27 ENCOUNTER — RX ONLY (OUTPATIENT)
Age: 83
Setting detail: RX ONLY
End: 2023-03-27

## 2023-03-27 RX ORDER — CEPHALEXIN 500 MG/1
CAPSULE ORAL
Qty: 4 | Refills: 0 | Status: ERX | COMMUNITY
Start: 2023-03-27

## 2023-04-11 ENCOUNTER — APPOINTMENT (RX ONLY)
Dept: URBAN - METROPOLITAN AREA CLINIC 147 | Facility: CLINIC | Age: 83
Setting detail: DERMATOLOGY
End: 2023-04-11

## 2023-04-11 DIAGNOSIS — L57.0 ACTINIC KERATOSIS: ICD-10-CM

## 2023-04-11 DIAGNOSIS — L72.0 EPIDERMAL CYST: ICD-10-CM

## 2023-04-11 PROCEDURE — 17003 DESTRUCT PREMALG LES 2-14: CPT

## 2023-04-11 PROCEDURE — ? LIQUID NITROGEN

## 2023-04-11 PROCEDURE — 10040 EXTRACTION: CPT

## 2023-04-11 PROCEDURE — ? COUNSELING

## 2023-04-11 PROCEDURE — 17000 DESTRUCT PREMALG LESION: CPT

## 2023-04-11 PROCEDURE — ? ACNE SURGERY

## 2023-04-11 ASSESSMENT — LOCATION ZONE DERM
LOCATION ZONE: NECK
LOCATION ZONE: EAR
LOCATION ZONE: ARM
LOCATION ZONE: FACE

## 2023-04-11 ASSESSMENT — LOCATION DETAILED DESCRIPTION DERM
LOCATION DETAILED: LEFT SUPERIOR LATERAL NECK
LOCATION DETAILED: RIGHT VENTRAL PROXIMAL FOREARM
LOCATION DETAILED: RIGHT ANTITRAGUS
LOCATION DETAILED: LEFT INFERIOR PREAURICULAR CHEEK
LOCATION DETAILED: LEFT PROXIMAL LATERAL POSTERIOR UPPER ARM

## 2023-04-11 ASSESSMENT — LOCATION SIMPLE DESCRIPTION DERM
LOCATION SIMPLE: NECK
LOCATION SIMPLE: RIGHT EAR
LOCATION SIMPLE: RIGHT FOREARM
LOCATION SIMPLE: LEFT UPPER ARM
LOCATION SIMPLE: LEFT CHEEK

## 2023-04-11 NOTE — PROCEDURE: ACNE SURGERY
Post-Care Instructions: I reviewed with the patient in detail post-care instructions. Patient is to keep the treatment areas dry overnight, and then apply bacitracin twice daily until healed. Patient may apply hydrogen peroxide soaks to remove any crusting.
Extraction Method: 18 gauge needle, cotton-tipped applicators and gentle lateral pressure
Render Number Of Lesions Treated: no
Consent was obtained and risks were reviewed including but not limited to scarring, infection, bleeding, scabbing, incomplete removal, and allergy to anesthesia.
Detail Level: Zone
Acne Type: Comedonal Lesions
Prep Text (Optional): Prior to removal the treatment areas were prepped in the usual fashion.

## 2023-04-13 ENCOUNTER — APPOINTMENT (RX ONLY)
Dept: URBAN - METROPOLITAN AREA CLINIC 147 | Facility: CLINIC | Age: 83
Setting detail: DERMATOLOGY
End: 2023-04-13

## 2023-04-13 PROBLEM — D03.62 MELANOMA IN SITU OF LEFT UPPER LIMB, INCLUDING SHOULDER: Status: ACTIVE | Noted: 2023-04-13

## 2023-04-13 PROCEDURE — 13121 CMPLX RPR S/A/L 2.6-7.5 CM: CPT | Mod: 79

## 2023-04-13 PROCEDURE — 11603 EXC TR-EXT MAL+MARG 2.1-3 CM: CPT | Mod: 79

## 2023-04-13 PROCEDURE — ? EXCISION

## 2023-04-13 NOTE — PROCEDURE: EXCISION
Septic note  Vital signs reviewed.      Visit Vitals  /58 (BP Location: RUE - Right upper extremity, Patient Position: Semi-Ownes's)   Pulse 97   Temp 99.1 °F (37.3 °C) (Axillary)   Resp 18   Ht 5' 6\" (1.676 m)   Wt 64.4 kg (141 lb 15.6 oz)   SpO2 94%   BMI 22.92 kg/m²     Focus sepsis note  Epic chat message sent to RN and Attending physician   Received message back from--Tati Nava RN and Tomi Ruiz MD  Instructed them that a huddle must be completed in both instances. If patient septic they need to check the box of septic diagnosis and then open order set. If not septic check treating other etiology and alert should shut off for 24 hrs. All questions have been answered and other party has agreed to perform huddle then chart result from huddle.   Had long discussion with nursing both on and off epic chat in regards to the sepsis triggers, protocol and follow through. Education was needed to understand why the BPA triggered independent of any information being entered.  After this was completed the nurse stated that she felt as if she understood much better.   Kamini COHEN  [facility] Hospitalist    [phone] then call on call Hospitalist              Complex Repair And Xenograft Text: The defect edges were debeveled with a #15 scalpel blade.  The primary defect was closed partially with a complex linear closure.  Given the location of the defect, shape of the defect and the proximity to free margins a xenograft was deemed most appropriate to repair the remaining defect.  The graft was trimmed to fit the size of the remaining defect.  The graft was then placed in the primary defect, oriented appropriately, and sutured into place.

## 2023-04-13 NOTE — PROCEDURE: EXCISION
Spoke with pt's spouse. Advised her pt needs to report to ED for eval. She agreed with plan.    Dr. Mcclure notified.  ----- Message from Angelo Shanks sent at 6/13/2022  8:55 AM CDT -----  Regarding: speak to nurse  Contact: Leonarda  Patient's spouse is calling to state that patient is still not feeling well. Abdominal pain, vomiting, 99.9 temp., still taking Tylenol but that has not relieved any discomfort. Requesting a call back.    Contact: [phone]        O-L Flap Text: The defect edges were debeveled with a #15 scalpel blade.  Given the location of the defect, shape of the defect and the proximity to free margins an O-L flap was deemed most appropriate.  Using a sterile surgical marker, an appropriate advancement flap was drawn incorporating the defect and placing the expected incisions within the relaxed skin tension lines where possible.    The area thus outlined was incised deep to adipose tissue with a #15 scalpel blade.  The skin margins were undermined to an appropriate distance in all directions utilizing iris scissors.

## 2023-04-25 ENCOUNTER — TELEPHONE (OUTPATIENT)
Dept: FAMILY MEDICINE | Facility: CLINIC | Age: 83
End: 2023-04-25
Payer: MEDICARE

## 2023-04-25 NOTE — TELEPHONE ENCOUNTER
I recommend an appointment for follow up regarding both these concerns. If due for preventative care this can be done at this visit as well.   BENY Davis CNP

## 2023-04-25 NOTE — TELEPHONE ENCOUNTER
Patient has never had an Annual Medicare Wellness visit.     Lili Polk RN on 4/25/2023 at 2:54 PM

## 2023-04-25 NOTE — TELEPHONE ENCOUNTER
"Patient calling clinic because he had some appointments yesterday at [facility]. Patient wants to follow up with Sophia Mendez about the recommendation that was copy and pasted from yesterday's [facility] visit. His main question was about the \"iron\" studies. \"      BENY Steinberg, [email or web address]., M.S.N.  Nurse Practitioner  Specialty:  Cardiology  Patient Instructions     Addendum  Encounter Date:  4/24/2023  Note Received:  4/25/2023 11:34 AM         Addendum        Have your primary provider do iron studies to assure low iron isn't contributing to your sleepiness and talk with them about the anxiety and panic attacks.       Drink 6-8 cups of fluid per day.  See if your lightheadedness gets better with more fluid.     See me late October or early November.  We will get an echocardiogram then.                  Electronically signed by BENY Steinberg, C.N.P., M.S.N. at 4/24/2023 11:48 AM       Patient requesting call back to find out what Sophia Mendez's recommendations are. Patient can be called back at [phone].  Patient reports he is willing to schedule an appointment for follow up if recommended.     Forwarding to PCP    Lili Polk RN on 4/25/2023 at 11:39 AM      "

## 2023-05-02 ENCOUNTER — LAB (OUTPATIENT)
Dept: LAB | Facility: CLINIC | Age: 83
End: 2023-05-02
Payer: MEDICARE

## 2023-05-02 ENCOUNTER — OFFICE VISIT (OUTPATIENT)
Dept: FAMILY MEDICINE | Facility: CLINIC | Age: 83
End: 2023-05-02
Payer: MEDICARE

## 2023-05-02 VITALS
HEART RATE: 81 BPM | SYSTOLIC BLOOD PRESSURE: 126 MMHG | BODY MASS INDEX: 26.92 KG/M2 | DIASTOLIC BLOOD PRESSURE: 54 MMHG | RESPIRATION RATE: 12 BRPM | TEMPERATURE: 96.7 F | HEIGHT: 77 IN | OXYGEN SATURATION: 98 % | WEIGHT: 228 LBS

## 2023-05-02 DIAGNOSIS — I12.9 HYPERTENSIVE KIDNEY DISEASE, STAGE III (H): ICD-10-CM

## 2023-05-02 DIAGNOSIS — N18.30 HYPERTENSIVE KIDNEY DISEASE, STAGE III (H): ICD-10-CM

## 2023-05-02 DIAGNOSIS — E85.89 AMYLOIDOGENIC TRANSTHYRETIN AMYLOIDOSIS (H): ICD-10-CM

## 2023-05-02 DIAGNOSIS — I71.9 AORTIC ANEURYSM WITHOUT RUPTURE, UNSPECIFIED PORTION OF AORTA (H): ICD-10-CM

## 2023-05-02 DIAGNOSIS — D50.9 IRON DEFICIENCY ANEMIA, UNSPECIFIED IRON DEFICIENCY ANEMIA TYPE: ICD-10-CM

## 2023-05-02 DIAGNOSIS — E78.5 HYPERLIPIDEMIA LDL GOAL <100: ICD-10-CM

## 2023-05-02 DIAGNOSIS — R97.20 ELEVATED PROSTATE SPECIFIC ANTIGEN (PSA): ICD-10-CM

## 2023-05-02 DIAGNOSIS — I50.32 CHRONIC DIASTOLIC HEART FAILURE (H): ICD-10-CM

## 2023-05-02 DIAGNOSIS — Z00.00 ENCOUNTER FOR MEDICARE ANNUAL WELLNESS EXAM: Primary | ICD-10-CM

## 2023-05-02 DIAGNOSIS — J84.9 INTERSTITIAL LUNG DISEASE (H): ICD-10-CM

## 2023-05-02 DIAGNOSIS — I48.0 PAROXYSMAL ATRIAL FIBRILLATION (H): ICD-10-CM

## 2023-05-02 DIAGNOSIS — I20.89 STABLE ANGINA PECTORIS (H): ICD-10-CM

## 2023-05-02 DIAGNOSIS — R97.20 ELEVATED PROSTATE SPECIFIC ANTIGEN (PSA): Primary | ICD-10-CM

## 2023-05-02 DIAGNOSIS — N18.31 STAGE 3A CHRONIC KIDNEY DISEASE (H): ICD-10-CM

## 2023-05-02 LAB
ALBUMIN UR-MCNC: NEGATIVE MG/DL
ANION GAP SERPL CALCULATED.3IONS-SCNC: 12 MMOL/L (ref 7–15)
APPEARANCE UR: CLEAR
BILIRUB UR QL STRIP: NEGATIVE
BUN SERPL-MCNC: 17.4 MG/DL (ref 8–23)
CALCIUM SERPL-MCNC: 10.4 MG/DL (ref 8.8–10.2)
CHLORIDE SERPL-SCNC: 105 MMOL/L (ref 98–107)
CHOLEST SERPL-MCNC: 124 MG/DL
COLOR UR AUTO: YELLOW
CREAT SERPL-MCNC: 1.19 MG/DL (ref 0.67–1.17)
DEPRECATED HCO3 PLAS-SCNC: 28 MMOL/L (ref 22–29)
ERYTHROCYTE [DISTWIDTH] IN BLOOD BY AUTOMATED COUNT: 13.6 % (ref 10–15)
FERRITIN SERPL-MCNC: 253 NG/ML (ref 31–409)
GFR SERPL CREATININE-BSD FRML MDRD: 60 ML/MIN/1.73M2
GLUCOSE SERPL-MCNC: 137 MG/DL (ref 70–99)
GLUCOSE UR STRIP-MCNC: NEGATIVE MG/DL
HCT VFR BLD AUTO: 41.7 % (ref 40–53)
HDLC SERPL-MCNC: 53 MG/DL
HGB BLD-MCNC: 13.6 G/DL (ref 13.3–17.7)
HGB UR QL STRIP: NEGATIVE
IRON BINDING CAPACITY (ROCHE): 256 UG/DL (ref 240–430)
IRON SATN MFR SERPL: 30 % (ref 15–46)
IRON SERPL-MCNC: 77 UG/DL (ref 61–157)
KETONES UR STRIP-MCNC: NEGATIVE MG/DL
LDLC SERPL CALC-MCNC: 57 MG/DL
LEUKOCYTE ESTERASE UR QL STRIP: NEGATIVE
MCH RBC QN AUTO: 31.3 PG (ref 26.5–33)
MCHC RBC AUTO-ENTMCNC: 32.6 G/DL (ref 31.5–36.5)
MCV RBC AUTO: 96 FL (ref 78–100)
NITRATE UR QL: NEGATIVE
NONHDLC SERPL-MCNC: 71 MG/DL
PH UR STRIP: 6 [PH] (ref 5–7)
PLATELET # BLD AUTO: 211 10E3/UL (ref 150–450)
POTASSIUM SERPL-SCNC: 4.2 MMOL/L (ref 3.4–5.3)
RBC # BLD AUTO: 4.35 10E6/UL (ref 4.4–5.9)
SODIUM SERPL-SCNC: 145 MMOL/L (ref 136–145)
SP GR UR STRIP: 1.01 (ref 1–1.03)
TRANSFERRIN SERPL-MCNC: 212 MG/DL (ref 200–360)
TRIGL SERPL-MCNC: 69 MG/DL
TSH SERPL DL<=0.005 MIU/L-ACNC: 0.9 UIU/ML (ref 0.3–4.2)
UROBILINOGEN UR STRIP-ACNC: 0.2 E.U./DL
WBC # BLD AUTO: 5.4 10E3/UL (ref 4–11)

## 2023-05-02 PROCEDURE — 83540 ASSAY OF IRON: CPT

## 2023-05-02 PROCEDURE — 80048 BASIC METABOLIC PNL TOTAL CA: CPT

## 2023-05-02 PROCEDURE — 99214 OFFICE O/P EST MOD 30 MIN: CPT | Mod: 25 | Performed by: NURSE PRACTITIONER

## 2023-05-02 PROCEDURE — 81003 URINALYSIS AUTO W/O SCOPE: CPT

## 2023-05-02 PROCEDURE — 85027 COMPLETE CBC AUTOMATED: CPT

## 2023-05-02 PROCEDURE — 36415 COLL VENOUS BLD VENIPUNCTURE: CPT

## 2023-05-02 PROCEDURE — 84466 ASSAY OF TRANSFERRIN: CPT

## 2023-05-02 PROCEDURE — G0438 PPPS, INITIAL VISIT: HCPCS | Performed by: NURSE PRACTITIONER

## 2023-05-02 PROCEDURE — 84443 ASSAY THYROID STIM HORMONE: CPT

## 2023-05-02 PROCEDURE — 82728 ASSAY OF FERRITIN: CPT

## 2023-05-02 PROCEDURE — 80061 LIPID PANEL: CPT

## 2023-05-02 PROCEDURE — 84153 ASSAY OF PSA TOTAL: CPT

## 2023-05-02 RX ORDER — ESCITALOPRAM OXALATE 5 MG/1
10 TABLET ORAL DAILY
COMMUNITY
End: 2024-05-06

## 2023-05-02 ASSESSMENT — ENCOUNTER SYMPTOMS
CHILLS: 0
NAUSEA: 0
HEMATOCHEZIA: 0
FEVER: 0
COUGH: 1
EYE PAIN: 0
SORE THROAT: 0
PALPITATIONS: 0
DYSURIA: 0
ABDOMINAL PAIN: 0
DIARRHEA: 0
HEADACHES: 0
WEAKNESS: 0
DIZZINESS: 1
ARTHRALGIAS: 1
SHORTNESS OF BREATH: 1
JOINT SWELLING: 0
HEMATURIA: 0
PARESTHESIAS: 0
NERVOUS/ANXIOUS: 1
HEARTBURN: 0
FREQUENCY: 1
CONSTIPATION: 0
MYALGIAS: 0

## 2023-05-02 ASSESSMENT — ACTIVITIES OF DAILY LIVING (ADL)
CURRENT_FUNCTION: TELEPHONE REQUIRES ASSISTANCE
CURRENT_FUNCTION: MEDICATION ADMINISTRATION REQUIRES ASSISTANCE

## 2023-05-02 ASSESSMENT — PAIN SCALES - GENERAL: PAINLEVEL: NO PAIN (0)

## 2023-05-02 NOTE — PATIENT INSTRUCTIONS
Patient Education   Personalized Prevention Plan  You are due for the preventive services outlined below.  Your care team is available to assist you in scheduling these services.  If you have already completed any of these items, please share that information with your care team to update in your medical record.  Health Maintenance Due   Topic Date Due     Heart Failure Action Plan  Never done     Annual Wellness Visit  Never done     Thyroid Function Lab  03/17/2016     Zoster (Shingles) Vaccine (2 of 2) 04/27/2020     Basic Metabolic Panel  04/07/2023     Your Health Risk Assessment indicates you feel you are not in good health    A healthy lifestyle helps keep the body fit and the mind alert. It helps protect you from disease, helps you fight disease, and helps prevent chronic disease (disease that doesn't go away) from getting worse. This is important as you get older and begin to notice twinges in muscles and joints and a decline in the strength and stamina you once took for granted. A healthy lifestyle includes good healthcare, good nutrition, weight control, recreation, and regular exercise. Avoid harmful substances and do what you can to keep safe. Another part of a healthy lifestyle is stay mentally active and socially involved.    Good healthcare     Have a wellness visit every year.     If you have new symptoms, let us know right away. Don't wait until the next checkup.     Take medicines exactly as prescribed and keep your medicines in a safe place. Tell us if your medicine causes problems.   Healthy diet and weight control     Eat 3 or 4 small, nutritious, low-fat, high-fiber meals a day. Include a variety of fruits, vegetables, and whole-grain foods.     Make sure you get enough calcium in your diet. Calcium, vitamin D, and exercise help prevent osteoporosis (bone thinning).     If you live alone, try eating with others when you can. That way you get a good meal and have company while you eat it.      Try to keep a healthy weight. If you eat more calories than your body uses for energy, it will be stored as fat and you will gain weight.     Recreation   Recreation is not limited to sports and team events. It includes any activity that provides relaxation, interest, enjoyment, and exercise. Recreation provides an outlet for physical, mental, and social energy. It can give a sense of worth and achievement. It can help you stay healthy.    Mental Exercise and Social Involvement  Mental and emotional health is as important as physical health. Keep in touch with friends and family. Stay as active as possible. Continue to learn and challenge yourself.   Things you can do to stay mentally active are:    Learn something new, like a foreign language or musical instrument.     Play SCRABBLE or do crossword puzzles. If you cannot find people to play these games with you at home, you can play them with others on your computer through the Internet.     Join a games club--anything from card games to chess or checkers or lawn bowling.     Start a new hobby.     Go back to school.     Volunteer.     Read.   Keep up with world events.  Activities of Daily Living    Your Health Risk Assessment indicates you have difficulties with activities of daily living such as housework, bathing, preparing meals, taking medication, etc. Please make a follow up appointment for us to address this issue in more detail.    Signs of Hearing Loss  Hearing loss is a problem shared by many people. In fact, it's one of the most common health problems, particularly as people age. Most people aged 65 and older have some hearing loss. By age 80, almost everyone does. Hearing loss often occurs slowly over the years. So, you may not realize your hearing has gotten worse.   When sudden hearing loss occurs, it's important to contact your healthcare provider right away. Your provider will do a medical exam and a hearing exam as soon as possible. This is to  help find the cause and type of your sudden hearing loss. Based on your diagnosis, your healthcare provider will discuss possible treatments.      Hearing much better with one ear can be a sign of hearing loss.     Have your hearing checked  Call your healthcare provider if you:     Have to strain to hear normal conversation    Have to watch other people s faces very carefully to follow what they re saying    Need to ask people to repeat what they ve said    Often misunderstand what people are saying    Turn the volume of the television or radio up so high that others complain    Feel that people are mumbling when they re talking to you    Find that the effort to hear leaves you feeling tired and irritated    Notice, when using the phone, that you hear better with one ear than the other  [employer] last reviewed this educational content on 6/1/2022 2000-2022 [employer]. All rights reserved. This information is not intended as a substitute for professional medical care. Always follow your healthcare professional's instructions.        Your Health Risk Assessment indicates you feel you are not in good emotional health.    Recreation   Recreation is not limited to sports and team events. It includes any activity that provides relaxation, interest, enjoyment, and exercise. Recreation provides an outlet for physical, mental, and social energy. It can give a sense of worth and achievement. It can help you stay healthy.    Mental Exercise and Social Involvement  Mental and emotional health is as important as physical health. Keep in touch with friends and family. Stay as active as possible. Continue to learn and challenge yourself.   Things you can do to stay mentally active are:    Learn something new, like a foreign language or musical instrument.     Play SCRABBLE or do crossword puzzles. If you cannot find people to play these games with you at home, you can play them with others on your computer  through the Internet.     Join a games club--anything from card games to chess or checkers or lawn bowling.     Start a new hobby.     Go back to school.     Volunteer.     Read.   Keep up with world events.

## 2023-05-02 NOTE — PROGRESS NOTES
"SUBJECTIVE:   Homer is a 83 year old who presents for Preventive Visit.      5/2/2023     9:56 AM   Additional Questions   Roomed by Dina ZAMUDIO   Accompanied by self   Patient has been advised of split billing requirements and indicates understanding: Yes  Are you in the first 12 months of your Medicare coverage?  No    Healthy Habits:     In general, how would you rate your overall health?  Fair    Frequency of exercise:  2-3 days/week    Duration of exercise:  Less than 15 minutes    Do you usually eat at least 4 servings of fruit and vegetables a day, include whole grains    & fiber and avoid regularly eating high fat or \"junk\" foods?  Yes    Taking medications regularly:  No    Barriers to taking medications:  None and Side effects    Medication side effects:  Lightheadedness    Ability to successfully perform activities of daily living:  Telephone requires assistance and medication administration requires assistance    Home Safety:  Throw rugs in the hallway and lack of grab bars in the bathroom    Hearing Impairment:  Difficulty following a conversation in a noisy restaurant or crowded room, feel that people are mumbling or not speaking clearly, difficulty following dialogue in the theater, difficult to understand a speaker at a public meeting or Jain service, need to ask people to speak up or repeat themselves, difficulty understanding soft or whispered speech and difficulty understanding speech on the telephone    In the past 6 months, have you been bothered by leaking of urine?  No    In general, how would you rate your overall mental or emotional health?  Fair      PHQ-2 Total Score: 0    Additional concerns today:  Yes    Patient states that he has been having dizziness for years. He experiences it when standing up. Hasn't fallen, but comes close. He checks his blood pressure at home. Sometimes gets blood pressures that are slightly higher than 140/90. He is following a low salt diet. Panic attacks have " been improving. He is up here in [place] now for the summer. He returns to [place] in November and returns to [place] at the end of April.     Patient would like to discuss the iron study that he is participating in. [facility] would like [facility] to get an additional PSA test.    He follows closely with cardiology and internal medicine. He has a cardiologist at [place] and in [place]. His IM doctor is also in [place].     He overall if feeling well. He brings in recent labs that were done but would like to repeat some blood work as well. He mentions that his PSA was elevated. He wants too look at this more closely and determine if any referrals are needed.     Have you ever done Advance Care Planning? (For example, a Health Directive, POLST, or a discussion with a medical provider or your loved ones about your wishes): Yes, patient states has an Advance Care Planning document and will bring a copy to the clinic.       Fall risk  Fallen 2 or more times in the past year?: No  Any fall with injury in the past year?: No    Cognitive Screening   1) Repeat 3 items (Leader, Season, Table)    2) Clock draw: NORMAL  3) 3 item recall: Recalls NO objects   Results: 0 items recalled: PROBABLE COGNITIVE IMPAIRMENT, **INFORM PROVIDER**    Mini-CogTM Copyright S Caitlyn. Licensed by the author for use in [facility]; reprinted with permission (juan [email or web address]). All rights reserved.      Do you have sleep apnea, excessive snoring or daytime drowsiness?: daytime drowsiness    Reviewed and updated as needed this visit by clinical staff   Tobacco  Allergies  Meds  Problems  Med Hx  Surg Hx  Fam Hx          Reviewed and updated as needed this visit by Provider                 Social History     Tobacco Use     Smoking status: Never     Smokeless tobacco: Never   Vaping Use     Vaping status: Never Used     Passive vaping exposure: Yes   Substance Use Topics     Alcohol use: Yes     Comment: occ             5/2/2023    10:06 AM   Alcohol Use    Prescreen: >3 drinks/day or >7 drinks/week? No     Do you have a current opioid prescription? No  Do you use any other controlled substances or medications that are not prescribed by a provider? None            Current providers sharing in care for this patient include:   Patient Care Team:  Sophia Mendez APRN CNP as PCP - General (Nurse Practitioner - Family)  Grady Armstrong MD as Referring Physician (Orthopedics)  Derick Calderon MD as MD (Orthopedics)  Sophia Mendez APRN CNP as Assigned PCP    The following health maintenance items are reviewed in Epic and correct as of today:  Health Maintenance   Topic Date Due     HF ACTION PLAN  Never done     MEDICARE ANNUAL WELLNESS VISIT  Never done     TSH W/FREE T4 REFLEX  03/17/2016     ZOSTER IMMUNIZATION (2 of 2) 04/27/2020     BMP  04/07/2023     ANNUAL REVIEW OF HM ORDERS  09/20/2023     ALT  10/07/2023     LIPID  10/07/2023     MICROALBUMIN  10/07/2023     FALL RISK ASSESSMENT  05/02/2024     CBC  05/02/2024     HEMOGLOBIN  05/02/2024     DTAP/TDAP/TD IMMUNIZATION (4 - Td or Tdap) 03/24/2025     ADVANCE CARE PLANNING  05/02/2028     PHQ-2 (once per calendar year)  Completed     INFLUENZA VACCINE  Completed     Pneumococcal Vaccine: 65+ Years  Completed     URINALYSIS  Completed     COVID-19 Vaccine  Completed     IPV IMMUNIZATION  Aged Out     MENINGITIS IMMUNIZATION  Aged Out     Medical History:   Amyloidosis (HCC)   Bacteremia due to Staphylococcus epidermidis 12/28/2019   Cancer (HCC)   skin   Cardiac valvulopathy   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, 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   Cerebral artery occlusion with cerebral infarction (HCC)   poss TIA   Coronary artery disease   Drug-eluting stents to LAD 2007 and 2008 Catheterization 2010: LAD 30%, patent stents, diagonal one 30%, circumflex 30%, RCA 55% Nuclear stress test March 2013: Sascha protocol 8 minutes 58 seconds; no ischemia; LVEF 54%   COVID-19 vaccine series completed 10/31/2022   3rd shot/booster of Hum    Endocarditis   Hyperlipidemia   Hypertension   Other secondary hypertension, unspecified   ATTR cardiac amyloidosis   Palpitations 11/25/2019   Paroxysmal atrial fibrillation (HCC) 11/25/2019   Peripheral neuropathy   Valvular heart disease   Echocardiography 2010: Mild Aortic stenosis, mild aortic regurgitation, mild mitral regurgitation     Lab work is in process  Labs reviewed in EPIC  BP Readings from Last 3 Encounters:   05/02/23 126/54   09/20/22 130/70   06/10/22 132/72    Wt Readings from Last 3 Encounters:   05/02/23 103.4 kg (228 lb)   09/20/22 101.4 kg (223 lb 9.6 oz)   06/10/22 102.5 kg (226 lb)                  Patient Active Problem List   Diagnosis     Hyperlipidemia LDL goal <100     Ptosis-episodic     Diplopia     Coronary arteriosclerosis in native artery     Anxiety     Wild-type transthyretin-related (ATTR) amyloidosis (H)     Age-related nuclear cataract of both eyes     Amyloidogenic transthyretin amyloidosis (H)     Aortic valve stenosis     Atrial flutter (H)     Congestive heart failure (H)     Hypertension     Hypertensive kidney disease, stage III (H)     Long term (current) use of anticoagulants     Pulmonary fibrosis (H)     Presence of prosthetic heart valve     Need for SBE (subacute bacterial endocarditis) prophylaxis     Acute bacterial endocarditis     Aortic aneurysm without rupture, unspecified portion of aorta (H)     Stable angina pectoris (H)     Genital warts     Past Surgical History:   Procedure Laterality Date     CARDIAC SURGERY  4/8/08      CARDIAC SURGERY  4/12/07     JOINT REPLACEMENT, HIP RT/LT Right 07/30/2018    Joint Replacement Hip RT/LT     ORTHOPEDIC SURGERY  10/08    LT hip replacment     TRANSCATHETER AORTIC VALVE REPLACEMENT- CAROTID APPROACH  10/07/2019    at [place]       Social History     Tobacco Use     Smoking status: Never     Smokeless tobacco: Never   Vaping Use     Vaping status: Never Used     Passive vaping exposure: Yes   Substance Use Topics      Alcohol use: Yes     Comment: occ     Family History   Problem Relation Age of Onset     Dementia Mother      Diabetes Father      Hypertension Father          Current Outpatient Medications   Medication Sig Dispense Refill     acetaminophen (TYLENOL) 500 MG tablet Take 1,000 mg by mouth 2 times daily        ALPRAZolam (XANAX) 0.25 MG tablet Take 1-2 tablets (0.25-0.5 mg) by mouth 3 times daily as needed for anxiety 15 tablet 0     atorvastatin (LIPITOR) 20 MG tablet Take 10 mg by mouth every evening        cephALEXin (KEFLEX) 500 MG capsule Take 500 mg by mouth       cyanocobalamin (VITAMIN B-12) 500 MCG tablet Take 500 mcg by mouth daily       escitalopram (LEXAPRO) 5 MG tablet Take 5 mg by mouth daily       Iron Combinations (IRON COMPLEX PO)        potassium chloride ER (K-TAB/KLOR-CON) 10 MEQ CR tablet Take 1 tablet by mouth       rivaroxaban ANTICOAGULANT (XARELTO) 20 MG TABS tablet Take 1 tablet (20 mg) by mouth daily (with dinner) 35 tablet 1     Tafamidis 61 MG CAPS Take 61 mg by mouth       torsemide (DEMADEX) 20 MG tablet 10 mg daily       Allergies   Allergen Reactions     Iodine Rash     THE IODINE HE WAS GIVEN  WAS IN THE FORM OF CONTRAST DYE AND HE DEVELOPED A RASH FROM IT     Recent Labs   Lab Test 10/07/22  1451 07/15/21  1932 08/07/18  1840 08/01/18  0000 07/13/18  1044 08/13/17  0016 03/17/15  0000   LDL 56  --   --   --   --   --  69   HDL 48  --   --   --   --   --   --    TRIG 181*  --   --   --   --   --   --    ALT 32  --   --   --   --  30 22   CR 1.21* 1.06 1.10 1.17*   < > 1.09 1.14   GFRESTIMATED 59* 65 64 60*   < > 65 >=60   GFRESTBLACK  --   --  78 >60   < > 79  --    POTASSIUM 3.7 4.0 4.2  --    < > 3.6 4.0   TSH  --   --   --   --   --   --  1.01    < > = values in this interval not displayed.        Review of Systems   Constitutional: Negative for chills and fever.   HENT: Positive for congestion and hearing loss. Negative for ear pain and sore throat.    Eyes: Negative for pain and  "visual disturbance.   Respiratory: Positive for cough and shortness of breath.    Cardiovascular: Negative for chest pain, palpitations and peripheral edema.   Gastrointestinal: Negative for abdominal pain, constipation, diarrhea, heartburn, hematochezia and nausea.   Genitourinary: Positive for frequency, impotence and urgency. Negative for dysuria, genital sores, hematuria and penile discharge.   Musculoskeletal: Positive for arthralgias. Negative for joint swelling and myalgias.   Skin: Positive for rash.   Neurological: Positive for dizziness. Negative for weakness, headaches and paresthesias.   Psychiatric/Behavioral: Negative for mood changes. The patient is nervous/anxious.      OBJECTIVE:   /54 (BP Location: Right arm, Patient Position: Sitting, Cuff Size: Adult Regular)   Pulse 81   Temp (!) 96.7  F (35.9  C) (Tympanic)   Resp 12   Ht 1.943 m (6' 4.5\")   Wt 103.4 kg (228 lb)   SpO2 98%   BMI 27.39 kg/m   Estimated body mass index is 27.39 kg/m  as calculated from the following:    Height as of this encounter: 1.943 m (6' 4.5\").    Weight as of this encounter: 103.4 kg (228 lb).     Physical Exam  GENERAL: healthy, alert and no distress  EYES: Eyes grossly normal to inspection, PERRL and conjunctivae and sclerae normal  HENT: ear canals and TM's normal, nose and mouth without ulcers or lesions  NECK: no adenopathy, no asymmetry, masses, or scars and thyroid normal to palpation  RESP: lungs clear to auscultation - no rales, rhonchi or wheezes  CV: regular rate and rhythm, normal S1 S2, no S3 or S4, no murmur, click or rub, no peripheral edema and peripheral pulses strong  ABDOMEN: soft, nontender, no hepatosplenomegaly, no masses and bowel sounds normal  MS: no gross musculoskeletal defects noted, no edema  SKIN: no suspicious lesions or rashes  NEURO: Normal strength and tone, mentation intact and speech normal  PSYCH: mentation appears normal, affect normal/bright    ASSESSMENT / PLAN:   1. " Encounter for Medicare annual wellness exam  Medicare wellness exam completed today.  Overall doing well.  He continues to follow with providers in [place] as well as [facility] [place] [facility].  He recently had work-up with his cardiologist and generally got a stable report.  He denies any chest pain, shortness of breath, or difficulty breathing.  - PRIMARY CARE FOLLOW-UP SCHEDULING; Future    2. Hypertensive kidney disease, stage III (H)  Blood pressure is well controlled today.  Cardiology has not prescribed an ACE/ARB or beta-blocker.  - UA Macroscopic with reflex to Microscopic and Culture; Future  - TSH WITH FREE T4 REFLEX; Future  - BASIC METABOLIC PANEL; Future    3. Chronic diastolic heart failure (H)  Recently had elevation of his BNP.  [facility] [place] [facility] cardiologist recommended increasing his torsemide for 3 days.  He has done so and has noticed improvement of his weight.  Otherwise has been stable.    4. Aortic aneurysm without rupture, unspecified portion of aorta (H)  Stable following with cardiology.    5. Paroxysmal atrial fibrillation (H)  Stable asymptomatic continue on rivaroxaban    6. Interstitial lung disease (H)  Stable overall doing well no shortness of breath.    7. Amyloidogenic transthyretin amyloidosis (H)  Stable doing well no additional concerns    8. Stable angina pectoris (H)  Stable doing well has not required use of nitro.    9. Elevated prostate specific antigen (PSA)  Previous PSA completed approximately 6 weeks ago was 11.8 he would like to recheck this today orders placed  - PSA, tumor marker; Future  - PSA, tumor marker    10. Iron deficiency anemia, unspecified iron deficiency anemia type  Has shown some evidence of iron deficiency anemia we will check iron studies today as recommended by [facility] [place] [facility] providers.  - CBC with platelets; Future  - Ferritin; Future  - Iron and iron binding capacity; Future  - Transferrin; Future    11. Hyperlipidemia LDL goal <100  Currently on atorvastatin 20 mg  -  "Lipid panel reflex to direct LDL Non-fasting; Future    12. Stage 3a chronic kidney disease (H)  Overall has been stable will recheck.  - UA Macroscopic with reflex to Microscopic and Culture; Future  - TSH WITH FREE T4 REFLEX; Future      Patient has been advised of split billing requirements and indicates understanding: Yes      COUNSELING:  Reviewed preventive health counseling, as reflected in patient instructions       Regular exercise       Healthy diet/nutrition       Prostate cancer screening      BMI:   Estimated body mass index is 27.39 kg/m  as calculated from the following:    Height as of this encounter: 1.943 m (6' 4.5\").    Weight as of this encounter: 103.4 kg (228 lb).         He reports that he has never smoked. He has never used smokeless tobacco.      Appropriate preventive services were discussed with this patient, including applicable screening as appropriate for cardiovascular disease, diabetes, osteopenia/osteoporosis, and glaucoma.  As appropriate for age/gender, discussed screening for colorectal cancer, prostate cancer, breast cancer, and cervical cancer. Checklist reviewing preventive services available has been given to the patient.    Reviewed patients plan of care and provided an AVS. The Basic Care Plan (routine screening as documented in Health Maintenance) for Jameel meets the Care Plan requirement. This Care Plan has been established and reviewed with the Patient.      BENY Davis CNP  [facility]    Identified Health Risks:    I have reviewed Opioid Use Disorder and Substance Use Disorder risk factors and made any needed referrals.       The patient was provided with suggestions to help him develop a healthy physical lifestyle.  The patient reports that he has difficulty with activities of daily living. I have asked that the patient make a follow up appointment in 12 weeks where this issue will be further evaluated and addressed.  The patient " was provided with written information regarding signs of hearing loss.  The patient was provided with suggestions to help him develop a healthy emotional lifestyle.

## 2023-05-03 LAB — PSA SERPL DL<=0.01 NG/ML-MCNC: 14.41 NG/ML

## 2023-06-07 ENCOUNTER — OFFICE VISIT (OUTPATIENT)
Dept: UROLOGY | Facility: CLINIC | Age: 83
End: 2023-06-07
Payer: MEDICARE

## 2023-06-07 VITALS
HEIGHT: 77 IN | DIASTOLIC BLOOD PRESSURE: 70 MMHG | TEMPERATURE: 97.3 F | SYSTOLIC BLOOD PRESSURE: 132 MMHG | HEART RATE: 72 BPM | BODY MASS INDEX: 27.39 KG/M2 | OXYGEN SATURATION: 98 %

## 2023-06-07 DIAGNOSIS — R97.20 ELEVATED PROSTATE SPECIFIC ANTIGEN (PSA): Primary | ICD-10-CM

## 2023-06-07 PROCEDURE — 99203 OFFICE O/P NEW LOW 30 MIN: CPT | Performed by: STUDENT IN AN ORGANIZED HEALTH CARE EDUCATION/TRAINING PROGRAM

## 2023-06-07 ASSESSMENT — PAIN SCALES - GENERAL: PAINLEVEL: NO PAIN (0)

## 2023-06-07 NOTE — PROGRESS NOTES
Chief Complaint:   Elevated PSA         History of Present Illness:   Jameel Barrett is a 83 year old male with a history of CKD stage 3, aortic aneurysm, atrial flutter, CHF, pulmonary fibrosis, HTN, and HLD who presents for evaluation of elevated PSA.    PSA history:  5/02/2023: 14.41  2/21/2023: 11.8  6/7/2012: 3.74    He has never had a prostate MRI or prostate biopsy. He has no family history of prostate cancer or other  malignancy.      He denies urinary symptoms including dysuria, urinary frequency and urgency, nocturia, double voiding, weak stream, needing to strain to void, hesitancy, intermittency, sensation of incomplete bladder emptying.         Past Medical History:     Past Medical History:   Diagnosis Date     Coronary artery disease      Hypertension      Neck pain 6/8/2012     Skin cancer             Past Surgical History:     Past Surgical History:   Procedure Laterality Date     CARDIAC SURGERY  4/8/08      CARDIAC SURGERY  4/12/07     JOINT REPLACEMENT, HIP RT/LT Right 07/30/2018    Joint Replacement Hip RT/LT     ORTHOPEDIC SURGERY  10/08    LT hip replacment     TRANSCATHETER AORTIC VALVE REPLACEMENT- CAROTID APPROACH  10/07/2019    at [place]            Medications     Current Outpatient Medications   Medication     acetaminophen (TYLENOL) 500 MG tablet     atorvastatin (LIPITOR) 20 MG tablet     cephALEXin (KEFLEX) 500 MG capsule     cyanocobalamin (VITAMIN B-12) 500 MCG tablet     escitalopram (LEXAPRO) 5 MG tablet     Iron Combinations (IRON COMPLEX PO)     potassium chloride ER (K-TAB/KLOR-CON) 10 MEQ CR tablet     rivaroxaban ANTICOAGULANT (XARELTO) 20 MG TABS tablet     Tafamidis 61 MG CAPS     torsemide (DEMADEX) 20 MG tablet     No current facility-administered medications for this visit.            Allergies:   Iodine         Review of Systems:  From intake questionnaire   Negative 14 system review except as noted on HPI, nurse's note.         Physical Exam:   Patient is a 83  "year old  male   Vitals: Blood pressure 132/70, pulse 72, temperature 97.3  F (36.3  C), temperature source Oral, height 1.943 m (6' 4.5\"), SpO2 98 %.  General Appearance Adult: Alert, no acute distress, oriented.  Lungs: Non-labored breathing.  Heart: No obvious jugular venous distension present.  Neuro: Alert, oriented, speech and mentation normal      Labs and Pathology:    I personally reviewed all applicable laboratory data and went over findings with patient  Significant for:    CBC RESULTS:  Recent Labs   Lab Test 05/02/23  1101 10/07/22  1451 07/15/21  1932 08/07/18  1840   WBC 5.4 4.3 5.2 9.0   HGB 13.6 13.4 13.9 10.7*    173 200 322        BMP RESULTS:  Recent Labs   Lab Test 05/02/23  1101 10/07/22  1451 07/15/21  1932 08/07/18  1840 08/01/18  0000 07/31/18  0000 07/13/18  1044 08/13/17  0016    143 142 141  --   --  140 140   POTASSIUM 4.2 3.7 4.0 4.2  --    < > 3.9 3.6   CHLORIDE 105 102 110* 107  --   --  106 107   CO2 28 28 24 28  --   --  29 27   ANIONGAP 12 13 8 6  --   --  5 6   * 116* 123* 92  --   --  121* 92   BUN 17.4 21.0 15 22  --   --  19 17   CR 1.19* 1.21* 1.06 1.10 1.17*  --  1.09 1.09   GFRESTIMATED 60* 59* 65 64 60*  --  65 65   GFRESTBLACK  --   --   --  78 >60  --  79 79   REBECCA 10.4* 9.3 9.1 9.3  --   --  9.8 9.2    < > = values in this interval not displayed.       UA RESULTS:   Recent Labs   Lab Test 05/02/23  1100   SG 1.015   URINEPH 6.0   NITRITE Negative       PSA RESULTS  PSA   Date Value Ref Range Status   06/07/2012 3.74 0 - 4 ug/L Final     PSA Tumor Marker   Date Value Ref Range Status   05/02/2023 14.41 ng/mL Final     Comment:     No reference ranges have been established for patients over 80 years.              Assessment and Plan:     Assessment: 83 year old male who presents for evaluation of elevated PSA. We discussed the possible causes of elevated PSA including prostate cancer, prostate enlargement, UTI, voiding symptoms, and ejaculation or " bike/motorcycle riding around the time of lab draw.     We discussed that we typically stop screening for prostate cancer in patients >75 or patients with a <10 year life expectancy. We discussed many options for further evaluation including no further evaluation, PSA monitoring, evaluation for metastatic disease with CT abdomen pelvis and NM bone scan, and prostate MRI. The patient elected to repeat his PSA in six months before considering other options.     Plan:  Repeat PSA in six months.     CHAZ ALVA PA-C  Department of Urology

## 2023-06-07 NOTE — NURSING NOTE
"Initial /70   Pulse 72   Temp 97.3  F (36.3  C) (Oral)   Ht 1.943 m (6' 4.5\")   SpO2 98%   BMI 27.39 kg/m   Estimated body mass index is 27.39 kg/m  as calculated from the following:    Height as of this encounter: 1.943 m (6' 4.5\").    Weight as of 5/2/23: 103.4 kg (228 lb). .    Kanwal Nogueira MA on 6/7/2023 at 11:03 AM          "

## 2023-09-06 NOTE — PROCEDURE: EXCISION
none Post-Care Instructions: POST-OPERATIVE WOUND CARE\\n\\n1. Leave bandage on for the first 2 days.  Do not get the bandage wet (i.e., submerged in a bath, pool, or sweating).  Please limit activities while your stitches are in.  Stitches can break.  Skin can tear.  If you feel tension on the stitches you must stop that motion/activity.\\n\\n2. A shower may be taken after the first 2 days. Please take sponge baths during the first two days after your surgery, unless instructed otherwise.\\n\\n3. Carefully remove the bandage so as not to irritate the wound. \\n\\n4. Gently cleanse the area with soap and water.\\n\\n5. Apply a thin layer of Vaseline or Bacitracin ointment to the wound.  If another antibiotic has been prescribed, please apply as instructed on the prescription packaging. \\n\\n8. Cut a piece of non-stick gauze or Telfa pad to a size just large enough to cover the surgical site.\\n\\n9. Apply medical tape or hypoallergenic paper tape (for those with allergy to bandage adhesive) to hold your bandage in place.\\n\\n10.  The surgical site should be cleansed and dressed as described above on a daily basis until the day of   \\n   suture removal.  \\n\\n11.  If shaving around the area, leave your bandage on and shave around it.  Shaving over the sutures could \\n allow the wound to open up or increase your infection risk.\\n\\nDO NOT SWIM OR GO IN A HOT TUB UNTIL SUTURES HAVE BEEN REMOVED.\\n \\nADDITIONAL INSTRUCTIONS:\\n\\n1. Bruising and swelling may occur after the surgery and can increase for up to 48 hours.  Do not be alarmed--this will resolve over time.  Applying ice compresses over the bandage will help to decrease swelling and bruising:   ICE: Wrap a clean hand-towel or handkerchief around your ice pack.\\n Apply over the bandage for 15 minutes on and off for 2 hours to help reduce swelling.\\n\\n2. If slight bleeding occurs, apply continuous pressure for 20 minutes.  If bleeding persists or is severe, call the office.  If the office is closed, call 911 or go to the nearest emergency room.\\n\\n3. If the wound becomes hot or extremely tender to touch or it is bright red around the wound, extending 1 inch beyond the wound edge, please contact us at [phone].  If after hours, call [phone].\\n\\n4. Some oozing from the wound is normal.\\n\\n5. If healing without sutures:  Signs of healing may not be apparent for 2-3 weeks and the wound may take 6-8 weeks or longer to completely heal.  Clean the area with soap and water daily. Apply Vaseline daily or prescription ointment as directed.\\n\\n\\n_____________I HAVE BEEN INSTRUCTED REGARDING THE ABOVE.\\n(Patient?s Initials)

## 2023-11-21 ENCOUNTER — APPOINTMENT (RX ONLY)
Dept: URBAN - METROPOLITAN AREA CLINIC 147 | Facility: CLINIC | Age: 83
Setting detail: DERMATOLOGY
End: 2023-11-21

## 2023-11-21 DIAGNOSIS — Z85.820 PERSONAL HISTORY OF MALIGNANT MELANOMA OF SKIN: ICD-10-CM

## 2023-11-21 DIAGNOSIS — L57.0 ACTINIC KERATOSIS: ICD-10-CM

## 2023-11-21 DIAGNOSIS — D22 MELANOCYTIC NEVI: ICD-10-CM

## 2023-11-21 DIAGNOSIS — L81.4 OTHER MELANIN HYPERPIGMENTATION: ICD-10-CM

## 2023-11-21 DIAGNOSIS — L82.1 OTHER SEBORRHEIC KERATOSIS: ICD-10-CM

## 2023-11-21 DIAGNOSIS — D18.0 HEMANGIOMA: ICD-10-CM

## 2023-11-21 PROBLEM — D48.5 NEOPLASM OF UNCERTAIN BEHAVIOR OF SKIN: Status: ACTIVE | Noted: 2023-11-21

## 2023-11-21 PROBLEM — D18.01 HEMANGIOMA OF SKIN AND SUBCUTANEOUS TISSUE: Status: ACTIVE | Noted: 2023-11-21

## 2023-11-21 PROBLEM — D22.5 MELANOCYTIC NEVI OF TRUNK: Status: ACTIVE | Noted: 2023-11-21

## 2023-11-21 PROCEDURE — 11103 TANGNTL BX SKIN EA SEP/ADDL: CPT

## 2023-11-21 PROCEDURE — ? FULL BODY SKIN EXAM

## 2023-11-21 PROCEDURE — ? BIOPSY BY SHAVE METHOD

## 2023-11-21 PROCEDURE — 17003 DESTRUCT PREMALG LES 2-14: CPT

## 2023-11-21 PROCEDURE — ? COUNSELING

## 2023-11-21 PROCEDURE — 99213 OFFICE O/P EST LOW 20 MIN: CPT | Mod: 25

## 2023-11-21 PROCEDURE — 11102 TANGNTL BX SKIN SINGLE LES: CPT

## 2023-11-21 PROCEDURE — ? TREATMENT REGIMEN

## 2023-11-21 PROCEDURE — ? LIQUID NITROGEN

## 2023-11-21 PROCEDURE — 17000 DESTRUCT PREMALG LESION: CPT | Mod: 59

## 2023-11-21 ASSESSMENT — LOCATION DETAILED DESCRIPTION DERM
LOCATION DETAILED: LEFT VENTRAL PROXIMAL FOREARM
LOCATION DETAILED: NASAL DORSUM
LOCATION DETAILED: INFERIOR THORACIC SPINE
LOCATION DETAILED: RIGHT SUPERIOR HELIX
LOCATION DETAILED: RIGHT VENTRAL PROXIMAL FOREARM
LOCATION DETAILED: RIGHT ANTERIOR SHOULDER
LOCATION DETAILED: LEFT NASAL ALA
LOCATION DETAILED: LEFT INFERIOR LATERAL FOREHEAD
LOCATION DETAILED: LEFT INFERIOR TEMPLE
LOCATION DETAILED: RIGHT MEDIAL UPPER BACK
LOCATION DETAILED: LEFT PROXIMAL DORSAL FOREARM
LOCATION DETAILED: PERIUMBILICAL SKIN
LOCATION DETAILED: RIGHT SUPERIOR LATERAL MALAR CHEEK
LOCATION DETAILED: RIGHT FOREHEAD
LOCATION DETAILED: RIGHT INFERIOR MEDIAL MIDBACK
LOCATION DETAILED: LEFT POSTERIOR SHOULDER

## 2023-11-21 ASSESSMENT — LOCATION SIMPLE DESCRIPTION DERM
LOCATION SIMPLE: ABDOMEN
LOCATION SIMPLE: RIGHT CHEEK
LOCATION SIMPLE: RIGHT FOREARM
LOCATION SIMPLE: LEFT FOREHEAD
LOCATION SIMPLE: LEFT NOSE
LOCATION SIMPLE: RIGHT SHOULDER
LOCATION SIMPLE: NOSE
LOCATION SIMPLE: LEFT SHOULDER
LOCATION SIMPLE: LEFT TEMPLE
LOCATION SIMPLE: RIGHT EAR
LOCATION SIMPLE: RIGHT UPPER BACK
LOCATION SIMPLE: RIGHT LOWER BACK
LOCATION SIMPLE: LEFT FOREARM
LOCATION SIMPLE: UPPER BACK
LOCATION SIMPLE: RIGHT FOREHEAD

## 2023-11-21 ASSESSMENT — LOCATION ZONE DERM
LOCATION ZONE: EAR
LOCATION ZONE: TRUNK
LOCATION ZONE: ARM
LOCATION ZONE: FACE
LOCATION ZONE: NOSE

## 2023-11-21 NOTE — PROCEDURE: LIQUID NITROGEN
Show Aperture Variable?: Yes
Post-Care Instructions: I reviewed with the patient in detail post-care instructions. Patient is to wear sunprotection, and avoid picking at any of the treated lesions. Pt may apply Vaseline to crusted or scabbing areas.
Detail Level: Zone
Duration Of Freeze Thaw-Cycle (Seconds): 0
Consent: The patient's consent was obtained including but not limited to risks of crusting, scabbing, blistering, scarring, darker or lighter pigmentary change, recurrence, incomplete removal and infection.
Render Post-Care Instructions In Note?: no

## 2023-12-14 ENCOUNTER — APPOINTMENT (RX ONLY)
Dept: URBAN - METROPOLITAN AREA CLINIC 147 | Facility: CLINIC | Age: 83
Setting detail: DERMATOLOGY
End: 2023-12-14

## 2023-12-14 DIAGNOSIS — L82.1 OTHER SEBORRHEIC KERATOSIS: ICD-10-CM

## 2023-12-14 DIAGNOSIS — L82.0 INFLAMED SEBORRHEIC KERATOSIS: ICD-10-CM

## 2023-12-14 DIAGNOSIS — L57.0 ACTINIC KERATOSIS: ICD-10-CM

## 2023-12-14 PROCEDURE — 17110 DESTRUCTION B9 LES UP TO 14: CPT

## 2023-12-14 PROCEDURE — ? PATHOLOGY DISCUSSION

## 2023-12-14 PROCEDURE — 99212 OFFICE O/P EST SF 10 MIN: CPT | Mod: 25

## 2023-12-14 PROCEDURE — ? LIQUID NITROGEN

## 2023-12-14 PROCEDURE — 17000 DESTRUCT PREMALG LESION: CPT | Mod: 59

## 2023-12-14 PROCEDURE — ? COUNSELING

## 2023-12-14 ASSESSMENT — LOCATION DETAILED DESCRIPTION DERM
LOCATION DETAILED: RIGHT INFERIOR MEDIAL MIDBACK
LOCATION DETAILED: LEFT POSTERIOR SHOULDER
LOCATION DETAILED: RIGHT NASAL SIDEWALL

## 2023-12-14 ASSESSMENT — LOCATION SIMPLE DESCRIPTION DERM
LOCATION SIMPLE: RIGHT LOWER BACK
LOCATION SIMPLE: RIGHT NOSE
LOCATION SIMPLE: LEFT SHOULDER

## 2023-12-14 ASSESSMENT — LOCATION ZONE DERM
LOCATION ZONE: NOSE
LOCATION ZONE: ARM
LOCATION ZONE: TRUNK

## 2023-12-28 ENCOUNTER — APPOINTMENT (RX ONLY)
Dept: URBAN - METROPOLITAN AREA CLINIC 147 | Facility: CLINIC | Age: 83
Setting detail: DERMATOLOGY
End: 2023-12-28

## 2023-12-28 DIAGNOSIS — L82.0 INFLAMED SEBORRHEIC KERATOSIS: ICD-10-CM

## 2023-12-28 PROCEDURE — ? COUNSELING

## 2023-12-28 PROCEDURE — ? PATHOLOGY DISCUSSION

## 2023-12-28 PROCEDURE — ? LIQUID NITROGEN

## 2023-12-28 PROCEDURE — 17110 DESTRUCTION B9 LES UP TO 14: CPT

## 2023-12-28 ASSESSMENT — LOCATION DETAILED DESCRIPTION DERM: LOCATION DETAILED: RIGHT INFERIOR MEDIAL MIDBACK

## 2023-12-28 ASSESSMENT — LOCATION SIMPLE DESCRIPTION DERM: LOCATION SIMPLE: RIGHT LOWER BACK

## 2023-12-28 ASSESSMENT — LOCATION ZONE DERM: LOCATION ZONE: TRUNK

## 2023-12-28 NOTE — PROCEDURE: LIQUID NITROGEN
Detail Level: Detailed
Show Applicator Variable?: Yes
Consent: The patient's consent was obtained including but not limited to risks of crusting, scabbing, blistering, scarring, darker or lighter pigmentary change, recurrence, incomplete removal and infection.
Add 52 Modifier (Optional): no
Medical Necessity Information: It is in your best interest to select a reason for this procedure from the list below. All of these items fulfill various CMS LCD requirements except the new and changing color options.
Medical Necessity Clause: This procedure was medically necessary because the lesions that were treated were:
Spray Paint Text: The liquid nitrogen was applied to the skin utilizing a spray paint frosting technique.
Post-Care Instructions: I reviewed with the patient in detail post-care instructions. Patient is to wear sunprotection, and avoid picking at any of the treated lesions. Pt may apply Vaseline to crusted or scabbing areas.

## 2024-05-06 ENCOUNTER — OFFICE VISIT (OUTPATIENT)
Dept: FAMILY MEDICINE | Facility: CLINIC | Age: 84
End: 2024-05-06
Payer: MEDICARE

## 2024-05-06 VITALS
HEART RATE: 66 BPM | DIASTOLIC BLOOD PRESSURE: 74 MMHG | OXYGEN SATURATION: 98 % | RESPIRATION RATE: 20 BRPM | BODY MASS INDEX: 26.33 KG/M2 | SYSTOLIC BLOOD PRESSURE: 132 MMHG | HEIGHT: 77 IN | TEMPERATURE: 98 F | WEIGHT: 223 LBS

## 2024-05-06 DIAGNOSIS — I12.9 HYPERTENSIVE KIDNEY DISEASE, STAGE III (H): ICD-10-CM

## 2024-05-06 DIAGNOSIS — I50.32 CHRONIC DIASTOLIC CONGESTIVE HEART FAILURE (H): ICD-10-CM

## 2024-05-06 DIAGNOSIS — F41.0 PANIC ATTACK: ICD-10-CM

## 2024-05-06 DIAGNOSIS — R26.89 BALANCE PROBLEMS: ICD-10-CM

## 2024-05-06 DIAGNOSIS — I25.10 CORONARY ARTERIOSCLEROSIS IN NATIVE ARTERY: ICD-10-CM

## 2024-05-06 DIAGNOSIS — N18.30 HYPERTENSIVE KIDNEY DISEASE, STAGE III (H): ICD-10-CM

## 2024-05-06 DIAGNOSIS — E78.5 HYPERLIPIDEMIA LDL GOAL <100: ICD-10-CM

## 2024-05-06 DIAGNOSIS — I82.511 CHRONIC DEEP VEIN THROMBOSIS (DVT) OF FEMORAL VEIN OF RIGHT LOWER EXTREMITY (H): ICD-10-CM

## 2024-05-06 DIAGNOSIS — Z00.00 ENCOUNTER FOR MEDICARE ANNUAL WELLNESS EXAM: Primary | ICD-10-CM

## 2024-05-06 PROCEDURE — 99214 OFFICE O/P EST MOD 30 MIN: CPT | Mod: 25 | Performed by: FAMILY MEDICINE

## 2024-05-06 PROCEDURE — G0439 PPPS, SUBSEQ VISIT: HCPCS | Performed by: FAMILY MEDICINE

## 2024-05-06 RX ORDER — ESCITALOPRAM OXALATE 5 MG/1
10 TABLET ORAL DAILY
Qty: 90 TABLET | Refills: 1 | Status: SHIPPED | OUTPATIENT
Start: 2024-05-06 | End: 2024-06-10

## 2024-05-06 RX ORDER — TORSEMIDE 20 MG/1
20 TABLET ORAL DAILY
Qty: 90 TABLET | Refills: 1 | Status: SHIPPED | OUTPATIENT
Start: 2024-05-06

## 2024-05-06 RX ORDER — RESPIRATORY SYNCYTIAL VIRUS VACCINE 120MCG/0.5
0.5 KIT INTRAMUSCULAR ONCE
Qty: 1 EACH | Refills: 0 | Status: CANCELLED | OUTPATIENT
Start: 2024-05-06 | End: 2024-05-06

## 2024-05-06 SDOH — HEALTH STABILITY: PHYSICAL HEALTH: ON AVERAGE, HOW MANY DAYS PER WEEK DO YOU ENGAGE IN MODERATE TO STRENUOUS EXERCISE (LIKE A BRISK WALK)?: 7 DAYS

## 2024-05-06 ASSESSMENT — PAIN SCALES - GENERAL: PAINLEVEL: MODERATE PAIN (4)

## 2024-05-06 ASSESSMENT — SOCIAL DETERMINANTS OF HEALTH (SDOH): HOW OFTEN DO YOU GET TOGETHER WITH FRIENDS OR RELATIVES?: ONCE A WEEK

## 2024-05-06 NOTE — PROGRESS NOTES
"Preventive Care Visit  [facility]  Azeb Street MD, Family Medicine  May 6, 2024      Assessment & Plan     Encounter for Medicare annual wellness exam       Hyperlipidemia LDL goal <100  Patient believes he is on atorvastatin, but doesn't have that bottle with him today, it was not refilled  Lipids done in December 2023    Hypertensive kidney disease, stage III (H)  Kidney function stable    Coronary arteriosclerosis in native artery  Sees cardiology once a year at [place]    Chronic diastolic congestive heart failure (H)  Fluid status is stable  Refilled diuretic  Labs current as of December  - torsemide (DEMADEX) 20 MG tablet; Take 1 tablet (20 mg) by mouth daily    Chronic deep vein thrombosis (DVT) of femoral vein of right lower extremity (H)  Is on xarelto   Old clot  - rivaroxaban ANTICOAGULANT (XARELTO ANTICOAGULANT) 20 MG TABS tablet; Take 1 tablet (20 mg) by mouth daily (with dinner)    Balance problems  No falls but feeling more unsteady on his feet  - Physical Therapy  Referral; Future    Panic attack  Feels moods are quite stable at this dose    - escitalopram (LEXAPRO) 5 MG tablet; Take 2 tablets (10 mg) by mouth daily    Patient has been advised of split billing requirements and indicates understanding: Yes          BMI  Estimated body mass index is 26.79 kg/m  as calculated from the following:    Height as of this encounter: 1.943 m (6' 4.5\").    Weight as of this encounter: 101.2 kg (223 lb).       Counseling  Appropriate preventive services were discussed with this patient, including applicable screening as appropriate for fall prevention, nutrition, physical activity, Tobacco-use cessation, weight loss and cognition.  Checklist reviewing preventive services available has been given to the patient.  Reviewed patient's diet, addressing concerns and/or questions.   The patient was instructed to see the dentist every 6 months.   He is at risk for psychosocial " distress and has been provided with information to reduce risk.           Jose Coronel is a 84 year old, presenting for the following:  Medicare Visit        5/6/2024     9:52 AM   Additional Questions   Roomed by Cathy velázquez CMA   Accompanied by Self         Health Care Directive  Patient does not have a Health Care Directive or Living Will: Discussed advance care planning with patient; however, patient declined at this time.    HPI          5/6/2024   General Health   How would you rate your overall physical health? Good   Feel stress (tense, anxious, or unable to sleep) Only a little   (!) STRESS CONCERN      5/6/2024   Nutrition   Diet: Regular (no restrictions)         5/6/2024   Exercise   Days per week of moderate/strenous exercise 7 days         5/6/2024   Social Factors   Frequency of gathering with friends or relatives Once a week   Worry food won't last until get money to buy more No   Food not last or not have enough money for food? No   Do you have housing?  Yes   Are you worried about losing your housing? No   Lack of transportation? No   Unable to get utilities (heat,electricity)? No         5/6/2024   Fall Risk   Fallen 2 or more times in the past year? No   Trouble with walking or balance? Yes   Gait Speed Test (Document in seconds) 6   Gait Speed Test Interpretation Greater than 5.01 seconds - ABNORMAL          5/6/2024   Activities of Daily Living- Home Safety   Needs help with the following daily activites None of the above   Safety concerns in the home None of the above         5/6/2024   Dental   Dentist two times every year? (!) NO         5/6/2024   Hearing Screening   Hearing concerns? None of the above         5/6/2024   Driving Risk Screening   Patient/family members have concerns about driving No         5/6/2024   General Alertness/Fatigue Screening   Have you been more tired than usual lately? No         5/6/2024   Urinary Incontinence Screening   Bothered by leaking urine in past 6  months No         5/6/2024   TB Screening   Were you born outside of the [place]? No         Today's PHQ-2 Score:       5/6/2024     9:57 AM   PHQ-2 ( 1999 [employer])   Q1: Little interest or pleasure in doing things 0   Q2: Feeling down, depressed or hopeless 0   PHQ-2 Score 0   Q1: Little interest or pleasure in doing things Not at all   Q2: Feeling down, depressed or hopeless Not at all   PHQ-2 Score 0           5/6/2024   Substance Use   Alcohol more than 3/day or more than 7/wk No   Do you have a current opioid prescription? No   How severe/bad is pain from 1 to 10? 4/10   Do you use any other substances recreationally? No     Social History     Tobacco Use    Smoking status: Never    Smokeless tobacco: Never   Vaping Use    Vaping status: Never Used   Substance Use Topics    Alcohol use: Yes     Comment: occ    Drug use: Never             Reviewed and updated as needed this visit by Provider                      Current providers sharing in care for this patient include:  Patient Care Team:  Sophia Mendez APRN CNP as PCP - General (Nurse Practitioner - Family)  Grady Armstrong MD as Referring Physician (Orthopedics)  Derick Calderon MD as MD (Orthopedics)  Sophia Mendez APRN CNP as Assigned PCP  Bhavya Rice PA-C as Assigned Surgical Provider    The following health maintenance items are reviewed in Epic and correct as of today:  Health Maintenance   Topic Date Due    HF ACTION PLAN  Never done    RSV VACCINE (Pregnancy & 60+) (1 - 1-dose 60+ series) Never done    ZOSTER IMMUNIZATION (2 of 2) 04/27/2020    ANNUAL REVIEW OF HM ORDERS  09/20/2023    ALT  10/07/2023    MICROALBUMIN  10/07/2023    BMP  11/02/2023    COVID-19 Vaccine (7 - 2023-24 season) 02/02/2024    LIPID  05/02/2024    CBC  05/02/2024    HEMOGLOBIN  05/02/2024    INFLUENZA VACCINE (Season Ended) 09/01/2024    DTAP/TDAP/TD IMMUNIZATION (4 - Td or Tdap) 03/24/2025    MEDICARE ANNUAL WELLNESS VISIT  05/06/2025    FALL RISK ASSESSMENT   "05/06/2025    ADVANCE CARE PLANNING  05/06/2029    TSH W/FREE T4 REFLEX  Completed    PHQ-2 (once per calendar year)  Completed    Pneumococcal Vaccine: 65+ Years  Completed    URINALYSIS  Completed    IPV IMMUNIZATION  Aged Out    HPV IMMUNIZATION  Aged Out    MENINGITIS IMMUNIZATION  Aged Out    RSV MONOCLONAL ANTIBODY  Aged Out         Review of Systems  Constitutional, HEENT, cardiovascular, pulmonary, gi and gu systems are negative, except as otherwise noted.     Objective    Exam  /74   Pulse 66   Temp 98  F (36.7  C) (Tympanic)   Resp 20   Ht 1.943 m (6' 4.5\")   Wt 101.2 kg (223 lb)   SpO2 98%   BMI 26.79 kg/m     Estimated body mass index is 26.79 kg/m  as calculated from the following:    Height as of this encounter: 1.943 m (6' 4.5\").    Weight as of this encounter: 101.2 kg (223 lb).    Physical Exam  GENERAL: alert and no distress  NECK: no adenopathy, no asymmetry, masses, or scars  RESP: lungs clear to auscultation - no rales, rhonchi or wheezes  CV: regular rates and rhythm, normal S1 S2, no S3 or S4, grade 3/6 systolic murmur heard best over the rusb, peripheral pulses strong, and no peripheral edema  ABDOMEN: soft, nontender, no hepatosplenomegaly, no masses and bowel sounds normal  MS: no gross musculoskeletal defects noted, no edema         5/6/2024   Mini Cog   Clock Draw Score 2 Normal   3 Item Recall 2 objects recalled   Mini Cog Total Score 4              Signed Electronically by: Azeb Street MD    "

## 2024-05-06 NOTE — PATIENT INSTRUCTIONS
Preventive Care Advice   This is general advice given by our system to help you stay healthy. However, your care team may have specific advice just for you. Please talk to your care team about your preventive care needs.  Nutrition  Eat 5 or more servings of fruits and vegetables each day.  Try wheat bread, brown rice and whole grain pasta (instead of white bread, rice, and pasta).  Get enough calcium and vitamin D. Check the label on foods and aim for 100% of the RDA (recommended daily allowance).  Lifestyle  Exercise at least 150 minutes each week   (30 minutes a day, 5 days a week).  Do muscle strengthening activities 2 days a week. These help control your weight and prevent disease.  No smoking.  Wear sunscreen to prevent skin cancer.  Have a dental exam and cleaning every 6 months.  Yearly exams  See your health care team every year to talk about:  Any changes in your health.  Any medicines your care team has prescribed.  Preventive care, family planning, and ways to prevent chronic diseases.  Shots (vaccines)   HPV shots (up to age 26), if you've never had them before.  Hepatitis B shots (up to age 59), if you've never had them before.  COVID-19 shot: Get this shot when it's due.  Flu shot: Get a flu shot every year.  Tetanus shot: Get a tetanus shot every 10 years.  Pneumococcal, hepatitis A, and RSV shots: Ask your care team if you need these based on your risk.  Shingles shot (for age 50 and up).  General health tests  Diabetes screening:  Starting at age 35, Get screened for diabetes at least every 3 years.  If you are younger than age 35, ask your care team if you should be screened for diabetes.  Cholesterol test: At age 39, start having a cholesterol test every 5 years, or more often if advised.  Bone density scan (DEXA): At age 50, ask your care team if you should have this scan for osteoporosis (brittle bones).  Hepatitis C: Get tested at least once in your life.  STIs (sexually transmitted  infections)  Before age 24: Ask your care team if you should be screened for STIs.  After age 24: Get screened for STIs if you're at risk. You are at risk for STIs (including HIV) if:  You are sexually active with more than one person.  You don't use condoms every time.  You or a partner was diagnosed with a sexually transmitted infection.  If you are at risk for HIV, ask about PrEP medicine to prevent HIV.  Get tested for HIV at least once in your life, whether you are at risk for HIV or not.  Cancer screening tests  Cervical cancer screening: If you have a cervix, begin getting regular cervical cancer screening tests at age 21. Most people who have regular screenings with normal results can stop after age 65. Talk about this with your provider.  Breast cancer scan (mammogram): If you've ever had breasts, begin having regular mammograms starting at age 40. This is a scan to check for breast cancer.  Colon cancer screening: It is important to start screening for colon cancer at age 45.  Have a colonoscopy test every 10 years (or more often if you're at risk) Or, ask your provider about stool tests like a FIT test every year or Cologuard test every 3 years.  To learn more about your testing options, visit: https://www.PublishThis/630164.pdf.  For help making a decision, visit: https://bit.ly/rz79319.  Prostate cancer screening test: If you have a prostate and are age 55 to 69, ask your provider if you would benefit from a yearly prostate cancer screening test.  Lung cancer screening: If you are a current or former smoker age 50 to 80, ask your care team if ongoing lung cancer screenings are right for you.  For informational purposes only. Not to replace the advice of your health care provider. Copyright   2023 [employer]. All rights reserved. Clinically reviewed by the [facility] Transitions Program. [employer] [id number] - REV 01/24.

## 2024-05-14 ENCOUNTER — THERAPY VISIT (OUTPATIENT)
Dept: PHYSICAL THERAPY | Facility: CLINIC | Age: 84
End: 2024-05-14
Attending: FAMILY MEDICINE
Payer: MEDICARE

## 2024-05-14 DIAGNOSIS — R26.89 BALANCE PROBLEM: Primary | ICD-10-CM

## 2024-05-14 DIAGNOSIS — R26.89 BALANCE PROBLEMS: ICD-10-CM

## 2024-05-14 PROCEDURE — 97110 THERAPEUTIC EXERCISES: CPT | Mod: GP | Performed by: PHYSICAL THERAPIST

## 2024-05-14 PROCEDURE — 97161 PT EVAL LOW COMPLEX 20 MIN: CPT | Mod: GP | Performed by: PHYSICAL THERAPIST

## 2024-05-14 NOTE — PROGRESS NOTES
PHYSICAL THERAPY EVALUATION  Type of Visit: Evaluation    See electronic medical record for Abuse and Falls Screening details.    Subjective       Presenting condition or subjective complaint:   Pt reports having a 3 prong issue. 1) When he takes torsomite that makes him dizzy for about 2 hours and sleepy. Has discussed this with his doctors but there's no alternative for that medication. 2). When he's tired, loses balance more easily. Rests a lot when he's working around the house, every few hours. Building deck, fixing a door, putting the dock in, etc. Has assist of wife. Gets muscle and tendon pulls quite a bit in multiple areas (shoulders, hips, hands, etc). 3). Difficulty adjusting to glasses, makes him more dizzy with sitting to stand. Vision is foggy but manageable. Variable lenses in the past. Sit to stand dizziness. No known issues rolling over in bed. Other factors: more supportive shoes feel more secure. RLE buckling, more often when he's tired. 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 Is going to [place] to discuss that 6/11/24. Has come close to falling a lot - falls to the left, rolls to outer left foot. Feels like his height is a factor, he has to look down to a lot of things. Can't get up off of the floor without support. *Dizziness, unsteadiness, walking when tired  Date of onset: Onset of difficulty 2023, recent referral 05/08/24    Relevant medical history:   PMH: dizziness, hearing problems, implanted device, numbness/tingling perianal area, L SO, CHF, HTN  Dates & types of surgery:      Prior diagnostic imaging/testing results:       Prior therapy history for the same diagnosis, illness or injury:        Prior Level of Function  Transfers: Independent  Ambulation: Independent  ADL: Independent  IADL: Driving, Housekeeping, Yard work    Living Environment  Social support:   significant other or spouse  Type of home:   1 level house  Stairs to enter the home:        yes  Ramp:   no  Stairs inside the home:        yes, 15-18  Help at home:   spouse at home  Equipment owned:   none    Employment:     retired  Hobbies/Interests:   yard work, building house repaired    Patient goals for therapy:   able to move from sit to stand without assist, improve standing balance      Objective      Cognitive Status Examination  Orientation: Oriented to person, place and time   Level of Consciousness: Alert  Follows Commands and Answers Questions: 100% of the time  Personal Safety and Judgement: Intact  Memory: Intact    OBSERVATION: fatigues quickly with tasks.   RANGE OF MOTION:  Hip/Ankle WNL, equal B. Limited right knee flexion    STRENGTH:  Seated ER 3/5; 4/5 throughout R/L hip, Abd SL 5/5      TRANSFERS: WNL    GAIT:   Level of [place]: Independent  Assistive Device(s): None  Gait Deviations: Stride length decreased    SPECIAL TESTS                  Nice Balance Scale (BBS)  defer   5 Times Sit-to-Stand (5TSTS)  27     Dynamic Gait Index (DGI)  NT   Timed Up and Go (TUG) - sec 13   Single Leg Stance Right (sec) NT   Single Leg Stance Left (sec) NT       Romberg  (sec) 30 seconds, SBA                     SENSATION: defer, may benefit from foot assessment 2/2 neuropathy report    Assessment & Plan   CLINICAL IMPRESSIONS  Medical Diagnosis: Balance problems (R26.89)    Treatment Diagnosis: Balance problems   Impression/Assessment: Patient is a 84 year old male with balance and dizziness complaints.  The following significant findings have been identified: Decreased strength, Impaired balance, Decreased proprioception, Impaired sensation, Impaired muscle performance, Decreased activity tolerance, and Dizziness. These impairments interfere with their ability to perform self care tasks, household chores, household mobility, and community mobility as compared to previous level of function.     Clinical Decision Making (Complexity):  Clinical Presentation: Stable/Uncomplicated  Clinical Presentation Rationale: based on medical and  personal factors listed in PT evaluation  Clinical Decision Making (Complexity): Low complexity    PLAN OF CARE  Treatment Interventions:  Interventions: Gait Training, Manual Therapy, Neuromuscular Re-education, Therapeutic Activity, Therapeutic Exercise, Self-Care/Home Management    Long Term Goals     PT Goal 1  Goal Identifier: 1  Goal Description: pt will improve his 5x sit to stand time to 24 seconds in 4 weeks  Target Date: 06/11/24  PT Goal 2  Goal Identifier: 2  Goal Description: pt will improve his TUG time to <11 seconds in 4 weeks  Target Date: 06/11/24  PT Goal 3  Goal Identifier: 3  Goal Description: Pt will report reduced frequency of 'near falls' in 8 weeks  Target Date: 07/09/24  PT Goal 4  Goal Identifier: 4  Goal Description: Pt will demonstrate HEP independently to facilitate self management of condition  Target Date: 07/09/24      Frequency of Treatment: 1x/week  Duration of Treatment: 8 weeks    Recommended Referrals to Other Professionals:  None  Education Assessment:   Learner/Method: Patient;Listening;Reading;Demonstration;Pictures/Video;No Barriers to Learning    Risks and benefits of evaluation/treatment have been explained.   Patient/Family/caregiver agrees with Plan of Care.     Evaluation Time:     PT Eval, Low Complexity Minutes (24397): 39     Signing Clinician: Fabienne Lovett, PT      [facility]                                                                                   OUTPATIENT PHYSICAL THERAPY      PLAN OF TREATMENT FOR OUTPATIENT REHABILITATION   Patient's Last Name, First Name, Jameel Dia YOB: 1940   Provider's Name   [facility]   Medical Record No.  [id number]     Onset Date: 05/08/24  Start of Care Date: 05/14/24     Medical Diagnosis:  Balance problems (R26.89)      PT Treatment Diagnosis:  Balance problems Plan of Treatment  Frequency/Duration: 1x/week/ 8 weeks    Certification  date from 05/14/24 to 07/09/24         See note for plan of treatment details and functional goals     Fabienne Lovett, PT                         I CERTIFY THE NEED FOR THESE SERVICES FURNISHED UNDER        THIS PLAN OF TREATMENT AND WHILE UNDER MY CARE .             Physician Signature               Date    X_____________________________________________________                  Referring Provider:  Azeb Street    Initial Assessment  See Epic Evaluation- Start of Care Date: 05/14/24

## 2024-05-21 ENCOUNTER — THERAPY VISIT (OUTPATIENT)
Dept: PHYSICAL THERAPY | Facility: CLINIC | Age: 84
End: 2024-05-21
Attending: FAMILY MEDICINE
Payer: MEDICARE

## 2024-05-21 DIAGNOSIS — R26.89 BALANCE PROBLEM: Primary | ICD-10-CM

## 2024-05-21 PROCEDURE — 97110 THERAPEUTIC EXERCISES: CPT | Mod: GP | Performed by: PHYSICAL THERAPIST

## 2024-05-28 ENCOUNTER — THERAPY VISIT (OUTPATIENT)
Dept: PHYSICAL THERAPY | Facility: CLINIC | Age: 84
End: 2024-05-28
Attending: FAMILY MEDICINE
Payer: MEDICARE

## 2024-05-28 DIAGNOSIS — R26.89 BALANCE PROBLEM: Primary | ICD-10-CM

## 2024-05-28 PROCEDURE — 97110 THERAPEUTIC EXERCISES: CPT | Mod: GP | Performed by: PHYSICAL THERAPIST

## 2024-05-28 PROCEDURE — 97530 THERAPEUTIC ACTIVITIES: CPT | Mod: GP | Performed by: PHYSICAL THERAPIST

## 2024-06-07 ENCOUNTER — THERAPY VISIT (OUTPATIENT)
Dept: PHYSICAL THERAPY | Facility: CLINIC | Age: 84
End: 2024-06-07
Attending: FAMILY MEDICINE
Payer: MEDICARE

## 2024-06-07 DIAGNOSIS — R26.89 BALANCE PROBLEM: Primary | ICD-10-CM

## 2024-06-07 PROCEDURE — 97110 THERAPEUTIC EXERCISES: CPT | Mod: GP | Performed by: PHYSICAL THERAPIST

## 2024-06-07 PROCEDURE — 97530 THERAPEUTIC ACTIVITIES: CPT | Mod: GP | Performed by: PHYSICAL THERAPIST

## 2024-06-10 ENCOUNTER — TELEPHONE (OUTPATIENT)
Dept: FAMILY MEDICINE | Facility: CLINIC | Age: 84
End: 2024-06-10

## 2024-06-10 ENCOUNTER — ALLIED HEALTH/NURSE VISIT (OUTPATIENT)
Dept: FAMILY MEDICINE | Facility: CLINIC | Age: 84
End: 2024-06-10
Payer: MEDICARE

## 2024-06-10 DIAGNOSIS — E78.5 HYPERLIPIDEMIA LDL GOAL <100: Primary | ICD-10-CM

## 2024-06-10 DIAGNOSIS — I82.511 CHRONIC DEEP VEIN THROMBOSIS (DVT) OF FEMORAL VEIN OF RIGHT LOWER EXTREMITY (H): Primary | ICD-10-CM

## 2024-06-10 DIAGNOSIS — I82.511 CHRONIC DEEP VEIN THROMBOSIS (DVT) OF FEMORAL VEIN OF RIGHT LOWER EXTREMITY (H): ICD-10-CM

## 2024-06-10 DIAGNOSIS — F41.0 PANIC ATTACK: ICD-10-CM

## 2024-06-10 PROCEDURE — 99207 PR NO CHARGE NURSE ONLY: CPT

## 2024-06-10 RX ORDER — ESCITALOPRAM OXALATE 5 MG/1
10 TABLET ORAL DAILY
Qty: 90 TABLET | Refills: 1 | Status: SHIPPED | OUTPATIENT
Start: 2024-06-10

## 2024-06-10 RX ORDER — CEPHALEXIN 500 MG/1
500 CAPSULE ORAL 2 TIMES DAILY
Status: CANCELLED | OUTPATIENT
Start: 2024-06-10

## 2024-06-10 RX ORDER — ATORVASTATIN CALCIUM 20 MG/1
20 TABLET, FILM COATED ORAL DAILY
Qty: 90 TABLET | Refills: 1 | Status: SHIPPED | OUTPATIENT
Start: 2024-06-10

## 2024-06-10 NOTE — TELEPHONE ENCOUNTER
Called and left message with pt and his wife they will contact cardiology  Crystal Barajas RN on 6/10/2024 at 1:17 PM

## 2024-06-10 NOTE — TELEPHONE ENCOUNTER
Pt walked to for RN visit he has been out of xarelto for 10 days and [facility] [place], signed RX resent to [facility] pharmacy,  lexapro also sent.      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  Crystal Barajas RN on 6/10/2024 at 11:15 AM

## 2024-06-10 NOTE — TELEPHONE ENCOUNTER
I didn't ask about those he still has some left but only sees [facility] provider in [place]. He said he couldn't go there and that is why he was out of the xarelto. They wanted to see him for refills.  Crystal Barajas RN on 6/10/2024 at 11:54 AM

## 2024-06-10 NOTE — TELEPHONE ENCOUNTER
The atorvastatin I can refill as I can see his lipids from [place].     The Cephalexin needs to be prescribed by his cardiologist or whoever is following him for this problem.    Azeb Street M.D.

## 2024-06-10 NOTE — TELEPHONE ENCOUNTER
Is the atorvastatin and cephalexin also just for bridging until he gets medications from the [facility]?    Azeb Street M.D.

## 2024-06-10 NOTE — TELEPHONE ENCOUNTER
Homer Gonzalez Dr. was in the pharmacy looking to get a 30 ds bridge fill  for his xarelto between now and when he can [facility] [place] [facility] [place]. Are you able to send us a prescription for a  30 day supply?     Thank you,  Khloe Matson, T  [facility] [place]   [phone]  Fax [phone]

## 2024-06-21 ENCOUNTER — THERAPY VISIT (OUTPATIENT)
Dept: PHYSICAL THERAPY | Facility: CLINIC | Age: 84
End: 2024-06-21
Attending: FAMILY MEDICINE
Payer: MEDICARE

## 2024-06-21 DIAGNOSIS — R26.89 BALANCE PROBLEM: Primary | ICD-10-CM

## 2024-06-21 PROCEDURE — 97110 THERAPEUTIC EXERCISES: CPT | Mod: GP | Performed by: PHYSICAL THERAPIST

## 2024-06-21 PROCEDURE — 97530 THERAPEUTIC ACTIVITIES: CPT | Mod: GP | Performed by: PHYSICAL THERAPIST

## 2024-06-28 ENCOUNTER — THERAPY VISIT (OUTPATIENT)
Dept: PHYSICAL THERAPY | Facility: CLINIC | Age: 84
End: 2024-06-28
Attending: FAMILY MEDICINE
Payer: MEDICARE

## 2024-06-28 DIAGNOSIS — R26.89 BALANCE PROBLEM: Primary | ICD-10-CM

## 2024-06-28 PROCEDURE — 97530 THERAPEUTIC ACTIVITIES: CPT | Mod: GP | Performed by: PHYSICAL THERAPIST

## 2024-06-28 PROCEDURE — 97110 THERAPEUTIC EXERCISES: CPT | Mod: GP | Performed by: PHYSICAL THERAPIST

## 2024-06-28 NOTE — PROGRESS NOTES
Physical Therapy Discharge Summary  06/28/24 7860   Appointment Info   Signing clinician's name / credentials Hodan Up Alysia PT DPT   Total/Authorized Visits 365  medicare soc 5/14/24   Visits Used 6 soc 5/14/24   Medical Diagnosis Balance problems (R26.89)   PT Tx Diagnosis Balance problems   Quick Adds Certification   Progress Note/Certification   Start of Care Date 05/14/24   Onset of illness/injury or Date of Surgery 05/08/24   Therapy Frequency 1x/week   Predicted Duration 8 weeks   Certification date from 05/14/24   Certification date to 07/09/24   Progress Note Due Date 07/09/24   GOALS   PT Goals 2;3;4   PT Goal 1   Goal Identifier 1   Goal Description pt will improve his 5x sit to stand time to 24 seconds in 4 weeks   Rationale to maximize safety and independence with performance of ADLs and functional tasks   Goal Progress 15.05 sec   Target Date 06/11/24   Date Met 06/28/24   PT Goal 2   Goal Identifier 2   Goal Description pt will improve his TUG time to <11 seconds in 4 weeks   Rationale to maximize safety and independence with performance of ADLs and functional tasks   Goal Progress 8.76 sec   Target Date 06/11/24   Date Met 06/28/24   PT Goal 3   Goal Identifier 3   Goal Description Pt will report reduced frequency of 'near falls' in 8 weeks   Rationale to maximize safety and independence with performance of ADLs and functional tasks   Goal Progress near falls have been result of dizzy and fatigue   Target Date 07/09/24   Date Met 06/28/24   PT Goal 4   Goal Description get hazelnut creamer at store without feeling like will fall ( head down )   Rationale to maximize safety and independence with performance of ADLs and functional tasks   Goal Progress still a struggle   Target Date 08/02/24   Subjective Report   Subjective Report dizziness is unpredictable, typically with sit to stand, when head is down (looking in low cupboard) and when fatigued; is working the program at home   Objective  Measures   Objective Measures Objective Measure 1;Objective Measure 2;Objective Measure 3   Objective Measure 1   Objective Measure 5x sit to stand   Details 15.05 se c   Objective Measure 2   Objective Measure TUG   Details 8.76 sec   Objective Measure 3   Objective Measure tandem stance   Details 30 sec EO   Therapeutic Procedure/Exercise   Therapeutic Procedures: strength, endurance, ROM, flexibility minutes (44602) 40   Ther Proc 1 - Details NuStep x6' wkld 6+6 more min wkld@5;   PTRx Ther Proc 1 Tandem Stance   PTRx Ther Proc 1 - Details 30 sec; progress to include head turns and EC   PTRx Ther Proc 2 Calf Raise - Single Leg   PTRx Ther Proc 2 - Details x 10   PTRx Ther Proc 3 Standing Gastroc Stretch   PTRx Ther Proc 3 - Details 30 sec   PTRx Ther Proc 4 Sit to Stand   PTRx Ther Proc 4 - Details 15 reps encouraged slo lower and focus on up as high as possible able to do single leg   PTRx Ther Proc 5 Lateral Step Down   PTRx Ther Proc 5 - Details 2x30 sec, good quality   PTRx Ther Proc 6 Functional Lunge Forward   PTRx Ther Proc 6 - Details x 8 reps   PTRx Ther Proc 7 Standing Quadriceps Stretch using Chair   PTRx Ther Proc 7 - Details 30 sec x2 B   PTRx Ther Proc 9 Functional Lunge Forward   PTRx Ther Proc 9 - Details 2x10   Skilled Intervention exercise instruction tempo,dosage, with manual, verbal, tactile cues for proper technique to decrease pain and improve functional mobility   Patient Response/Progress improved quality step down; improved heel raise single leg   Therapeutic Activity   Therapeutic Activities: dynamic activities to improve functional performance minutes (32351) 15   Ther Act 1 - Details tandem stance x 30 sec; 5x sit to stand; TUG   Skilled Intervention high level balance and coordination activities to improve proprioception, balance and functional strength   Patient Response/Progress balance improved since initial visit   Education   Learner/Method  Patient;Listening;Reading;Demonstration;Pictures/Video   Education Comments PTRx [id number]   Plan   Home program continue previous; progressed heel raise and tandem   Updates to plan of care Pt independent in HEP; goals met wishes to DC; will continue HEP   Total Session Time   Timed Code Treatment Minutes 55   Total Treatment Time (sum of timed and untimed services) 55         DISCHARGE  Reason for Discharge: Patient has met all goals.    Equipment Issued: none    Discharge Plan: Patient to continue home program.    Referring Provider:  Azeb Street

## 2024-07-24 ENCOUNTER — TRANSFERRED RECORDS (OUTPATIENT)
Dept: HEALTH INFORMATION MANAGEMENT | Facility: CLINIC | Age: 84
End: 2024-07-24

## 2024-07-24 LAB
ALT SERPL-CCNC: 23 U/L (ref 13–61)
AST SERPL-CCNC: 34 U/L (ref 15–37)
CREATININE (EXTERNAL): 1.1 MG/DL (ref 0.7–1.2)
GFR ESTIMATED (EXTERNAL): 65 ML/MIN/1.73M2
GLUCOSE (EXTERNAL): 95 MG/DL (ref 74–106)
POTASSIUM (EXTERNAL): 4 MMOL/L (ref 3.5–5)
TSH SERPL-ACNC: 1.18 UIU/ML (ref 0.35–4.94)

## 2024-09-10 ENCOUNTER — DOCUMENTATION ONLY (OUTPATIENT)
Dept: OTHER | Facility: CLINIC | Age: 84
End: 2024-09-10
Payer: MEDICARE

## 2024-09-12 DIAGNOSIS — I50.32 HEART FAILURE, DIASTOLIC, CHRONIC (H): Primary | ICD-10-CM

## 2024-09-17 ENCOUNTER — LAB (OUTPATIENT)
Dept: LAB | Facility: CLINIC | Age: 84
End: 2024-09-17
Payer: MEDICARE

## 2024-09-17 DIAGNOSIS — I50.9 CONGESTIVE HEART FAILURE (H): ICD-10-CM

## 2024-09-17 DIAGNOSIS — I50.32 HEART FAILURE, DIASTOLIC, CHRONIC (H): ICD-10-CM

## 2024-09-17 DIAGNOSIS — I12.9 HYPERTENSIVE KIDNEY DISEASE, STAGE III (H): ICD-10-CM

## 2024-09-17 DIAGNOSIS — E78.5 HYPERLIPIDEMIA LDL GOAL <100: Primary | ICD-10-CM

## 2024-09-17 DIAGNOSIS — N18.30 HYPERTENSIVE KIDNEY DISEASE, STAGE III (H): ICD-10-CM

## 2024-09-17 DIAGNOSIS — I25.10 CORONARY ARTERIOSCLEROSIS IN NATIVE ARTERY: ICD-10-CM

## 2024-09-17 LAB
ANION GAP SERPL CALCULATED.3IONS-SCNC: 9 MMOL/L (ref 7–15)
BUN SERPL-MCNC: 26.8 MG/DL (ref 8–23)
CALCIUM SERPL-MCNC: 9.9 MG/DL (ref 8.8–10.4)
CHLORIDE SERPL-SCNC: 105 MMOL/L (ref 98–107)
CREAT SERPL-MCNC: 1.36 MG/DL (ref 0.67–1.17)
EGFRCR SERPLBLD CKD-EPI 2021: 51 ML/MIN/1.73M2
GLUCOSE SERPL-MCNC: 114 MG/DL (ref 70–99)
HCO3 SERPL-SCNC: 31 MMOL/L (ref 22–29)
POTASSIUM SERPL-SCNC: 4.3 MMOL/L (ref 3.4–5.3)
SODIUM SERPL-SCNC: 145 MMOL/L (ref 135–145)

## 2024-09-17 PROCEDURE — 36415 COLL VENOUS BLD VENIPUNCTURE: CPT

## 2024-09-17 PROCEDURE — 80048 BASIC METABOLIC PNL TOTAL CA: CPT

## 2024-10-07 DIAGNOSIS — I50.9 CONGESTIVE HEART FAILURE (H): Primary | ICD-10-CM

## 2024-10-11 ENCOUNTER — LAB (OUTPATIENT)
Dept: LAB | Facility: CLINIC | Age: 84
End: 2024-10-11
Payer: MEDICARE

## 2024-10-11 DIAGNOSIS — I50.9 CONGESTIVE HEART FAILURE (H): ICD-10-CM

## 2024-10-11 LAB
ANION GAP SERPL CALCULATED.3IONS-SCNC: 11 MMOL/L (ref 7–15)
BUN SERPL-MCNC: 26 MG/DL (ref 8–23)
CALCIUM SERPL-MCNC: 10.4 MG/DL (ref 8.8–10.4)
CHLORIDE SERPL-SCNC: 100 MMOL/L (ref 98–107)
CREAT SERPL-MCNC: 1.46 MG/DL (ref 0.67–1.17)
EGFRCR SERPLBLD CKD-EPI 2021: 47 ML/MIN/1.73M2
GLUCOSE SERPL-MCNC: 99 MG/DL (ref 70–99)
HCO3 SERPL-SCNC: 31 MMOL/L (ref 22–29)
POTASSIUM SERPL-SCNC: 4.1 MMOL/L (ref 3.4–5.3)
SODIUM SERPL-SCNC: 142 MMOL/L (ref 135–145)

## 2024-10-11 PROCEDURE — 36415 COLL VENOUS BLD VENIPUNCTURE: CPT

## 2024-10-11 PROCEDURE — 80048 BASIC METABOLIC PNL TOTAL CA: CPT

## 2024-11-04 ENCOUNTER — APPOINTMENT (RX ONLY)
Dept: URBAN - METROPOLITAN AREA CLINIC 147 | Facility: CLINIC | Age: 84
Setting detail: DERMATOLOGY
End: 2024-11-04

## 2024-11-04 DIAGNOSIS — L57.0 ACTINIC KERATOSIS: ICD-10-CM

## 2024-11-04 DIAGNOSIS — Z85.820 PERSONAL HISTORY OF MALIGNANT MELANOMA OF SKIN: ICD-10-CM

## 2024-11-04 DIAGNOSIS — D22 MELANOCYTIC NEVI: ICD-10-CM

## 2024-11-04 DIAGNOSIS — D18.0 HEMANGIOMA: ICD-10-CM

## 2024-11-04 DIAGNOSIS — L81.4 OTHER MELANIN HYPERPIGMENTATION: ICD-10-CM

## 2024-11-04 DIAGNOSIS — L82.1 OTHER SEBORRHEIC KERATOSIS: ICD-10-CM

## 2024-11-04 PROBLEM — D48.5 NEOPLASM OF UNCERTAIN BEHAVIOR OF SKIN: Status: ACTIVE | Noted: 2024-11-04

## 2024-11-04 PROBLEM — D18.01 HEMANGIOMA OF SKIN AND SUBCUTANEOUS TISSUE: Status: ACTIVE | Noted: 2024-11-04

## 2024-11-04 PROBLEM — D22.5 MELANOCYTIC NEVI OF TRUNK: Status: ACTIVE | Noted: 2024-11-04

## 2024-11-04 PROCEDURE — 17003 DESTRUCT PREMALG LES 2-14: CPT

## 2024-11-04 PROCEDURE — ? COUNSELING

## 2024-11-04 PROCEDURE — ? FULL BODY SKIN EXAM

## 2024-11-04 PROCEDURE — ? LIQUID NITROGEN

## 2024-11-04 PROCEDURE — 11102 TANGNTL BX SKIN SINGLE LES: CPT

## 2024-11-04 PROCEDURE — ? BIOPSY BY SHAVE METHOD

## 2024-11-04 PROCEDURE — 99213 OFFICE O/P EST LOW 20 MIN: CPT | Mod: 25

## 2024-11-04 PROCEDURE — ? TREATMENT REGIMEN

## 2024-11-04 PROCEDURE — 11103 TANGNTL BX SKIN EA SEP/ADDL: CPT

## 2024-11-04 PROCEDURE — 17000 DESTRUCT PREMALG LESION: CPT | Mod: 59

## 2024-11-04 ASSESSMENT — LOCATION ZONE DERM
LOCATION ZONE: TRUNK
LOCATION ZONE: FACE
LOCATION ZONE: ARM
LOCATION ZONE: EAR
LOCATION ZONE: LEG
LOCATION ZONE: HAND

## 2024-11-04 ASSESSMENT — LOCATION SIMPLE DESCRIPTION DERM
LOCATION SIMPLE: RIGHT FOREHEAD
LOCATION SIMPLE: RIGHT SHOULDER
LOCATION SIMPLE: RIGHT HAND
LOCATION SIMPLE: UPPER BACK
LOCATION SIMPLE: RIGHT UPPER ARM
LOCATION SIMPLE: RIGHT FOREARM
LOCATION SIMPLE: RIGHT CALF
LOCATION SIMPLE: LEFT HAND
LOCATION SIMPLE: LEFT FOREARM
LOCATION SIMPLE: RIGHT UPPER BACK
LOCATION SIMPLE: LEFT TEMPLE
LOCATION SIMPLE: RIGHT EAR
LOCATION SIMPLE: RIGHT LOWER LEG
LOCATION SIMPLE: LEFT FOREHEAD
LOCATION SIMPLE: ABDOMEN

## 2024-11-04 ASSESSMENT — LOCATION DETAILED DESCRIPTION DERM
LOCATION DETAILED: RIGHT RADIAL DORSAL HAND
LOCATION DETAILED: LEFT PROXIMAL DORSAL FOREARM
LOCATION DETAILED: RIGHT PROXIMAL DORSAL FOREARM
LOCATION DETAILED: INFERIOR THORACIC SPINE
LOCATION DETAILED: LEFT FOREHEAD
LOCATION DETAILED: RIGHT MEDIAL UPPER BACK
LOCATION DETAILED: RIGHT SUPERIOR HELIX
LOCATION DETAILED: RIGHT VENTRAL PROXIMAL FOREARM
LOCATION DETAILED: RIGHT PROXIMAL POSTERIOR UPPER ARM
LOCATION DETAILED: PERIUMBILICAL SKIN
LOCATION DETAILED: RIGHT SUPERIOR FOREHEAD
LOCATION DETAILED: RIGHT ANTERIOR SHOULDER
LOCATION DETAILED: LEFT INFERIOR TEMPLE
LOCATION DETAILED: RIGHT LATERAL FOREHEAD
LOCATION DETAILED: RIGHT LATERAL DISTAL CALF
LOCATION DETAILED: RIGHT DISTAL CALF
LOCATION DETAILED: LEFT VENTRAL PROXIMAL FOREARM
LOCATION DETAILED: LEFT ULNAR DORSAL HAND
LOCATION DETAILED: RIGHT INFERIOR HELIX

## 2024-11-04 NOTE — HPI: FULL BODY SKIN EXAMINATION
What Type Of Note Output Would You Prefer (Optional)?: Standard Output
What Is The Reason For Today's Visit?: Skin Lesions
What Is The Reason For Today's Visit? (Being Monitored For X): concerning skin lesions on a periodic basis
How Severe Are Your Spot(S)?: mild

## 2024-11-04 NOTE — PROCEDURE: LIQUID NITROGEN
Detail Level: Zone
Duration Of Freeze Thaw-Cycle (Seconds): 0
Show Applicator Variable?: Yes
Render Note In Bullet Format When Appropriate: No
Consent: The patient's consent was obtained including but not limited to risks of crusting, scabbing, blistering, scarring, darker or lighter pigmentary change, recurrence, incomplete removal and infection.
Post-Care Instructions: I reviewed with the patient in detail post-care instructions. Patient is to wear sunprotection, and avoid picking at any of the treated lesions. Pt may apply Vaseline to crusted or scabbing areas.

## 2025-02-04 ENCOUNTER — APPOINTMENT (OUTPATIENT)
Dept: URBAN - METROPOLITAN AREA CLINIC 147 | Facility: CLINIC | Age: 85
Setting detail: DERMATOLOGY
End: 2025-02-04

## 2025-02-04 DIAGNOSIS — L82.0 INFLAMED SEBORRHEIC KERATOSIS: ICD-10-CM

## 2025-02-04 DIAGNOSIS — L81.4 OTHER MELANIN HYPERPIGMENTATION: ICD-10-CM

## 2025-02-04 DIAGNOSIS — D22 MELANOCYTIC NEVI: ICD-10-CM

## 2025-02-04 DIAGNOSIS — Z85.820 PERSONAL HISTORY OF MALIGNANT MELANOMA OF SKIN: ICD-10-CM

## 2025-02-04 DIAGNOSIS — D18.0 HEMANGIOMA: ICD-10-CM

## 2025-02-04 DIAGNOSIS — L82.1 OTHER SEBORRHEIC KERATOSIS: ICD-10-CM

## 2025-02-04 PROBLEM — D18.01 HEMANGIOMA OF SKIN AND SUBCUTANEOUS TISSUE: Status: ACTIVE | Noted: 2025-02-04

## 2025-02-04 PROBLEM — D22.5 MELANOCYTIC NEVI OF TRUNK: Status: ACTIVE | Noted: 2025-02-04

## 2025-02-04 PROBLEM — D48.5 NEOPLASM OF UNCERTAIN BEHAVIOR OF SKIN: Status: ACTIVE | Noted: 2025-02-04

## 2025-02-04 PROCEDURE — ? BIOPSY BY SHAVE METHOD

## 2025-02-04 PROCEDURE — ? TREATMENT REGIMEN

## 2025-02-04 PROCEDURE — 17110 DESTRUCTION B9 LES UP TO 14: CPT

## 2025-02-04 PROCEDURE — ? LIQUID NITROGEN

## 2025-02-04 PROCEDURE — 11102 TANGNTL BX SKIN SINGLE LES: CPT | Mod: 59

## 2025-02-04 PROCEDURE — ? COUNSELING

## 2025-02-04 PROCEDURE — ? FULL BODY SKIN EXAM

## 2025-02-04 PROCEDURE — 99213 OFFICE O/P EST LOW 20 MIN: CPT | Mod: 25

## 2025-02-04 ASSESSMENT — LOCATION DETAILED DESCRIPTION DERM
LOCATION DETAILED: RIGHT ANTERIOR SHOULDER
LOCATION DETAILED: LEFT PROXIMAL DORSAL FOREARM
LOCATION DETAILED: RIGHT INFERIOR LATERAL NECK
LOCATION DETAILED: INFERIOR THORACIC SPINE
LOCATION DETAILED: LEFT VENTRAL PROXIMAL FOREARM
LOCATION DETAILED: RIGHT CLAVICULAR NECK
LOCATION DETAILED: PERIUMBILICAL SKIN
LOCATION DETAILED: RIGHT VENTRAL PROXIMAL FOREARM
LOCATION DETAILED: LEFT INFERIOR TEMPLE
LOCATION DETAILED: RIGHT MEDIAL UPPER BACK

## 2025-02-04 ASSESSMENT — LOCATION ZONE DERM
LOCATION ZONE: TRUNK
LOCATION ZONE: ARM
LOCATION ZONE: NECK
LOCATION ZONE: FACE

## 2025-02-04 ASSESSMENT — LOCATION SIMPLE DESCRIPTION DERM
LOCATION SIMPLE: LEFT FOREARM
LOCATION SIMPLE: UPPER BACK
LOCATION SIMPLE: RIGHT UPPER BACK
LOCATION SIMPLE: ABDOMEN
LOCATION SIMPLE: RIGHT ANTERIOR NECK
LOCATION SIMPLE: LEFT TEMPLE
LOCATION SIMPLE: RIGHT FOREARM
LOCATION SIMPLE: RIGHT SHOULDER

## 2025-02-04 NOTE — PROCEDURE: LIQUID NITROGEN
Consent: The patient's consent was obtained including but not limited to risks of crusting, scabbing, blistering, scarring, darker or lighter pigmentary change, recurrence, incomplete removal and infection.
Add 52 Modifier (Optional): no
Medical Necessity Information: It is in your best interest to select a reason for this procedure from the list below. All of these items fulfill various CMS LCD requirements except the new and changing color options.
Medical Necessity Clause: This procedure was medically necessary because the lesions that were treated were:
Show Applicator Variable?: Yes
Spray Paint Text: The liquid nitrogen was applied to the skin utilizing a spray paint frosting technique.
Post-Care Instructions: I reviewed with the patient in detail post-care instructions. Patient is to wear sunprotection, and avoid picking at any of the treated lesions. Pt may apply Vaseline to crusted or scabbing areas.
Detail Level: Zone

## 2025-02-04 NOTE — HPI: EVALUATION OF SKIN LESION(S)
What Type Of Note Output Would You Prefer (Optional)?: Standard Output
Hpi Title: Evaluation of Skin Lesions
Additional History: Pt has areas of concern on the neck. The areas are irritated and have been present for months.

## 2025-03-30 ENCOUNTER — HEALTH MAINTENANCE LETTER (OUTPATIENT)
Age: 85
End: 2025-03-30

## 2025-04-08 ENCOUNTER — APPOINTMENT (OUTPATIENT)
Dept: URBAN - METROPOLITAN AREA CLINIC 147 | Facility: CLINIC | Age: 85
Setting detail: DERMATOLOGY
End: 2025-04-08

## 2025-04-08 DIAGNOSIS — L57.0 ACTINIC KERATOSIS: ICD-10-CM

## 2025-04-08 DIAGNOSIS — Z85.820 PERSONAL HISTORY OF MALIGNANT MELANOMA OF SKIN: ICD-10-CM

## 2025-04-08 DIAGNOSIS — D22 MELANOCYTIC NEVI: ICD-10-CM

## 2025-04-08 DIAGNOSIS — L81.4 OTHER MELANIN HYPERPIGMENTATION: ICD-10-CM

## 2025-04-08 DIAGNOSIS — L82.1 OTHER SEBORRHEIC KERATOSIS: ICD-10-CM

## 2025-04-08 DIAGNOSIS — D18.0 HEMANGIOMA: ICD-10-CM

## 2025-04-08 DIAGNOSIS — L82.0 INFLAMED SEBORRHEIC KERATOSIS: ICD-10-CM

## 2025-04-08 PROBLEM — D18.01 HEMANGIOMA OF SKIN AND SUBCUTANEOUS TISSUE: Status: ACTIVE | Noted: 2025-04-08

## 2025-04-08 PROBLEM — D22.5 MELANOCYTIC NEVI OF TRUNK: Status: ACTIVE | Noted: 2025-04-08

## 2025-04-08 PROBLEM — D48.5 NEOPLASM OF UNCERTAIN BEHAVIOR OF SKIN: Status: ACTIVE | Noted: 2025-04-08

## 2025-04-08 PROCEDURE — ? BIOPSY BY SHAVE METHOD

## 2025-04-08 PROCEDURE — ? FULL BODY SKIN EXAM

## 2025-04-08 PROCEDURE — ? TREATMENT REGIMEN

## 2025-04-08 PROCEDURE — 11102 TANGNTL BX SKIN SINGLE LES: CPT | Mod: 59

## 2025-04-08 PROCEDURE — ? COUNSELING

## 2025-04-08 PROCEDURE — ? LIQUID NITROGEN

## 2025-04-08 PROCEDURE — 99213 OFFICE O/P EST LOW 20 MIN: CPT | Mod: 25

## 2025-04-08 PROCEDURE — 17003 DESTRUCT PREMALG LES 2-14: CPT | Mod: 59

## 2025-04-08 PROCEDURE — 17000 DESTRUCT PREMALG LESION: CPT | Mod: 59

## 2025-04-08 PROCEDURE — 17111 DESTRUCTION B9 LESIONS 15/>: CPT

## 2025-04-08 PROCEDURE — 11103 TANGNTL BX SKIN EA SEP/ADDL: CPT

## 2025-04-08 ASSESSMENT — LOCATION SIMPLE DESCRIPTION DERM
LOCATION SIMPLE: RIGHT FOREARM
LOCATION SIMPLE: RIGHT ANTERIOR NECK
LOCATION SIMPLE: LEFT CLAVICULAR SKIN
LOCATION SIMPLE: RIGHT FOREHEAD
LOCATION SIMPLE: NOSE
LOCATION SIMPLE: LEFT CHEEK
LOCATION SIMPLE: LEFT FOREHEAD
LOCATION SIMPLE: ABDOMEN
LOCATION SIMPLE: RIGHT CHEEK
LOCATION SIMPLE: UPPER BACK
LOCATION SIMPLE: RIGHT EAR
LOCATION SIMPLE: RIGHT UPPER ARM
LOCATION SIMPLE: LEFT ANTERIOR NECK
LOCATION SIMPLE: CHEST
LOCATION SIMPLE: SUPERIOR FOREHEAD
LOCATION SIMPLE: RIGHT CLAVICULAR SKIN
LOCATION SIMPLE: RIGHT UPPER BACK
LOCATION SIMPLE: LEFT TEMPLE
LOCATION SIMPLE: LEFT FOREARM
LOCATION SIMPLE: RIGHT SHOULDER
LOCATION SIMPLE: RIGHT AXILLARY VAULT

## 2025-04-08 ASSESSMENT — LOCATION DETAILED DESCRIPTION DERM
LOCATION DETAILED: LEFT PROXIMAL DORSAL FOREARM
LOCATION DETAILED: RIGHT DISTAL POSTERIOR UPPER ARM
LOCATION DETAILED: LEFT CLAVICULAR NECK
LOCATION DETAILED: RIGHT ANTERIOR SHOULDER
LOCATION DETAILED: RIGHT FOREHEAD
LOCATION DETAILED: LEFT CLAVICULAR SKIN
LOCATION DETAILED: RIGHT AXILLARY VAULT
LOCATION DETAILED: NASAL DORSUM
LOCATION DETAILED: RIGHT VENTRAL PROXIMAL FOREARM
LOCATION DETAILED: INFERIOR THORACIC SPINE
LOCATION DETAILED: RIGHT CLAVICULAR NECK
LOCATION DETAILED: RIGHT PROXIMAL POSTERIOR UPPER ARM
LOCATION DETAILED: LEFT INFERIOR TEMPLE
LOCATION DETAILED: RIGHT INFERIOR ANTERIOR NECK
LOCATION DETAILED: LEFT VENTRAL PROXIMAL FOREARM
LOCATION DETAILED: RIGHT MEDIAL UPPER BACK
LOCATION DETAILED: RIGHT MEDIAL MALAR CHEEK
LOCATION DETAILED: LEFT MEDIAL MALAR CHEEK
LOCATION DETAILED: PERIUMBILICAL SKIN
LOCATION DETAILED: RIGHT CLAVICULAR SKIN
LOCATION DETAILED: LEFT SUPERIOR FOREHEAD
LOCATION DETAILED: SUPERIOR MID FOREHEAD
LOCATION DETAILED: LEFT INFERIOR FOREHEAD
LOCATION DETAILED: RIGHT SUPERIOR HELIX
LOCATION DETAILED: LEFT MID TEMPLE
LOCATION DETAILED: RIGHT MEDIAL SUPERIOR CHEST
LOCATION DETAILED: RIGHT POSTERIOR SHOULDER

## 2025-04-08 ASSESSMENT — LOCATION ZONE DERM
LOCATION ZONE: FACE
LOCATION ZONE: NOSE
LOCATION ZONE: EAR
LOCATION ZONE: ARM
LOCATION ZONE: TRUNK
LOCATION ZONE: AXILLAE
LOCATION ZONE: NECK

## 2025-04-08 NOTE — PROCEDURE: LIQUID NITROGEN
Render Note In Bullet Format When Appropriate: No
Show Aperture Variable?: Yes
Medical Necessity Clause: This procedure was medically necessary because the lesions that were treated were:
Spray Paint Text: The liquid nitrogen was applied to the skin utilizing a spray paint frosting technique.
Post-Care Instructions: I reviewed with the patient in detail post-care instructions. Patient is to wear sunprotection, and avoid picking at any of the treated lesions. Pt may apply Vaseline to crusted or scabbing areas.
Detail Level: Zone
Consent: The patient's consent was obtained including but not limited to risks of crusting, scabbing, blistering, scarring, darker or lighter pigmentary change, recurrence, incomplete removal and infection.
Medical Necessity Information: It is in your best interest to select a reason for this procedure from the list below. All of these items fulfill various CMS LCD requirements except the new and changing color options.
Duration Of Freeze Thaw-Cycle (Seconds): 0

## 2025-04-22 ENCOUNTER — OFFICE VISIT (OUTPATIENT)
Dept: FAMILY MEDICINE | Facility: CLINIC | Age: 85
End: 2025-04-22
Payer: MEDICARE

## 2025-04-22 ENCOUNTER — ANCILLARY PROCEDURE (OUTPATIENT)
Dept: GENERAL RADIOLOGY | Facility: CLINIC | Age: 85
End: 2025-04-22
Attending: NURSE PRACTITIONER
Payer: MEDICARE

## 2025-04-22 VITALS
RESPIRATION RATE: 16 BRPM | OXYGEN SATURATION: 96 % | HEIGHT: 77 IN | TEMPERATURE: 97.1 F | SYSTOLIC BLOOD PRESSURE: 130 MMHG | HEART RATE: 75 BPM | WEIGHT: 223 LBS | DIASTOLIC BLOOD PRESSURE: 72 MMHG | BODY MASS INDEX: 26.33 KG/M2

## 2025-04-22 DIAGNOSIS — M54.42 CHRONIC LEFT-SIDED LOW BACK PAIN WITH LEFT-SIDED SCIATICA: ICD-10-CM

## 2025-04-22 DIAGNOSIS — M25.552 HIP PAIN, LEFT: Primary | ICD-10-CM

## 2025-04-22 DIAGNOSIS — I48.92 ATRIAL FLUTTER, UNSPECIFIED TYPE (H): ICD-10-CM

## 2025-04-22 DIAGNOSIS — G89.29 CHRONIC LEFT-SIDED LOW BACK PAIN WITH LEFT-SIDED SCIATICA: ICD-10-CM

## 2025-04-22 DIAGNOSIS — N18.30 HYPERTENSIVE KIDNEY DISEASE, STAGE III (H): ICD-10-CM

## 2025-04-22 DIAGNOSIS — M25.552 HIP PAIN, LEFT: ICD-10-CM

## 2025-04-22 DIAGNOSIS — I12.9 HYPERTENSIVE KIDNEY DISEASE, STAGE III (H): ICD-10-CM

## 2025-04-22 PROCEDURE — 72100 X-RAY EXAM L-S SPINE 2/3 VWS: CPT | Mod: TC | Performed by: RADIOLOGY

## 2025-04-22 PROCEDURE — 3075F SYST BP GE 130 - 139MM HG: CPT | Performed by: NURSE PRACTITIONER

## 2025-04-22 PROCEDURE — 3078F DIAST BP <80 MM HG: CPT | Performed by: NURSE PRACTITIONER

## 2025-04-22 PROCEDURE — 99214 OFFICE O/P EST MOD 30 MIN: CPT | Performed by: NURSE PRACTITIONER

## 2025-04-22 PROCEDURE — G2211 COMPLEX E/M VISIT ADD ON: HCPCS | Performed by: NURSE PRACTITIONER

## 2025-04-22 PROCEDURE — 1126F AMNT PAIN NOTED NONE PRSNT: CPT | Performed by: NURSE PRACTITIONER

## 2025-04-22 ASSESSMENT — PAIN SCALES - GENERAL: PAINLEVEL_OUTOF10: NO PAIN (0)

## 2025-04-22 NOTE — PROGRESS NOTES
"  Assessment & Plan     Hip pain, left  History of bilateral SO. I suspect the hip pain is referred pain from the lumbar spine and arthritis. He has not had any trauma or falls to the left hip and symptoms have been constant/ stable since imaging was completed in [place]. I was able to review this read. No hip imaging today. He may benefit from an injection for pain. Referral to physical therapy and sports ortho for further evaluation.   - Physical Therapy  Referral; Future  - XR Lumbar Spine 2/3 Views; Future  - Orthopedic  Referral; Future    Chronic left-sided low back pain with left-sided sciatica  Imaging of lumbar spine completed today. Arthritis present but generally well maintained intervetebral joint spacing maintained. Physical therapy can be helpful for this. Ortho referral placed.   - XR Lumbar Spine 2/3 Views; Future  - Orthopedic  Referral; Future    Atrial flutter, unspecified type (H)  On xarelto, doing well. No symptoms. Denies any concerns.     Hypertensive kidney disease, stage III (H)  BP is well controlled on prescribed medications. BMP reviewed from feb. Stable and consistent with previous evaluations.     The longitudinal plan of care for the diagnosis(es)/condition(s) as documented were addressed during this visit. Due to the added complexity in care, I will continue to support Homer in the subsequent management and with ongoing continuity of care.    BMI  Estimated body mass index is 26.79 kg/m  as calculated from the following:    Height as of this encounter: 1.943 m (6' 4.5\").    Weight as of this encounter: 101.2 kg (223 lb).         Subjective   Homer is a 85 year old, presenting for the following health issues:  Musculoskeletal Problem        4/22/2025     9:22 AM   Additional Questions   Roomed by Helga MAYS MA     HPI        Concern - Left hip pain  Onset: Intermittently over a few years-seems to be getting worse in the last couple months  Description: Sharp " pains in left hip, then will lose strength in left leg. Will then have flare up of sciatica. Pain does seem to then radiate into spine-this is new for him in the last 6 months  Intensity: moderate  Progression of Symptoms:  worsening  Accompanying Signs & Symptoms: None  Previous history of similar problem: None  Precipitating factors:        Worsened by: When standing-his left foot does drag a little bit and the effort to move the foot is causing the sharp pain up into hip  Alleviating factors:        Improved by: See below  Therapies tried and outcome: Topical pain relief creams-most helpful was icy hot, ibuprofen once in a while    Imaging completed in December in [place], he did some physical therapy here and in [place]. No difference has been noted in symptoms.  He reports that the physical therapy in [place] was working on balance. He feels that his balance is better.      More bothersome now is the pain and dragging of the left foot/ shuffling. He will have his foot get caught then a pain will shoot up his torso and across. It will also aggravate his low back.     EXAMINATION:  XR HIP UNILAT 2-3VIEWS, W/ OR W/O PELVIS LEFT    EXAM DATE:  12/11/2024 11:30 AM    CLINICAL HISTORY:  Patient Reported Symptom: none  Diagnosis Info: Pain of left hip    ADDITIONAL CLINICAL HISTORY:  None.    COMPARISON:  No comparisons were made when reading this study.    TECHNIQUE:  3 views of the left hip/pelvis were obtained.    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 The pelvic cortical margins are intact. The left hip with orthopedic hardware is well seated. No periprosthetic fracture. There is degenerative change of the visualized lower lumbar spine.  Exam End: 12/11/24 10:30 AM           Review of Systems  Constitutional, HEENT, cardiovascular, pulmonary, gi and gu systems are negative, except as otherwise noted.      Objective    /72   Pulse 75   Temp 97.1  F (36.2  C) (Tympanic)   Resp 16   Ht  "1.943 m (6' 4.5\")   Wt 101.2 kg (223 lb)   SpO2 96%   BMI 26.79 kg/m    Body mass index is 26.79 kg/m .  Physical Exam   GENERAL: alert and no distress  RESP: lungs clear to auscultation - no rales, rhonchi or wheezes  CV: regular rate and rhythm, normal S1 S2, no S3 or S4, no murmur, click or rub, no peripheral edema  MS: no gross musculoskeletal defects noted, no edema. Walk to steady but observed slight shuffling to the left foot. Strength equal on lower extremities.           Signed Electronically by: BENY Davis CNP    "

## 2025-04-23 ENCOUNTER — PATIENT OUTREACH (OUTPATIENT)
Dept: CARE COORDINATION | Facility: CLINIC | Age: 85
End: 2025-04-23
Payer: MEDICARE

## 2025-05-08 ENCOUNTER — THERAPY VISIT (OUTPATIENT)
Dept: PHYSICAL THERAPY | Facility: CLINIC | Age: 85
End: 2025-05-08
Attending: NURSE PRACTITIONER
Payer: MEDICARE

## 2025-05-08 ENCOUNTER — TRANSFERRED RECORDS (OUTPATIENT)
Dept: HEALTH INFORMATION MANAGEMENT | Facility: CLINIC | Age: 85
End: 2025-05-08

## 2025-05-08 DIAGNOSIS — M25.552 HIP PAIN, LEFT: ICD-10-CM

## 2025-05-08 PROCEDURE — 97110 THERAPEUTIC EXERCISES: CPT | Mod: GP | Performed by: PHYSICAL THERAPIST

## 2025-05-08 PROCEDURE — 97530 THERAPEUTIC ACTIVITIES: CPT | Mod: GP | Performed by: PHYSICAL THERAPIST

## 2025-05-08 PROCEDURE — 97161 PT EVAL LOW COMPLEX 20 MIN: CPT | Mod: GP | Performed by: PHYSICAL THERAPIST

## 2025-05-08 ASSESSMENT — ACTIVITIES OF DAILY LIVING (ADL)
ADL_SCORE(%): 0
HEAVY_WORK: EXTREME DIFFICULTY
SITTING_FOR_15_MINUTES: SLIGHT DIFFICULTY
HOW_WOULD_YOU_RATE_YOUR_CURRENT_LEVEL_OF_FUNCTION_DURING_YOUR_USUAL_ACTIVITIES_OF_DAILY_LIVING_FROM_0_TO_100_WITH_100_BEING_YOUR_LEVEL_OF_FUNCTION_PRIOR_TO_YOUR_HIP_PROBLEM_AND_0_BEING_THE_INABILITY_TO_PERFORM_ANY_OF_YOUR_USUAL_DAILY_ACTIVITIES?: .5
ABILITY_TO_PARTICIPATE_IN_YOUR_DESIRED_SPORT_AS_LONG_AS_YOU_WOULD_LIKE: UNABLE TO DO
WALKING_INITIALLY: MODERATE DIFFICULTY
DEEP SQUATTING: EXTREME DIFFICULTY
SPORTS_TOTAL_ITEM_SCORE: 0
WALKING_INITIALLY: MODERATE DIFFICULTY
ROLLING_OVER_IN_BED: MODERATE DIFFICULTY
PUTTING_ON_SOCKS_AND_SHOES: MODERATE DIFFICULTY
GOING_UP_1_FLIGHT_OF_STAIRS: SLIGHT DIFFICULTY
GOING DOWN 1 FLIGHT OF STAIRS: MODERATE DIFFICULTY
WALKING_DOWN_STEEP_HILLS: MODERATE DIFFICULTY
WALKING_APPROXIMATELY_10_MINUTES: SLIGHT DIFFICULTY
GETTING_INTO_AND_OUT_OF_AN_AVERAGE_CAR: EXTREME DIFFICULTY
SPORTS_SCORE(%): 0
GOING_DOWN_1_FLIGHT_OF_STAIRS: MODERATE DIFFICULTY
CUTTING/LATERAL_MOVEMENTS: EXTREME DIFFICULTY
GOING UP 1 FLIGHT OF STAIRS: SLIGHT DIFFICULTY
HOS_ADL_SCORE(%): 41.18
SPORTS_COUNT: 9
WALKING_UP_STEEP_HILLS: UNABLE TO DO
STANDING FOR 15 MINUTES: EXTREME DIFFICULTY
ADL_HIGHEST_POTENTIAL_SCORE: 68
LOW_IMPACT_ACTIVITIES_LIKE_FAST_WALKING: SLIGHT DIFFICULTY
HOW_WOULD_YOU_RATE_YOUR_CURRENT_LEVEL_OF_FUNCTION?: SEVERELY ABNORMAL
SITTING FOR 15 MINUTES: SLIGHT DIFFICULTY
ADL_TOTAL_ITEM_SCORE: 0
GETTING INTO AND OUT OF AN AVERAGE CAR: EXTREME DIFFICULTY
RUNNING_ONE_MILE: UNABLE TO DO
GETTING_INTO_AND_OUT_OF_A_BATHTUB: MODERATE DIFFICULTY
RECREATIONAL ACTIVITIES: UNABLE TO DO
SPORTS_HIGHEST_POTENTIAL_SCORE: 36
STEPPING_UP_AND_DOWN_CURBS: SLIGHT DIFFICULTY
WALKING_15_MINUTES_OR_GREATER: SLIGHT DIFFICULTY
STANDING_FOR_15_MINUTES: EXTREME DIFFICULTY
RECREATIONAL_ACTIVITIES: UNABLE TO DO
PLEASE_INDICATE_YOR_PRIMARY_REASON_FOR_REFERRAL_TO_THERAPY:: HIP
GETTING_INTO_AND_OUT_OF_A_BATHTUB: MODERATE DIFFICULTY
PUTTING ON SOCKS AND SHOES: MODERATE DIFFICULTY
WALKING_FOR_APPROXIMATELY_10_MINUTES: SLIGHT DIFFICULTY
ADL_COUNT: 17
DEEP_SQUATTING: EXTREME DIFFICULTY
LIGHT_TO_MODERATE_WORK: MODERATE DIFFICULTY
JUMPING: UNABLE TO DO
HOS_ADL_HIGHEST_POTENTIAL_SCORE: 68
WALKING_15_MINUTES_OR_GREATER: SLIGHT DIFFICULTY
WALKING_DOWN_STEEP_HILLS: MODERATE DIFFICULTY
STEPPING UP AND DOWN CURBS: SLIGHT DIFFICULTY
HOW_WOULD_YOU_RATE_YOUR_CURRENT_LEVEL_OF_FUNCTION_DURING_YOUR_USUAL_ACTIVITIES_OF_DAILY_LIVING_FROM_0_TO_100_WITH_100_BEING_YOUR_LEVEL_OF_FUNCTION_PRIOR_TO_YOUR_HIP_PROBLEM_AND_0_BEING_THE_INABILITY_TO_PERFORM_ANY_OF_YOUR_USUAL_DAILY_ACTIVITIES?: .5
HEAVY_WORK: EXTREME DIFFICULTY
ABILITY_TO_PERFORM_ACTIVITY_WITH_YOUR_NORMAL_TECHNIQUE: MODERATE DIFFICULTY
LIGHT_TO_MODERATE_WORK: MODERATE DIFFICULTY
HOS_ADL_ITEM_SCORE_TOTAL: 28
TWISTING/PIVOTING ON INVOLVED LEG: UNABLE TO DO
WALKING_UP_STEEP_HILLS: UNABLE TO DO
ROLLING OVER IN BED: MODERATE DIFFICULTY
STARTING_AND_STOPPING_QUICKLY: EXTREME DIFFICULTY
TWISTING/PIVOTING_ON_INVOLVED_LEG: UNABLE TO DO

## 2025-05-08 NOTE — PROGRESS NOTES
PHYSICAL THERAPY EVALUATION  Type of Visit: Evaluation       Fall Risk Screen:  Have you fallen 2 or more times in the past year?: No  Have you fallen and had an injury in the past year?: No  Is patient receiving Physical Therapy Services?: No    Subjective   Has been having left SI joint pain for several years but has gradually gotten worse. Pain is intolerable.  Has bilateral         Presenting condition or subjective complaint: to get a new hip  Date of onset: 04/22/25 (date of order, chronic condition)    Relevant medical history: Hearing problems   Dates & types of surgery: been 10 years    Prior diagnostic imaging/testing results: X-ray     Prior therapy history for the same diagnosis, illness or injury: Yes june of last year    Prior Level of Function  Transfers:   Ambulation:   ADL:   IADL:     Living Environment  Social support: With a significant other or spouse   Type of home: [facility]; 1 level   Stairs to enter the home: Yes 13 Is there a railing: Yes     Ramp: No   Stairs inside the home: Yes 13 Is there a railing: Yes     Help at home: Home management tasks (cooking, cleaning); Medication and/or finances  Equipment owned: Straight Cane     Employment: No    Hobbies/Interests:      Patient goals for therapy: iwalk with out pain    Pain assessment:      Objective   HIP EVALUATION  PAIN: Pain Level at Rest: 7/10  Pain Level with Use: 8/10  Pain Location: left SI joint and into the spine area  Pain Quality: Sharp and Stabbing  Pain Frequency: intermittent  Pain is Worst: daytime  Pain is Exacerbated By: getting up from chair, walking first few steps, staggering steps,   Pain is Relieved By: icey hot, lidocaine    INTEGUMENTARY (edema, incisions):   POSTURE: Sitting Posture: Rounded shoulders, Forward head, Lordosis decreased, Thoracic kyphosis increased  GAIT:   Weightbearing Status: WBAT  Assistive Device(s): uses a cane when pain gets really bad  Gait Deviations: drags left foot.   ROM:   (Degrees) Left  AROM Left PROM  Right AROM Right PROM   Hip Flexion 120 (painful in SI area)   120    Hip Extension       Hip Abduction 20  20    Hip Adduction 20  20    Hip Internal Rotation       Hip External Rotation       Knee Flexion wnl  wnl    Knee Extension wnl  wnl    Lumbar Side glide     Lumbar Flexion    Lumbar Extension    PELVIC/SI SCREEN:   STRENGTH:   Pain: - none + mild ++ moderate +++ severe  Strength Scale: 0-5/5 Left Right   Hip Flexion 3+ 3+   Hip Extension 3 3   Hip Abduction 3 3   Hip Adduction 3+ 3+   Hip Internal Rotation     Hip External Rotation     Knee Flexion 4- 4-   Knee Extension 3+ 3+     Assessment & Plan   CLINICAL IMPRESSIONS  Medical Diagnosis: hip pain, left    Treatment Diagnosis: hip pain, left   Impression/Assessment: Patient is a 85 year old male with left SI joint pain complaints.  The following significant findings have been identified: Pain, Decreased ROM/flexibility, Decreased joint mobility, Decreased strength, Impaired balance, Impaired gait, Impaired muscle performance, and Decreased activity tolerance. These impairments interfere with their ability to perform self care tasks, recreational activities, household chores, household mobility, and community mobility as compared to previous level of function.     Clinical Decision Making (Complexity):  Clinical Presentation: Stable/Uncomplicated  Clinical Presentation Rationale: based on medical and personal factors listed in PT evaluation  Clinical Decision Making (Complexity): Low complexity    PLAN OF CARE  Treatment Interventions:  Modalities: Cupping, Dry Needling  Interventions: Gait Training, Manual Therapy, Neuromuscular Re-education, Therapeutic Activity, Therapeutic Exercise, Self-Care/Home Management    Long Term Goals     PT Goal 1  Goal Identifier: 1  Goal Description: Patient will be able to manage pain using an SI belt at home for short term relief  Target Date: 06/05/25  PT Goal 2  Goal Identifier: 2  Goal Description:  Patient will be able to complete sit <> stand with less than 2/10 pain in left SI joint.  Rationale: to maximize safety and independence with performance of ADLs and functional tasks  Target Date: 06/19/25  PT Goal 3  Goal Identifier: 3  Goal Description: Patient will be able to walk for 20+ minutes with less than 2/10 L SI joint pain  Rationale: to maximize safety and independence within the community  Target Date: 07/31/25  PT Goal 4  Goal Identifier: 4  Goal Description: Patient will improve L hip abd strength to 4/5 in order to improve stability for walking and turning  Target Date: 07/31/25      Frequency of Treatment: 1x/week  Duration of Treatment: 12 weeks    Recommended Referrals to Other Professionals:   Education Assessment:   Learner/Method: Patient  Education Comments: educated on role of PT, POC and HEP    Risks and benefits of evaluation/treatment have been explained.   Patient/Family/caregiver agrees with Plan of Care.     Evaluation Time:     PT Eval, Low Complexity Minutes (39838): 10       Signing Clinician: Renata Douglas PT        [facility]                                                                                   OUTPATIENT PHYSICAL THERAPY      PLAN OF TREATMENT FOR OUTPATIENT REHABILITATION   Patient's Last Name, First Name, SINGHAdithyaCLAYAdithya  Jameel Barrett YOB: 1940   Provider's Name   [facility]   Medical Record No.  [id number]     Onset Date: 04/22/25 (date of order, chronic condition)  Start of Care Date: 05/08/25     Medical Diagnosis:  hip pain, left      PT Treatment Diagnosis:  hip pain, left Plan of Treatment  Frequency/Duration: 1x/week/ 12 weeks    Certification date from 05/08/25 to 07/31/25         See note for plan of treatment details and functional goals     Renata Douglas PT                         I CERTIFY THE NEED FOR THESE SERVICES FURNISHED UNDER        THIS PLAN OF TREATMENT AND WHILE UNDER MY  CARE     (Physician attestation of this document indicates review and certification of the therapy plan).              Referring Provider:  Sophia Mendez    Initial Assessment  See Epic Evaluation- Start of Care Date: 05/08/25

## 2025-05-13 ENCOUNTER — HOSPITAL ENCOUNTER (OUTPATIENT)
Dept: CT IMAGING | Facility: CLINIC | Age: 85
Discharge: HOME | End: 2025-05-13
Attending: ORTHOPAEDIC SURGERY
Payer: MEDICARE

## 2025-05-13 DIAGNOSIS — M25.552 LEFT HIP PAIN: ICD-10-CM

## 2025-05-13 PROCEDURE — 73700 CT LOWER EXTREMITY W/O DYE: CPT | Mod: LT

## 2025-05-17 ENCOUNTER — HOSPITAL ENCOUNTER (OUTPATIENT)
Dept: MRI IMAGING | Facility: CLINIC | Age: 85
Discharge: HOME | End: 2025-05-17
Attending: ORTHOPAEDIC SURGERY | Admitting: ORTHOPAEDIC SURGERY
Payer: MEDICARE

## 2025-05-17 DIAGNOSIS — M54.50 ACUTE LOW BACK PAIN: ICD-10-CM

## 2025-05-17 DIAGNOSIS — M54.16 LUMBAR RADICULAR PAIN: ICD-10-CM

## 2025-05-17 DIAGNOSIS — M25.552 LEFT HIP PAIN: ICD-10-CM

## 2025-05-17 PROCEDURE — 72148 MRI LUMBAR SPINE W/O DYE: CPT

## 2025-06-10 ENCOUNTER — THERAPY VISIT (OUTPATIENT)
Dept: PHYSICAL THERAPY | Facility: CLINIC | Age: 85
End: 2025-06-10
Attending: NURSE PRACTITIONER
Payer: MEDICARE

## 2025-06-10 DIAGNOSIS — M25.552 HIP PAIN, LEFT: Primary | ICD-10-CM

## 2025-06-10 PROCEDURE — 97110 THERAPEUTIC EXERCISES: CPT | Mod: GP | Performed by: PHYSICAL THERAPIST

## 2025-06-17 ENCOUNTER — TELEPHONE (OUTPATIENT)
Dept: FAMILY MEDICINE | Facility: CLINIC | Age: 85
End: 2025-06-17
Payer: MEDICARE

## 2025-06-17 NOTE — TELEPHONE ENCOUNTER
Shortest course if able. Please make sure he understands any diarrhea he may experience could be an infectious diarrhea and should be seen in clinic for follow up regarding this.   BENY Davis CNP

## 2025-06-17 NOTE — TELEPHONE ENCOUNTER
Sophia Mendez  Received call from Dr Cagle from [facility]. Pt has a dental infection and she is going to put him on an antibiotic. Pt already takes cephalexin 500mg bid and pt was also given 2g of amoxicillin today before his appointment.   She is considering giving him Clindamycin 300mg tid but wanted to run this by you and see if you agree.     She can be reached at [phone]      Jorge MATHIS RN

## 2025-06-17 NOTE — TELEPHONE ENCOUNTER
Dr Cagle notified and will be putting Jameel on Clindamycin 300 mg tid x 1 week and she will relay Sophia's message about the diarrhea.    Louise Garcia RN

## 2025-06-22 ENCOUNTER — HEALTH MAINTENANCE LETTER (OUTPATIENT)
Age: 85
End: 2025-06-22

## 2025-06-26 ENCOUNTER — THERAPY VISIT (OUTPATIENT)
Dept: PHYSICAL THERAPY | Facility: CLINIC | Age: 85
End: 2025-06-26
Attending: NURSE PRACTITIONER
Payer: MEDICARE

## 2025-06-26 DIAGNOSIS — M25.552 HIP PAIN, LEFT: Primary | ICD-10-CM

## 2025-06-26 PROCEDURE — 97112 NEUROMUSCULAR REEDUCATION: CPT | Mod: GP | Performed by: PHYSICAL THERAPIST

## 2025-06-26 PROCEDURE — 97110 THERAPEUTIC EXERCISES: CPT | Mod: GP | Performed by: PHYSICAL THERAPIST

## 2025-06-26 ASSESSMENT — ACTIVITIES OF DAILY LIVING (ADL)
HOW_WOULD_YOU_RATE_YOUR_CURRENT_LEVEL_OF_FUNCTION_DURING_YOUR_USUAL_ACTIVITIES_OF_DAILY_LIVING_FROM_0_TO_100_WITH_100_BEING_YOUR_LEVEL_OF_FUNCTION_PRIOR_TO_YOUR_HIP_PROBLEM_AND_0_BEING_THE_INABILITY_TO_PERFORM_ANY_OF_YOUR_USUAL_DAILY_ACTIVITIES?: 60
RECREATIONAL_ACTIVITIES: SLIGHT DIFFICULTY
WALKING_INITIALLY: SLIGHT DIFFICULTY
HOS_ADL_SCORE(%): 83.82
WALKING_15_MINUTES_OR_GREATER: NO DIFFICULTY AT ALL
GETTING_INTO_AND_OUT_OF_AN_AVERAGE_CAR: SLIGHT DIFFICULTY
WALKING_UP_STEEP_HILLS: MODERATE DIFFICULTY
TWISTING/PIVOTING_ON_INVOLVED_LEG: MODERATE DIFFICULTY
LIGHT_TO_MODERATE_WORK: NO DIFFICULTY AT ALL
HOS_ADL_COUNT: 17
HEAVY_WORK: NO DIFFICULTY AT ALL
GETTING_INTO_AND_OUT_OF_A_BATHTUB: NO DIFFICULTY AT ALL
PUTTING_ON_SOCKS_AND_SHOES: NO DIFFICULTY AT ALL
ROLLING_OVER_IN_BED: SLIGHT DIFFICULTY
WALKING_DOWN_STEEP_HILLS: NO DIFFICULTY AT ALL
STEPPING_UP_AND_DOWN_CURBS: SLIGHT DIFFICULTY
HOS_ADL_ITEM_SCORE_TOTAL: 57
SITTING_FOR_15_MINUTES: NO DIFFICULTY AT ALL
WALKING_APPROXIMATELY_10_MINUTES: NO DIFFICULTY AT ALL
GOING_DOWN_1_FLIGHT_OF_STAIRS: NO DIFFICULTY AT ALL
HOS_ADL_HIGHEST_POTENTIAL_SCORE: 68
DEEP_SQUATTING: MODERATE DIFFICULTY
STANDING_FOR_15_MINUTES: NO DIFFICULTY AT ALL
GOING_UP_1_FLIGHT_OF_STAIRS: NO DIFFICULTY AT ALL

## 2025-06-30 ENCOUNTER — TELEPHONE (OUTPATIENT)
Dept: FAMILY MEDICINE | Facility: CLINIC | Age: 85
End: 2025-06-30
Payer: MEDICARE

## 2025-06-30 NOTE — TELEPHONE ENCOUNTER
Order/Referral Request    Who is requesting: Pt    Orders being requested: Overnight oximetry    Reason service is needed/diagnosis: Requested by [place] before appt    When are orders needed by: ASAP pt would like call when that order has been placed    Has this been discussed with Provider: No    Does patient have a preference on a Group/Provider/Facility?      Does patient have an appointment scheduled?: No    Where to send orders: Place orders within Epic    Could we send this information to you in [facility] or would you prefer to receive a phone call?:   Patient would prefer a phone call   Okay to leave a detailed message?: Yes at Home number on file [phone] (home)

## 2025-06-30 NOTE — TELEPHONE ENCOUNTER
I believe for this to be covered by medicare he will need to have an appointment for the order. [facility] could order this for him as well to be completed prior to his appointment. Otherwise he should be seen in person or video visit for the order.   BENY Davis CNP

## 2025-07-01 ENCOUNTER — OFFICE VISIT (OUTPATIENT)
Dept: FAMILY MEDICINE | Facility: CLINIC | Age: 85
End: 2025-07-01
Payer: MEDICARE

## 2025-07-01 VITALS
TEMPERATURE: 97.1 F | DIASTOLIC BLOOD PRESSURE: 66 MMHG | HEART RATE: 65 BPM | SYSTOLIC BLOOD PRESSURE: 138 MMHG | OXYGEN SATURATION: 97 % | RESPIRATION RATE: 18 BRPM

## 2025-07-01 DIAGNOSIS — I50.9 CHRONIC CONGESTIVE HEART FAILURE, UNSPECIFIED HEART FAILURE TYPE (H): ICD-10-CM

## 2025-07-01 DIAGNOSIS — G47.50 PARASOMNIA, UNSPECIFIED TYPE: ICD-10-CM

## 2025-07-01 DIAGNOSIS — I12.9 HYPERTENSIVE KIDNEY DISEASE, STAGE III (H): Primary | ICD-10-CM

## 2025-07-01 DIAGNOSIS — R53.83 FATIGUE, UNSPECIFIED TYPE: ICD-10-CM

## 2025-07-01 DIAGNOSIS — N18.30 HYPERTENSIVE KIDNEY DISEASE, STAGE III (H): Primary | ICD-10-CM

## 2025-07-01 DIAGNOSIS — I25.10 CORONARY ARTERIOSCLEROSIS IN NATIVE ARTERY: ICD-10-CM

## 2025-07-01 DIAGNOSIS — I49.3 PVC'S (PREMATURE VENTRICULAR CONTRACTIONS): ICD-10-CM

## 2025-07-01 LAB
ALT SERPL W P-5'-P-CCNC: 36 U/L (ref 0–70)
ANION GAP SERPL CALCULATED.3IONS-SCNC: 10 MMOL/L (ref 7–15)
BUN SERPL-MCNC: 23.9 MG/DL (ref 8–23)
CALCIUM SERPL-MCNC: 9.9 MG/DL (ref 8.8–10.4)
CHLORIDE SERPL-SCNC: 103 MMOL/L (ref 98–107)
CHOLEST SERPL-MCNC: 114 MG/DL
CREAT SERPL-MCNC: 1.3 MG/DL (ref 0.67–1.17)
CREAT UR-MCNC: 39.7 MG/DL
EGFRCR SERPLBLD CKD-EPI 2021: 53 ML/MIN/1.73M2
ERYTHROCYTE [DISTWIDTH] IN BLOOD BY AUTOMATED COUNT: 13.9 % (ref 10–15)
FASTING STATUS PATIENT QL REPORTED: NO
FASTING STATUS PATIENT QL REPORTED: NO
GLUCOSE SERPL-MCNC: 88 MG/DL (ref 70–99)
HCO3 SERPL-SCNC: 28 MMOL/L (ref 22–29)
HCT VFR BLD AUTO: 37.9 % (ref 40–53)
HDLC SERPL-MCNC: 52 MG/DL
HGB BLD-MCNC: 12.1 G/DL (ref 13.3–17.7)
LDLC SERPL CALC-MCNC: 49 MG/DL
MCH RBC QN AUTO: 30 PG (ref 26.5–33)
MCHC RBC AUTO-ENTMCNC: 31.9 G/DL (ref 31.5–36.5)
MCV RBC AUTO: 94 FL (ref 78–100)
MICROALBUMIN UR-MCNC: <12 MG/L
MICROALBUMIN/CREAT UR: NORMAL MG/G{CREAT}
NONHDLC SERPL-MCNC: 62 MG/DL
PLATELET # BLD AUTO: 202 10E3/UL (ref 150–450)
POTASSIUM SERPL-SCNC: 4.1 MMOL/L (ref 3.4–5.3)
RBC # BLD AUTO: 4.04 10E6/UL (ref 4.4–5.9)
SODIUM SERPL-SCNC: 141 MMOL/L (ref 135–145)
TRIGL SERPL-MCNC: 66 MG/DL
WBC # BLD AUTO: 6 10E3/UL (ref 4–11)

## 2025-07-01 PROCEDURE — 82570 ASSAY OF URINE CREATININE: CPT | Performed by: NURSE PRACTITIONER

## 2025-07-01 PROCEDURE — 3075F SYST BP GE 130 - 139MM HG: CPT | Performed by: NURSE PRACTITIONER

## 2025-07-01 PROCEDURE — 36415 COLL VENOUS BLD VENIPUNCTURE: CPT | Performed by: NURSE PRACTITIONER

## 2025-07-01 PROCEDURE — 99214 OFFICE O/P EST MOD 30 MIN: CPT | Performed by: NURSE PRACTITIONER

## 2025-07-01 PROCEDURE — 1126F AMNT PAIN NOTED NONE PRSNT: CPT | Performed by: NURSE PRACTITIONER

## 2025-07-01 PROCEDURE — 80061 LIPID PANEL: CPT | Performed by: NURSE PRACTITIONER

## 2025-07-01 PROCEDURE — 80048 BASIC METABOLIC PNL TOTAL CA: CPT | Performed by: NURSE PRACTITIONER

## 2025-07-01 PROCEDURE — 3078F DIAST BP <80 MM HG: CPT | Performed by: NURSE PRACTITIONER

## 2025-07-01 PROCEDURE — 85027 COMPLETE CBC AUTOMATED: CPT | Performed by: NURSE PRACTITIONER

## 2025-07-01 PROCEDURE — 82043 UR ALBUMIN QUANTITATIVE: CPT | Performed by: NURSE PRACTITIONER

## 2025-07-01 PROCEDURE — 84460 ALANINE AMINO (ALT) (SGPT): CPT | Performed by: NURSE PRACTITIONER

## 2025-07-01 ASSESSMENT — PAIN SCALES - GENERAL: PAINLEVEL_OUTOF10: NO PAIN (0)

## 2025-07-01 NOTE — PROGRESS NOTES
"  Assessment & Plan       ICD-10-CM    1. Hypertensive kidney disease, stage III (H)  I12.9 BASIC METABOLIC PANEL    N18.30 Albumin Random Urine Quantitative with Creat Ratio     BASIC METABOLIC PANEL     Albumin Random Urine Quantitative with Creat Ratio      2. Coronary arteriosclerosis in native artery  I25.10 Lipid panel reflex to direct LDL Non-fasting     ALT     Lipid panel reflex to direct LDL Non-fasting     ALT      3. Chronic congestive heart failure, unspecified heart failure type (H)  I50.9 CBC with Platelets     CBC with Platelets      4. PVC's (premature ventricular contractions)  I49.3 Adult E-Consult to Sleep Medicine (Outpt Provider to Specialist Written Question & Response)      5. Fatigue, unspecified type  R53.83 Adult E-Consult to Sleep Medicine (Outpt Provider to Specialist Written Question & Response)      6. Parasomnia, unspecified type  G47.50 Adult E-Consult to Sleep Medicine (Outpt Provider to Specialist Written Question & Response)          Recommend sleep apnea evaluation as recommended by cardiologist. E consult placed today. Labs updated. Reviewed recent labs done by [facility] as well.     Offered medicare wellness exam, declined by patient today.       BMI  Estimated body mass index is 26.79 kg/m  as calculated from the following:    Height as of 4/22/25: 1.943 m (6' 4.5\").    Weight as of 4/22/25: 101.2 kg (223 lb).       Jose Coronel is a 85 year old, presenting for the following health issues:  Orders        7/1/2025     2:12 PM   Additional Questions   Roomed by Helga MAYS MA     HPI      Recently was seeing his cardiologist at [facility]. Holter monitor showed high burden of PVCs at 21%. They had recommended sleep apnea evaluation to rule this out as a concern for hypoxia leading to the increased PVC burden.     He is not sure if he snores, but does have fatigue during the day.   Possible Sleep Apnea: 0956}      7/1/2025     2:33 PM   STOP-Bang Total Score   Total Score 4 "   Risk Stratification 3 - 4: Moderate Risk for GERRI       Needs an order for sleep apnea testing by request of [facility] cardiology.     Declines medicare wellness exam    Review of Systems  Constitutional, HEENT, cardiovascular, pulmonary, gi and gu systems are negative, except as otherwise noted.      Objective    /66   Pulse 65   Temp 97.1  F (36.2  C) (Tympanic)   Resp 18   SpO2 97%   There is no height or weight on file to calculate BMI.  Physical Exam   GENERAL: alert and no distress  NECK: no adenopathy, no asymmetry, masses, or scars  RESP: lungs clear to auscultation - no rales, rhonchi or wheezes  CV: regular rate and rhythm, normal S1 S2, no S3 or S4, no murmur, click or rub, no peripheral edema  MS: no gross musculoskeletal defects noted, no edema  PSYCH: mentation appears normal, affect normal/bright          Recent Results (from the past 24 hours)   CBC with Platelets   Result Value Ref Range    WBC Count 6.0 4.0 - 11.0 10e3/uL    RBC Count 4.04 (L) 4.40 - 5.90 10e6/uL    Hemoglobin 12.1 (L) 13.3 - 17.7 g/dL    Hematocrit 37.9 (L) 40.0 - 53.0 %    MCV 94 78 - 100 fL    MCH 30.0 26.5 - 33.0 pg    MCHC 31.9 31.5 - 36.5 g/dL    RDW 13.9 10.0 - 15.0 %    Platelet Count 202 150 - 450 10e3/uL         Signed Electronically by: BENY Davis CNP

## 2025-07-02 ENCOUNTER — RESULTS FOLLOW-UP (OUTPATIENT)
Dept: FAMILY MEDICINE | Facility: CLINIC | Age: 85
End: 2025-07-02

## 2025-07-03 ENCOUNTER — E-CONSULT (OUTPATIENT)
Dept: SLEEP MEDICINE | Facility: CLINIC | Age: 85
End: 2025-07-03
Payer: MEDICARE

## 2025-07-03 DIAGNOSIS — N18.31 STAGE 3A CHRONIC KIDNEY DISEASE (H): Primary | ICD-10-CM

## 2025-07-03 DIAGNOSIS — G47.33 OBSTRUCTIVE SLEEP APNEA: Primary | ICD-10-CM

## 2025-07-03 NOTE — PROGRESS NOTES
7/3/2025     E-Consult has been accepted.    Interprofessional consultation requested by:  Sophia Mendez APRN CNP      Clinical Question/Purpose: MY CLINICAL QUESTION IS: evaluate for sleep apnea. Cardiologist recommended evaluation with recent holter monitor showing 21% PVC burden. Concern for overnight hypoxia being a cause of the increased PVCs.     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 Cardiology concerned that previously document 6-7 minutes of nocturnal hypoxia on MELODY could be related to untreated GERRI and a contributing factor to nocturnal arrhythmia.     Recommendations:   Recommend Diagnostic In Lab Sleep Study  to assess for sleep disordered breathing due to high pretest probability for GERRI.  PSG recommend over HST due to significant cardiopulmonary disease   Patient should follow-up PSG results with 1 hour consultation visit with a sleep clinician to discuss results, history and any management.  Orders Placed This Encounter   Procedures    Comprehensive Sleep Study         The recommendations provided in this E-Consult are based on a review of clinical data pertinent to the clinical question presented, without a review of the patient's complete medical record or, the benefit of a comprehensive in-person or virtual patient evaluation. This consultation should not replace the clinical judgement and evaluation of the provider ordering this E-Consult. Any new clinical issues, or changes in patient status since the filing of this E-Consult will need to be taken into account when assessing these recommendations. Please contact me if you have further questions.    My total time spent reviewing clinical information and formulating assessment was 20 minutes.      Guicho  MD Joanne on 7/3/2025 at 1:18 PM  [facility]   [address], [address]   [address] [place] [address]   Appointments: [phone]

## 2025-07-03 NOTE — Clinical Note
For this E-Consult, please schedule PSG and 60 minute consult appointment to follow up results and to discuss further management with me.  Thank you, Dr. Rubio

## 2025-07-05 ENCOUNTER — PATIENT OUTREACH (OUTPATIENT)
Dept: CARE COORDINATION | Facility: CLINIC | Age: 85
End: 2025-07-05
Payer: MEDICARE

## 2025-07-07 ENCOUNTER — PATIENT OUTREACH (OUTPATIENT)
Dept: CARE COORDINATION | Facility: CLINIC | Age: 85
End: 2025-07-07
Payer: MEDICARE

## 2025-07-07 ENCOUNTER — TELEPHONE (OUTPATIENT)
Dept: SLEEP MEDICINE | Facility: CLINIC | Age: 85
End: 2025-07-07
Payer: MEDICARE

## 2025-07-07 NOTE — TELEPHONE ENCOUNTER
Reason for Call:  Appointment Request    Patient requesting this type of appt:  Sleep study    Requested provider: n/a    Reason patient unable to be scheduled: Not within requested timeframe    When does patient want to be seen/preferred time: July or August     Comments: Patient need a sleep study done in July or August for [facility] cardiology     Could we send this information to you in [facility] or would you prefer to receive a phone call?:   Patient would prefer a phone call   Okay to leave a detailed message?: Yes at Home number on file [phone] (home)    Call taken on 7/7/2025 at 11:09 AM by Yael iSmpson

## 2025-07-11 ENCOUNTER — THERAPY VISIT (OUTPATIENT)
Dept: SLEEP MEDICINE | Facility: CLINIC | Age: 85
End: 2025-07-11
Payer: MEDICARE

## 2025-07-11 DIAGNOSIS — G47.33 OBSTRUCTIVE SLEEP APNEA: ICD-10-CM

## 2025-07-17 PROBLEM — M25.552 HIP PAIN, LEFT: Status: RESOLVED | Noted: 2025-05-08 | Resolved: 2025-07-17

## 2025-07-28 ENCOUNTER — TELEPHONE (OUTPATIENT)
Dept: FAMILY MEDICINE | Facility: CLINIC | Age: 85
End: 2025-07-28
Payer: MEDICARE

## 2025-07-28 NOTE — TELEPHONE ENCOUNTER
"Patient reports that he took a nap today and he felt like he could not breathe when he woke up. He reports this is the first time this has ever happened.  He had sleep study 7/11/25. He was not set up with CPAP at that time. Also on phone with spouse (C2C on file).    Sleep study note: \" Recommendations:  Recommend repeat polysomnography with full night titration of positive airway pressure therapy for the control of sleep disordered breathing.  Based on the presence of severe obstructive sleep apnea and excessive daytime sleepiness, treatment could be empirically initiated with Auto?titrating PAP therapy with a range of 05 to 15 cmH2O. Recommend clinical follow up with sleep management team.  If devices are not acceptable or effective, patient may benefit from evaluation of possible surgical options. If he is interested, would recommend referral to specialized ENT-Sleep provider.  Advice regarding the risks of drowsy driving.  Suggest optimizing sleep schedule and avoiding sleep deprivation.  Weight management (if BMI > 30).  Pharmacologic therapy should be used for management of restless legs syndrome only if present and clinically indicated and not based on the presence of periodic limb movements alone.\"    They are asking if he could have swallowed his tongue, or had a stroke. Patient does not have any facial droop, is speaking clearly, no new numbness, weakness or balance issues. Advised that the sleep medicine team has a note to review his case, but they are requesting a CPAP to be ordered. Advised this would come through sleep medicine, but they were told to also contact PCP. They are worried he will not wake up if he goes to sleep again.    Please advise.  Karina Larios RN on 7/28/2025 at 3:41 PM      "

## 2025-07-28 NOTE — TELEPHONE ENCOUNTER
Called patient back and gave note below.  They verbalize understanding.     Karina Larios RN on 7/28/2025 at 5:38 PM

## 2025-07-28 NOTE — TELEPHONE ENCOUNTER
Please let patient know that I cannot order a CPAP as the setting are patient specific and this is out of my scope of practice. Sleeping with his head elevated or on his side can help reduce some of the apnea he may be having. If other medical concerns are present it is reasonable to follow up in clinic to assure other systems are stable like his vital signs ect. Reaching out to sleep medicine and see if they can see him sooner is an option.   BENY Davis CNP

## 2025-08-19 ENCOUNTER — DOCUMENTATION ONLY (OUTPATIENT)
Dept: SLEEP MEDICINE | Facility: CLINIC | Age: 85
End: 2025-08-19
Payer: MEDICARE

## 2025-08-19 DIAGNOSIS — G47.33 OSA (OBSTRUCTIVE SLEEP APNEA): Primary | ICD-10-CM
